# Patient Record
Sex: MALE | NOT HISPANIC OR LATINO | URBAN - METROPOLITAN AREA
[De-identification: names, ages, dates, MRNs, and addresses within clinical notes are randomized per-mention and may not be internally consistent; named-entity substitution may affect disease eponyms.]

---

## 2017-01-31 ENCOUNTER — EMERGENCY (EMERGENCY)
Facility: HOSPITAL | Age: 67
LOS: 1 days | Discharge: HOME | End: 2017-01-31
Attending: EMERGENCY MEDICINE | Admitting: EMERGENCY MEDICINE
Payer: MEDICARE

## 2017-01-31 VITALS
OXYGEN SATURATION: 95 % | SYSTOLIC BLOOD PRESSURE: 132 MMHG | HEART RATE: 99 BPM | DIASTOLIC BLOOD PRESSURE: 82 MMHG | RESPIRATION RATE: 18 BRPM | TEMPERATURE: 98 F

## 2017-01-31 DIAGNOSIS — R35.0 FREQUENCY OF MICTURITION: ICD-10-CM

## 2017-01-31 DIAGNOSIS — R05 COUGH: ICD-10-CM

## 2017-01-31 DIAGNOSIS — R50.9 FEVER, UNSPECIFIED: ICD-10-CM

## 2017-01-31 DIAGNOSIS — R06.2 WHEEZING: ICD-10-CM

## 2017-01-31 PROCEDURE — 99284 EMERGENCY DEPT VISIT MOD MDM: CPT

## 2017-01-31 RX ORDER — SODIUM CHLORIDE 9 MG/ML
1000 INJECTION INTRAMUSCULAR; INTRAVENOUS; SUBCUTANEOUS ONCE
Qty: 0 | Refills: 0 | Status: COMPLETED | OUTPATIENT
Start: 2017-01-31 | End: 2017-01-31

## 2017-01-31 RX ORDER — ALBUTEROL 90 UG/1
2.5 AEROSOL, METERED ORAL
Qty: 0 | Refills: 0 | Status: COMPLETED | OUTPATIENT
Start: 2017-01-31 | End: 2017-01-31

## 2017-01-31 NOTE — ED PROVIDER NOTE - ATTENDING CONTRIBUTION TO CARE
Private Physician Marley Mishra,(PCP), [facility]  66y pmh Prostate ca mets to bone, on alternate chemo,  pw complains of fever tmax 101.5 . pt has complains of fever off/on for two months. Had ct done yesterday neg for PE/PNA , Postivie for sml loculated left pleural effusion, sml rt effusion, enlarged hilar LN .Now comes to ed referred by pmd for complains of fever. Pt completed abx 4d ago w continued fever but with improving cough, no shortness of breath no diarreha + urinary frequency without change from baseline. PE WDWN male mild cough. NCAT chest mild vanessa wheeze no use accessory muscles, No cvat, abd soft neruo no focal defects  Marlon Knapp MD, Facep

## 2017-01-31 NOTE — ED PROVIDER NOTE - PROGRESS NOTE DETAILS
patient refuses cxr. Had outpatient CTA yesterday which was negative for PNA and PE.  Kvng PGY-3 Endorsed to Dr Geetha Knapp MD, Facep Discussed with PMD Marley Mishra. No source of fever identified. Not neutropenic. H/H at baseline as per Tad. Will plan for d/c with close PMD follow-up. PMD will follow with blood and urine cultures.   Kvng PGY-3 aGREE WITH ABOVE RES NOTE, GIVEN STRICT RETURN INSTRUCTIONS- CKT

## 2017-01-31 NOTE — ED PROVIDER NOTE - MEDICAL DECISION MAKING DETAILS
66yM h/o prostate CA with bony mets presents with intermittent fever x 2 months, no resolution with recent outpatient abx x 2. Associated cough. Negative CTA chest yesterday. Well appearing. Wheezing on lung exam. Will obtain bloodwork, rule out neutropenia, urinalysis, cxr, blood and urine cultures and reevaluate. Kvng PGY-3

## 2017-01-31 NOTE — ED PROVIDER NOTE - OBJECTIVE STATEMENT
66yM with prostate ca mets to bone, on experimental PO chemo, presents with fever, tmax 101.5. Fever intermittently x 2 months, recently completed 2nd course of antibiotics 4 days ago without resolution of fever. Also with cough. Had outpatient CTA chest yesterday, negative for PE and PNA. Denies recent neutropenia. Denies abdominal pain, SOB, diarrhea. + urinary frequency without change from baseline.   Marley Mishra,(PCP), [facility]

## 2017-02-01 VITALS
OXYGEN SATURATION: 95 % | DIASTOLIC BLOOD PRESSURE: 77 MMHG | SYSTOLIC BLOOD PRESSURE: 117 MMHG | RESPIRATION RATE: 15 BRPM | HEART RATE: 88 BPM | TEMPERATURE: 98 F

## 2017-02-01 LAB
ALBUMIN SERPL ELPH-MCNC: 4.2 G/DL — SIGNIFICANT CHANGE UP (ref 3.3–5)
ALP SERPL-CCNC: 191 U/L — HIGH (ref 40–120)
ALT FLD-CCNC: 13 U/L RC — SIGNIFICANT CHANGE UP (ref 10–45)
ANION GAP SERPL CALC-SCNC: 14 MMOL/L — SIGNIFICANT CHANGE UP (ref 5–17)
APPEARANCE UR: CLEAR — SIGNIFICANT CHANGE UP
AST SERPL-CCNC: 52 U/L — HIGH (ref 10–40)
BASOPHILS # BLD AUTO: 0 K/UL — SIGNIFICANT CHANGE UP (ref 0–0.2)
BASOPHILS NFR BLD AUTO: 0.1 % — SIGNIFICANT CHANGE UP (ref 0–2)
BILIRUB SERPL-MCNC: 0.4 MG/DL — SIGNIFICANT CHANGE UP (ref 0.2–1.2)
BILIRUB UR-MCNC: NEGATIVE — SIGNIFICANT CHANGE UP
BUN SERPL-MCNC: 10 MG/DL — SIGNIFICANT CHANGE UP (ref 7–23)
CALCIUM SERPL-MCNC: 8.8 MG/DL — SIGNIFICANT CHANGE UP (ref 8.4–10.5)
CHLORIDE SERPL-SCNC: 96 MMOL/L — SIGNIFICANT CHANGE UP (ref 96–108)
CO2 SERPL-SCNC: 24 MMOL/L — SIGNIFICANT CHANGE UP (ref 22–31)
COLOR SPEC: SIGNIFICANT CHANGE UP
CREAT SERPL-MCNC: 0.52 MG/DL — SIGNIFICANT CHANGE UP (ref 0.5–1.3)
DIFF PNL FLD: NEGATIVE — SIGNIFICANT CHANGE UP
EOSINOPHIL # BLD AUTO: 0 K/UL — SIGNIFICANT CHANGE UP (ref 0–0.5)
EOSINOPHIL NFR BLD AUTO: 0.3 % — SIGNIFICANT CHANGE UP (ref 0–6)
GAS PNL BLDV: SIGNIFICANT CHANGE UP
GLUCOSE SERPL-MCNC: 121 MG/DL — HIGH (ref 70–99)
GLUCOSE UR QL: NEGATIVE — SIGNIFICANT CHANGE UP
HCT VFR BLD CALC: 28.8 % — LOW (ref 39–50)
HGB BLD-MCNC: 9.6 G/DL — LOW (ref 13–17)
KETONES UR-MCNC: NEGATIVE — SIGNIFICANT CHANGE UP
LEUKOCYTE ESTERASE UR-ACNC: NEGATIVE — SIGNIFICANT CHANGE UP
LYMPHOCYTES # BLD AUTO: 1.8 K/UL — SIGNIFICANT CHANGE UP (ref 1–3.3)
LYMPHOCYTES # BLD AUTO: 23.7 % — SIGNIFICANT CHANGE UP (ref 13–44)
MCHC RBC-ENTMCNC: 26.9 PG — LOW (ref 27–34)
MCHC RBC-ENTMCNC: 33.2 GM/DL — SIGNIFICANT CHANGE UP (ref 32–36)
MCV RBC AUTO: 81.2 FL — SIGNIFICANT CHANGE UP (ref 80–100)
MONOCYTES # BLD AUTO: 0.9 K/UL — SIGNIFICANT CHANGE UP (ref 0–0.9)
MONOCYTES NFR BLD AUTO: 11.5 % — SIGNIFICANT CHANGE UP (ref 2–14)
NEUTROPHILS # BLD AUTO: 4.9 K/UL — SIGNIFICANT CHANGE UP (ref 1.8–7.4)
NEUTROPHILS NFR BLD AUTO: 64.5 % — SIGNIFICANT CHANGE UP (ref 43–77)
NITRITE UR-MCNC: NEGATIVE — SIGNIFICANT CHANGE UP
PH UR: 6.5 — SIGNIFICANT CHANGE UP (ref 4.8–8)
PLATELET # BLD AUTO: 302 K/UL — SIGNIFICANT CHANGE UP (ref 150–400)
POTASSIUM SERPL-MCNC: 3.8 MMOL/L — SIGNIFICANT CHANGE UP (ref 3.5–5.3)
POTASSIUM SERPL-SCNC: 3.8 MMOL/L — SIGNIFICANT CHANGE UP (ref 3.5–5.3)
PROT SERPL-MCNC: 8.1 G/DL — SIGNIFICANT CHANGE UP (ref 6–8.3)
PROT UR-MCNC: 100 MG/DL
RAPID RVP RESULT: SIGNIFICANT CHANGE UP
RBC # BLD: 3.55 M/UL — LOW (ref 4.2–5.8)
RBC # FLD: 14.9 % — HIGH (ref 10.3–14.5)
RBC CASTS # UR COMP ASSIST: SIGNIFICANT CHANGE UP /HPF (ref 0–2)
SODIUM SERPL-SCNC: 134 MMOL/L — LOW (ref 135–145)
SP GR SPEC: 1.01 — SIGNIFICANT CHANGE UP (ref 1.01–1.02)
UROBILINOGEN FLD QL: NEGATIVE — SIGNIFICANT CHANGE UP
WBC # BLD: 7.6 K/UL — SIGNIFICANT CHANGE UP (ref 3.8–10.5)
WBC # FLD AUTO: 7.6 K/UL — SIGNIFICANT CHANGE UP (ref 3.8–10.5)
WBC UR QL: SIGNIFICANT CHANGE UP /HPF (ref 0–5)

## 2017-02-01 RX ORDER — ACETAMINOPHEN 500 MG
1000 TABLET ORAL ONCE
Qty: 0 | Refills: 0 | Status: COMPLETED | OUTPATIENT
Start: 2017-02-01 | End: 2017-02-01

## 2017-02-01 RX ADMIN — ALBUTEROL 2.5 MILLIGRAM(S): 90 AEROSOL, METERED ORAL at 01:07

## 2017-02-01 RX ADMIN — Medication 400 MILLIGRAM(S): at 02:30

## 2017-02-01 RX ADMIN — ALBUTEROL 2.5 MILLIGRAM(S): 90 AEROSOL, METERED ORAL at 01:08

## 2017-02-01 RX ADMIN — SODIUM CHLORIDE 1000 MILLILITER(S): 9 INJECTION INTRAMUSCULAR; INTRAVENOUS; SUBCUTANEOUS at 00:07

## 2017-02-01 RX ADMIN — ALBUTEROL 2.5 MILLIGRAM(S): 90 AEROSOL, METERED ORAL at 00:19

## 2017-02-02 LAB
CULTURE RESULTS: NO GROWTH — SIGNIFICANT CHANGE UP
SPECIMEN SOURCE: SIGNIFICANT CHANGE UP

## 2017-02-06 LAB
CULTURE RESULTS: SIGNIFICANT CHANGE UP
CULTURE RESULTS: SIGNIFICANT CHANGE UP
SPECIMEN SOURCE: SIGNIFICANT CHANGE UP
SPECIMEN SOURCE: SIGNIFICANT CHANGE UP

## 2017-04-13 PROCEDURE — 87040 BLOOD CULTURE FOR BACTERIA: CPT

## 2017-04-13 PROCEDURE — 87798 DETECT AGENT NOS DNA AMP: CPT

## 2017-04-13 PROCEDURE — 99284 EMERGENCY DEPT VISIT MOD MDM: CPT | Mod: 25

## 2017-04-13 PROCEDURE — 84295 ASSAY OF SERUM SODIUM: CPT

## 2017-04-13 PROCEDURE — 85027 COMPLETE CBC AUTOMATED: CPT

## 2017-04-13 PROCEDURE — 80053 COMPREHEN METABOLIC PANEL: CPT

## 2017-04-13 PROCEDURE — 83605 ASSAY OF LACTIC ACID: CPT

## 2017-04-13 PROCEDURE — 85014 HEMATOCRIT: CPT

## 2017-04-13 PROCEDURE — 82330 ASSAY OF CALCIUM: CPT

## 2017-04-13 PROCEDURE — 87086 URINE CULTURE/COLONY COUNT: CPT

## 2017-04-13 PROCEDURE — 96374 THER/PROPH/DIAG INJ IV PUSH: CPT

## 2017-04-13 PROCEDURE — 82947 ASSAY GLUCOSE BLOOD QUANT: CPT

## 2017-04-13 PROCEDURE — 84132 ASSAY OF SERUM POTASSIUM: CPT

## 2017-04-13 PROCEDURE — 87633 RESP VIRUS 12-25 TARGETS: CPT

## 2017-04-13 PROCEDURE — 94640 AIRWAY INHALATION TREATMENT: CPT

## 2017-04-13 PROCEDURE — 82435 ASSAY OF BLOOD CHLORIDE: CPT

## 2017-04-13 PROCEDURE — 87581 M.PNEUMON DNA AMP PROBE: CPT

## 2017-04-13 PROCEDURE — 87486 CHLMYD PNEUM DNA AMP PROBE: CPT

## 2017-04-13 PROCEDURE — 81001 URINALYSIS AUTO W/SCOPE: CPT

## 2017-04-13 PROCEDURE — 82803 BLOOD GASES ANY COMBINATION: CPT

## 2017-05-06 ENCOUNTER — EMERGENCY (EMERGENCY)
Facility: HOSPITAL | Age: 67
LOS: 1 days | Discharge: HOME | End: 2017-05-06
Attending: EMERGENCY MEDICINE | Admitting: EMERGENCY MEDICINE
Payer: MEDICARE

## 2017-05-06 VITALS
SYSTOLIC BLOOD PRESSURE: 135 MMHG | TEMPERATURE: 98 F | RESPIRATION RATE: 16 BRPM | HEART RATE: 96 BPM | DIASTOLIC BLOOD PRESSURE: 90 MMHG | OXYGEN SATURATION: 96 %

## 2017-05-06 VITALS
RESPIRATION RATE: 18 BRPM | OXYGEN SATURATION: 96 % | TEMPERATURE: 99 F | HEART RATE: 89 BPM | SYSTOLIC BLOOD PRESSURE: 133 MMHG | DIASTOLIC BLOOD PRESSURE: 92 MMHG

## 2017-05-06 DIAGNOSIS — K92.1 MELENA: ICD-10-CM

## 2017-05-06 DIAGNOSIS — J45.909 UNSPECIFIED ASTHMA, UNCOMPLICATED: ICD-10-CM

## 2017-05-06 DIAGNOSIS — K62.5 HEMORRHAGE OF ANUS AND RECTUM: ICD-10-CM

## 2017-05-06 DIAGNOSIS — Z79.899 OTHER LONG TERM (CURRENT) DRUG THERAPY: ICD-10-CM

## 2017-05-06 DIAGNOSIS — K92.2 GASTROINTESTINAL HEMORRHAGE, UNSPECIFIED: ICD-10-CM

## 2017-05-06 LAB
ALBUMIN SERPL ELPH-MCNC: 4.4 G/DL — SIGNIFICANT CHANGE UP (ref 3.3–5)
ALP SERPL-CCNC: 76 U/L — SIGNIFICANT CHANGE UP (ref 40–120)
ALT FLD-CCNC: 29 U/L RC — SIGNIFICANT CHANGE UP (ref 10–45)
ANION GAP SERPL CALC-SCNC: 14 MMOL/L — SIGNIFICANT CHANGE UP (ref 5–17)
APTT BLD: 28.2 SEC — SIGNIFICANT CHANGE UP (ref 27.5–37.4)
AST SERPL-CCNC: 34 U/L — SIGNIFICANT CHANGE UP (ref 10–40)
BASE EXCESS BLDV CALC-SCNC: 1 MMOL/L — SIGNIFICANT CHANGE UP (ref -2–2)
BASOPHILS # BLD AUTO: 0 K/UL — SIGNIFICANT CHANGE UP (ref 0–0.2)
BASOPHILS NFR BLD AUTO: 0.1 % — SIGNIFICANT CHANGE UP (ref 0–2)
BILIRUB SERPL-MCNC: 0.3 MG/DL — SIGNIFICANT CHANGE UP (ref 0.2–1.2)
BLD GP AB SCN SERPL QL: NEGATIVE — SIGNIFICANT CHANGE UP
BUN SERPL-MCNC: 21 MG/DL — SIGNIFICANT CHANGE UP (ref 7–23)
CA-I SERPL-SCNC: 1.21 MMOL/L — SIGNIFICANT CHANGE UP (ref 1.12–1.3)
CALCIUM SERPL-MCNC: 8.9 MG/DL — SIGNIFICANT CHANGE UP (ref 8.4–10.5)
CHLORIDE BLDV-SCNC: 106 MMOL/L — SIGNIFICANT CHANGE UP (ref 96–108)
CHLORIDE SERPL-SCNC: 102 MMOL/L — SIGNIFICANT CHANGE UP (ref 96–108)
CO2 BLDV-SCNC: 28 MMOL/L — SIGNIFICANT CHANGE UP (ref 22–30)
CO2 SERPL-SCNC: 24 MMOL/L — SIGNIFICANT CHANGE UP (ref 22–31)
CREAT SERPL-MCNC: 0.61 MG/DL — SIGNIFICANT CHANGE UP (ref 0.5–1.3)
EOSINOPHIL # BLD AUTO: 0 K/UL — SIGNIFICANT CHANGE UP (ref 0–0.5)
EOSINOPHIL NFR BLD AUTO: 0.3 % — SIGNIFICANT CHANGE UP (ref 0–6)
GAS PNL BLDV: 138 MMOL/L — SIGNIFICANT CHANGE UP (ref 136–145)
GAS PNL BLDV: SIGNIFICANT CHANGE UP
GAS PNL BLDV: SIGNIFICANT CHANGE UP
GLUCOSE BLDV-MCNC: 92 MG/DL — SIGNIFICANT CHANGE UP (ref 70–99)
GLUCOSE SERPL-MCNC: 94 MG/DL — SIGNIFICANT CHANGE UP (ref 70–99)
HCO3 BLDV-SCNC: 26 MMOL/L — SIGNIFICANT CHANGE UP (ref 21–29)
HCT VFR BLD CALC: 28 % — LOW (ref 39–50)
HCT VFR BLDA CALC: 30 % — LOW (ref 39–50)
HGB BLD CALC-MCNC: 9.6 G/DL — LOW (ref 13–17)
HGB BLD-MCNC: 9.8 G/DL — LOW (ref 13–17)
INR BLD: 0.99 RATIO — SIGNIFICANT CHANGE UP (ref 0.88–1.16)
LACTATE BLDV-MCNC: 1.7 MMOL/L — SIGNIFICANT CHANGE UP (ref 0.7–2)
LYMPHOCYTES # BLD AUTO: 1.5 K/UL — SIGNIFICANT CHANGE UP (ref 1–3.3)
LYMPHOCYTES # BLD AUTO: 16.5 % — SIGNIFICANT CHANGE UP (ref 13–44)
MCHC RBC-ENTMCNC: 31.7 PG — SIGNIFICANT CHANGE UP (ref 27–34)
MCHC RBC-ENTMCNC: 34.9 GM/DL — SIGNIFICANT CHANGE UP (ref 32–36)
MCV RBC AUTO: 90.8 FL — SIGNIFICANT CHANGE UP (ref 80–100)
MONOCYTES # BLD AUTO: 0.4 K/UL — SIGNIFICANT CHANGE UP (ref 0–0.9)
MONOCYTES NFR BLD AUTO: 4.3 % — SIGNIFICANT CHANGE UP (ref 2–14)
NEUTROPHILS # BLD AUTO: 7 K/UL — SIGNIFICANT CHANGE UP (ref 1.8–7.4)
NEUTROPHILS NFR BLD AUTO: 78.8 % — HIGH (ref 43–77)
OTHER CELLS CSF MANUAL: 5 ML/DL — LOW (ref 18–22)
PCO2 BLDV: 48 MMHG — SIGNIFICANT CHANGE UP (ref 35–50)
PH BLDV: 7.36 — SIGNIFICANT CHANGE UP (ref 7.35–7.45)
PLATELET # BLD AUTO: 77 K/UL — LOW (ref 150–400)
PO2 BLDV: 27 MMHG — SIGNIFICANT CHANGE UP (ref 25–45)
POTASSIUM BLDV-SCNC: 4 MMOL/L — SIGNIFICANT CHANGE UP (ref 3.5–5)
POTASSIUM SERPL-MCNC: 4.3 MMOL/L — SIGNIFICANT CHANGE UP (ref 3.5–5.3)
POTASSIUM SERPL-SCNC: 4.3 MMOL/L — SIGNIFICANT CHANGE UP (ref 3.5–5.3)
PROT SERPL-MCNC: 7.6 G/DL — SIGNIFICANT CHANGE UP (ref 6–8.3)
PROTHROM AB SERPL-ACNC: 10.8 SEC — SIGNIFICANT CHANGE UP (ref 9.8–12.7)
RBC # BLD: 3.09 M/UL — LOW (ref 4.2–5.8)
RBC # FLD: 19.5 % — HIGH (ref 10.3–14.5)
RH IG SCN BLD-IMP: POSITIVE — SIGNIFICANT CHANGE UP
SAO2 % BLDV: 39 % — LOW (ref 67–88)
SODIUM SERPL-SCNC: 140 MMOL/L — SIGNIFICANT CHANGE UP (ref 135–145)
WBC # BLD: 8.9 K/UL — SIGNIFICANT CHANGE UP (ref 3.8–10.5)
WBC # FLD AUTO: 8.9 K/UL — SIGNIFICANT CHANGE UP (ref 3.8–10.5)

## 2017-05-06 PROCEDURE — 84295 ASSAY OF SERUM SODIUM: CPT

## 2017-05-06 PROCEDURE — 84132 ASSAY OF SERUM POTASSIUM: CPT

## 2017-05-06 PROCEDURE — 82947 ASSAY GLUCOSE BLOOD QUANT: CPT

## 2017-05-06 PROCEDURE — 99284 EMERGENCY DEPT VISIT MOD MDM: CPT | Mod: GC

## 2017-05-06 PROCEDURE — 85730 THROMBOPLASTIN TIME PARTIAL: CPT

## 2017-05-06 PROCEDURE — 85014 HEMATOCRIT: CPT

## 2017-05-06 PROCEDURE — 86850 RBC ANTIBODY SCREEN: CPT

## 2017-05-06 PROCEDURE — 99283 EMERGENCY DEPT VISIT LOW MDM: CPT | Mod: 25

## 2017-05-06 PROCEDURE — 83605 ASSAY OF LACTIC ACID: CPT

## 2017-05-06 PROCEDURE — 86901 BLOOD TYPING SEROLOGIC RH(D): CPT

## 2017-05-06 PROCEDURE — 85027 COMPLETE CBC AUTOMATED: CPT

## 2017-05-06 PROCEDURE — 86900 BLOOD TYPING SEROLOGIC ABO: CPT

## 2017-05-06 PROCEDURE — 82330 ASSAY OF CALCIUM: CPT

## 2017-05-06 PROCEDURE — 85610 PROTHROMBIN TIME: CPT

## 2017-05-06 PROCEDURE — 82435 ASSAY OF BLOOD CHLORIDE: CPT

## 2017-05-06 PROCEDURE — 82272 OCCULT BLD FECES 1-3 TESTS: CPT

## 2017-05-06 PROCEDURE — 82803 BLOOD GASES ANY COMBINATION: CPT

## 2017-05-06 PROCEDURE — 80053 COMPREHEN METABOLIC PANEL: CPT

## 2017-05-06 NOTE — ED ADULT NURSE NOTE - OBJECTIVE STATEMENT
67 y/o M presents to the ED c/o rectal bleed.  Patient has a hx of prostate CA that is stage 4 with metastasis to bone, anemia.  Patient states he was diagnosed with prostate CA in 2012.  Patient states he was treated with radiation in 2013.  The patient is currently on chemo and states he started that in February, 2017.  Patient last dose of chemo was April 19th.  Patient comes to the ED stating he has been having rectal bleeding x1 month.  Patient states the blood comes in clots and then becomes liquid.  Patient it happens almost every time he goes to the bathroom.  Patient denies any dizziness or lightheadedness.  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 Face is symmetrical. PERRL 3mmB. Speech is clear.  No chest pain, shortness of breath, diaphoresis, palpitations, left arm pain, excessive fatigue.  Wife at bedside.  Patient safety and comfort measures provided.

## 2017-05-06 NOTE — ED PROVIDER NOTE - PHYSICAL EXAMINATION
Gen: NAD  Eyes:  sclerae white, no icterus  ENT: Moist mucous membranes. No exudates  Neck: supple, no LAD, mass or goiter, trachea midline  CV: RRR. Audible S1 and S2. No murmurs, rubs, gallops, S3, nor S4  Pulm: Clear to auscultation bilaterally. No wheezes, rales, or rhonchi  Abd: BS+, nondistended, No tenderness to palpation  Rectal: No rectal lesions or fissures, small amount of gross red blood on ARNIE  Musculoskeletal:  No edema  Skin: no lesions or scars noted  Psych: mood good, affect full range and congruent with mood.  Neurologic: AAOx3

## 2017-05-06 NOTE — ED PROVIDER NOTE - OBJECTIVE STATEMENT
68 y/o M w/ Hx of prostate CA (stage IV w/ mets to bones) diagnosed in 2012 s/p radiation in 2013, now on chemo since Feb 22, 2016, last dose on April 19, presents w/ rectal bleeding. Has had rectal bleeding for past month, saw GI on 5/1, diagnosed w/ radiation proctitis, anoscopy showed AVMs, no hemorrhoids, given mesalamine suppository and fleet enema. Last night had blood with clots and again this morning x 2. Also had some blood with stool. No dizziness, light-headedness, fever, or chills.  No CP or SOB.   primary care physician: Marley Mishra  GI: Conrad Carter  Onc: Outside doctor, in [place]

## 2017-05-06 NOTE — ED PROVIDER NOTE - PROGRESS NOTE DETAILS
pt is due to go on airplane to [place] tomorrow and wanted to get checked before he left; no cp/sob/lightheadedness/abdominal pain; offered admission to patient to stay for monitoring even if hgb is at baseline; pt does not want to stay; explained risks of leaving including massive gi bleed on airplane, pt states he understands risks and wants to leave; Patient wants to leave AMA; explained risks of leaving including death, life-threatening bleed on airplane; pt states he understands risks and wants to leave; well-appearing at the moment

## 2017-05-06 NOTE — ED ADULT NURSE NOTE - ED STAT RN HANDOFF DETAILS
Pt and family state they understand the risk of leaving and dangers of travel with GI bleed and state they still must leave.  Pt given lab results and d/c info.  AMA in pt chart.

## 2017-05-06 NOTE — ED PROVIDER NOTE - ATTENDING CONTRIBUTION TO CARE
Att yo male PMH prostate ca last chemo  receives chemo in [place]; presents with brbpr x 2 episodes of 1 tablespoon each at 9 pm yesterday and 9 am yesterday; pt has had rectal bleeding x 1 month and was diagnosed with radiation proctitis; avm on anoscopy; pt is due to go on airplane to [place] tomorrow and wanted to get checked before he left; no cp/sob/lightheadedness/abdominal pain; on exam nontender abdomen; + guiac + blood; Plan: labs, cbc, type, reassess

## 2017-07-31 ENCOUNTER — INPATIENT (INPATIENT)
Facility: HOSPITAL | Age: 67
LOS: 3 days | Discharge: HOME | DRG: 722 | End: 2017-08-04
Attending: INTERNAL MEDICINE | Admitting: HOSPITALIST
Payer: MEDICARE

## 2017-07-31 VITALS
OXYGEN SATURATION: 92 % | SYSTOLIC BLOOD PRESSURE: 135 MMHG | HEART RATE: 111 BPM | TEMPERATURE: 98 F | DIASTOLIC BLOOD PRESSURE: 86 MMHG | RESPIRATION RATE: 20 BRPM

## 2017-07-31 DIAGNOSIS — R06.02 SHORTNESS OF BREATH: ICD-10-CM

## 2017-07-31 DIAGNOSIS — J90 PLEURAL EFFUSION, NOT ELSEWHERE CLASSIFIED: ICD-10-CM

## 2017-07-31 DIAGNOSIS — C61 MALIGNANT NEOPLASM OF PROSTATE: ICD-10-CM

## 2017-07-31 DIAGNOSIS — Z29.9 ENCOUNTER FOR PROPHYLACTIC MEASURES, UNSPECIFIED: ICD-10-CM

## 2017-07-31 DIAGNOSIS — D64.9 ANEMIA, UNSPECIFIED: ICD-10-CM

## 2017-07-31 DIAGNOSIS — R53.1 WEAKNESS: ICD-10-CM

## 2017-07-31 LAB
ALBUMIN SERPL ELPH-MCNC: 4.1 G/DL — SIGNIFICANT CHANGE UP (ref 3.3–5)
ALP SERPL-CCNC: 64 U/L — SIGNIFICANT CHANGE UP (ref 40–120)
ALT FLD-CCNC: 17 U/L RC — SIGNIFICANT CHANGE UP (ref 10–45)
ANION GAP SERPL CALC-SCNC: 14 MMOL/L — SIGNIFICANT CHANGE UP (ref 5–17)
APPEARANCE UR: CLEAR — SIGNIFICANT CHANGE UP
APTT BLD: 28.2 SEC — SIGNIFICANT CHANGE UP (ref 27.5–37.4)
AST SERPL-CCNC: 29 U/L — SIGNIFICANT CHANGE UP (ref 10–40)
BASOPHILS # BLD AUTO: 0 K/UL — SIGNIFICANT CHANGE UP (ref 0–0.2)
BASOPHILS NFR BLD AUTO: 0.3 % — SIGNIFICANT CHANGE UP (ref 0–2)
BILIRUB SERPL-MCNC: 0.5 MG/DL — SIGNIFICANT CHANGE UP (ref 0.2–1.2)
BILIRUB UR-MCNC: NEGATIVE — SIGNIFICANT CHANGE UP
BUN SERPL-MCNC: 15 MG/DL — SIGNIFICANT CHANGE UP (ref 7–23)
CALCIUM SERPL-MCNC: 9.4 MG/DL — SIGNIFICANT CHANGE UP (ref 8.4–10.5)
CHLORIDE SERPL-SCNC: 98 MMOL/L — SIGNIFICANT CHANGE UP (ref 96–108)
CK SERPL-CCNC: 92 U/L — SIGNIFICANT CHANGE UP (ref 30–200)
CO2 SERPL-SCNC: 24 MMOL/L — SIGNIFICANT CHANGE UP (ref 22–31)
COLOR SPEC: SIGNIFICANT CHANGE UP
CREAT SERPL-MCNC: 0.65 MG/DL — SIGNIFICANT CHANGE UP (ref 0.5–1.3)
DIFF PNL FLD: NEGATIVE — SIGNIFICANT CHANGE UP
EOSINOPHIL # BLD AUTO: 0 K/UL — SIGNIFICANT CHANGE UP (ref 0–0.5)
EOSINOPHIL NFR BLD AUTO: 0 % — SIGNIFICANT CHANGE UP (ref 0–6)
GLUCOSE SERPL-MCNC: 129 MG/DL — HIGH (ref 70–99)
GLUCOSE UR QL: NEGATIVE — SIGNIFICANT CHANGE UP
HCT VFR BLD CALC: 31.9 % — LOW (ref 39–50)
HGB BLD-MCNC: 10.8 G/DL — LOW (ref 13–17)
INR BLD: 1.06 RATIO — SIGNIFICANT CHANGE UP (ref 0.88–1.16)
KETONES UR-MCNC: NEGATIVE — SIGNIFICANT CHANGE UP
LEUKOCYTE ESTERASE UR-ACNC: NEGATIVE — SIGNIFICANT CHANGE UP
LYMPHOCYTES # BLD AUTO: 1.6 K/UL — SIGNIFICANT CHANGE UP (ref 1–3.3)
LYMPHOCYTES # BLD AUTO: 26.8 % — SIGNIFICANT CHANGE UP (ref 13–44)
MCHC RBC-ENTMCNC: 32.3 PG — SIGNIFICANT CHANGE UP (ref 27–34)
MCHC RBC-ENTMCNC: 33.9 GM/DL — SIGNIFICANT CHANGE UP (ref 32–36)
MCV RBC AUTO: 95.2 FL — SIGNIFICANT CHANGE UP (ref 80–100)
MONOCYTES # BLD AUTO: 0.4 K/UL — SIGNIFICANT CHANGE UP (ref 0–0.9)
MONOCYTES NFR BLD AUTO: 6.5 % — SIGNIFICANT CHANGE UP (ref 2–14)
NEUTROPHILS # BLD AUTO: 3.9 K/UL — SIGNIFICANT CHANGE UP (ref 1.8–7.4)
NEUTROPHILS NFR BLD AUTO: 66.5 % — SIGNIFICANT CHANGE UP (ref 43–77)
NITRITE UR-MCNC: NEGATIVE — SIGNIFICANT CHANGE UP
PH UR: 6.5 — SIGNIFICANT CHANGE UP (ref 5–8)
PLATELET # BLD AUTO: 105 K/UL — LOW (ref 150–400)
POTASSIUM SERPL-MCNC: 4 MMOL/L — SIGNIFICANT CHANGE UP (ref 3.5–5.3)
POTASSIUM SERPL-SCNC: 4 MMOL/L — SIGNIFICANT CHANGE UP (ref 3.5–5.3)
PROT SERPL-MCNC: 7.6 G/DL — SIGNIFICANT CHANGE UP (ref 6–8.3)
PROT UR-MCNC: 30 MG/DL
PROTHROM AB SERPL-ACNC: 11.5 SEC — SIGNIFICANT CHANGE UP (ref 9.8–12.7)
RBC # BLD: 3.35 M/UL — LOW (ref 4.2–5.8)
RBC # FLD: 16.1 % — HIGH (ref 10.3–14.5)
SODIUM SERPL-SCNC: 136 MMOL/L — SIGNIFICANT CHANGE UP (ref 135–145)
SP GR SPEC: >1.03 — HIGH (ref 1.01–1.02)
TROPONIN T SERPL-MCNC: <0.01 NG/ML — SIGNIFICANT CHANGE UP (ref 0–0.06)
UROBILINOGEN FLD QL: NEGATIVE — SIGNIFICANT CHANGE UP
WBC # BLD: 5.8 K/UL — SIGNIFICANT CHANGE UP (ref 3.8–10.5)
WBC # FLD AUTO: 5.8 K/UL — SIGNIFICANT CHANGE UP (ref 3.8–10.5)

## 2017-07-31 PROCEDURE — 99497 ADVNCD CARE PLAN 30 MIN: CPT | Mod: 25

## 2017-07-31 PROCEDURE — 93010 ELECTROCARDIOGRAM REPORT: CPT

## 2017-07-31 PROCEDURE — 71275 CT ANGIOGRAPHY CHEST: CPT | Mod: 26

## 2017-07-31 PROCEDURE — 71020: CPT | Mod: 26

## 2017-07-31 PROCEDURE — 99223 1ST HOSP IP/OBS HIGH 75: CPT | Mod: AI,GC

## 2017-07-31 PROCEDURE — 99285 EMERGENCY DEPT VISIT HI MDM: CPT | Mod: 25,GC

## 2017-07-31 RX ORDER — SODIUM CHLORIDE 9 MG/ML
500 INJECTION INTRAMUSCULAR; INTRAVENOUS; SUBCUTANEOUS ONCE
Qty: 0 | Refills: 0 | Status: COMPLETED | OUTPATIENT
Start: 2017-07-31 | End: 2017-07-31

## 2017-07-31 RX ORDER — ENOXAPARIN SODIUM 100 MG/ML
40 INJECTION SUBCUTANEOUS EVERY 24 HOURS
Qty: 0 | Refills: 0 | Status: DISCONTINUED | OUTPATIENT
Start: 2017-07-31 | End: 2017-08-04

## 2017-07-31 RX ORDER — BICALUTAMIDE 50 MG/1
150 TABLET, FILM COATED ORAL DAILY
Qty: 0 | Refills: 0 | Status: DISCONTINUED | OUTPATIENT
Start: 2017-07-31 | End: 2017-08-04

## 2017-07-31 RX ORDER — FERROUS SULFATE 325(65) MG
325 TABLET ORAL DAILY
Qty: 0 | Refills: 0 | Status: DISCONTINUED | OUTPATIENT
Start: 2017-07-31 | End: 2017-08-04

## 2017-07-31 RX ORDER — CHOLECALCIFEROL (VITAMIN D3) 125 MCG
2000 CAPSULE ORAL DAILY
Qty: 0 | Refills: 0 | Status: DISCONTINUED | OUTPATIENT
Start: 2017-07-31 | End: 2017-08-04

## 2017-07-31 RX ORDER — MULTIVIT-MIN/FERROUS GLUCONATE 9 MG/15 ML
1 LIQUID (ML) ORAL DAILY
Qty: 0 | Refills: 0 | Status: DISCONTINUED | OUTPATIENT
Start: 2017-07-31 | End: 2017-08-04

## 2017-07-31 RX ORDER — SODIUM CHLORIDE 9 MG/ML
1000 INJECTION INTRAMUSCULAR; INTRAVENOUS; SUBCUTANEOUS
Qty: 0 | Refills: 0 | Status: COMPLETED | OUTPATIENT
Start: 2017-07-31 | End: 2017-08-01

## 2017-07-31 RX ORDER — DRONABINOL 2.5 MG
2.5 CAPSULE ORAL
Qty: 0 | Refills: 0 | Status: DISCONTINUED | OUTPATIENT
Start: 2017-07-31 | End: 2017-08-04

## 2017-07-31 RX ORDER — DASATINIB 80 MG/1
50 TABLET ORAL DAILY
Qty: 0 | Refills: 0 | Status: DISCONTINUED | OUTPATIENT
Start: 2017-07-31 | End: 2017-08-01

## 2017-07-31 RX ORDER — VITAMIN E 100 UNIT
200 CAPSULE ORAL DAILY
Qty: 0 | Refills: 0 | Status: DISCONTINUED | OUTPATIENT
Start: 2017-07-31 | End: 2017-08-04

## 2017-07-31 RX ORDER — ALBUMIN HUMAN 25 %
250 VIAL (ML) INTRAVENOUS ONCE
Qty: 0 | Refills: 0 | Status: DISCONTINUED | OUTPATIENT
Start: 2017-07-31 | End: 2017-07-31

## 2017-07-31 RX ADMIN — SODIUM CHLORIDE 500 MILLILITER(S): 9 INJECTION INTRAMUSCULAR; INTRAVENOUS; SUBCUTANEOUS at 22:28

## 2017-07-31 NOTE — H&P ADULT - ASSESSMENT
66 yo M with a PMH stage IV prostate cancer with metastasis to the bones, presenting with SOB, found to have bilateral pleural effusions.

## 2017-07-31 NOTE — H&P ADULT - NSHPLABSRESULTS_GEN_ALL_CORE
Labs personally reviewed and interpreted. Notable for Hb 10.8, stable. Plts 105, stable. BUN/creatinine 15/0.65. Cardiac enzymes negative x1 and . UA SG > 1.030    CXR personally reviewed and interpreted. Notable for bilateral pleural effusions, R > L  CT personally reviewed and interpreted. Notable for large bilateral pleural effusions, R > L. 2 cm splenic hypodensity, and a large 8.3 cm L renal complex cyst. Diffuse osseous sclerosis consistent with metastasis.    EKG personally reviewed. Sinus tachycardia, normal axis. Rate 117, , QTc 440. Q waves in leads I, aVL, and V4-6 (lateral leads).

## 2017-07-31 NOTE — ED ADULT NURSE NOTE - CHIEF COMPLAINT QUOTE
SOB, worsening with exertion. pt is currently on chemo, last treatment on wednesday. pt has prostate ca. pt states +coughing, worse at night

## 2017-07-31 NOTE — H&P ADULT - PROBLEM SELECTOR PLAN 4
- In setting of malignancy and chemotherapy, 2/2 anemia of chronic disease  - Will c/w iron tablets and obtain iron studies

## 2017-07-31 NOTE — ED ADULT TRIAGE NOTE - CHIEF COMPLAINT QUOTE
SOB, worsening with exertion. pt is currently on chemo, last treatment on wednesday. pt has prostate ca

## 2017-07-31 NOTE — H&P ADULT - ATTENDING COMMENTS
Attending addendum:  Patient seen and examined, I agree with the above assessment and plan with any changes indicated below.    68 yo M with a PMH stage IV prostate cancer 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 Has a history of prostate cancer, currently on Attending addendum:  Patient seen and examined, I agree with the above assessment and plan with any changes indicated below.    66 yo M with a PMH stage IV prostate cancer 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 Has a history of prostate cancer, currently on Docetaxel, and Carboplatin with last treatment last week. Patient is here with wife and he defers questioning to her. Reports that he has had intermittent issues with shortness of breath and had more severe symptoms last Jan. During that month, had a normal CT scan.    Since then, persistent issues with breathing worse over the past week. Also with non-productive cough that keeps him up at night. Contacted his Oncologist who recommend that he come to the ED.    In the ED, T 97.9, HR 11, /56, O2 92% on RA -> 94% on 2L.     I personally interpreted this patient's labs. They were notable for no leukocytosis, mild anemia, thrombocytopenia. BMP unremarkable with Cr 0.65. Troponin, BMP, and LFTs negative.   I personally interpreted this patient's imaging. CTPA performed and did not show PE, but did show moderate, bilateral effusions. 4cm thoracic AAA, renal cyst.  I personally interpreted this patient's ECG. It showed no evidence of ischemia.     Long discussion with patient and family present. Unclear overall goals of care. Would like to be full code, but do not know if they would want a thoracentesis. However, in discussion with wife, they would still like to pursue aggressive therapy for prostate cancer despite rising PSA on current treatment including traveling to other states for second opinions. Wife states that they would do whatever necessary to help "keep him alive."     #Shortness of breath/pleural effusions:  - Would recommend thoracentesis for diagnosis and symptomatic relief, but wife may want to wait before any procedures  - Recommend continued goals of care discussion  - Consider pulm/ir consult for thoracentesis  - NC as needed for oxygenation  - LDH, total protein, coags  - No evidence of infection, no indication for antibiotics at this time     #AAA  - 4cm thoracic AAA on CT scan, needs follow up as per patient's goals    #Anemia:  - iron studies, no need for transfusion    Remainder of plan as per resident admission note. Attending addendum:  Patient seen and examined, I agree with the above assessment and plan with any changes indicated below.    66 yo M with a PMH stage IV prostate cancer 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 Has a history of prostate cancer, currently on Docetaxel, and Carboplatin with last treatment last week. Patient is here with wife and he defers questioning to her. Reports that he has had intermittent issues with shortness of breath and had more severe symptoms last Jan. During that month, had a normal CT scan.    Since then, persistent issues with breathing worse over the past week. Also with non-productive cough that keeps him up at night. Contacted his Oncologist who recommend that he come to the ED.    In the ED, T 97.9, HR 11, /56, O2 92% on RA -> 94% on 2L.     I personally interpreted this patient's labs. They were notable for no leukocytosis, mild anemia, thrombocytopenia. BMP unremarkable with Cr 0.65. Troponin, BMP, and LFTs negative.   I personally interpreted this patient's imaging. CTPA performed and did not show PE, but did show moderate, bilateral effusions. 4cm thoracic AAA, renal cyst.  I personally interpreted this patient's ECG. It showed no evidence of ischemia.     Long discussion with patient and family present. Unclear overall goals of care. Would like to be full code, but do not know if they would want a thoracentesis. However, in discussion with wife, they would still like to pursue aggressive therapy for prostate cancer despite rising PSA on current treatment including traveling to other states for second opinions. Wife states that they would do whatever necessary to help "keep him alive."     #Shortness of breath/pleural effusions:  - Would recommend thoracentesis for diagnosis and symptomatic relief, but wife may want to wait before any procedures  - Recommend continued goals of care discussion  - Consider pulm/ir consult for thoracentesis  - NC as needed for oxygenation  - LDH, total protein, coags  - No evidence of infection, no indication for antibiotics at this time     #AAA  - 4cm thoracic AAA on CT scan, needs follow up as per patient's goals    #Anemia:  - iron studies, no need for transfusion    Remainder of plan as per resident admission note. I spent > 16 minutes in advanced care planning discussion with patient and wife as detailed above.

## 2017-07-31 NOTE — H&P ADULT - FAMILY HISTORY
Mother  Still living? Unknown  Family history of liver cancer, Age at diagnosis: Age Unknown     Sibling  Still living? Unknown  Family history of breast cancer, Age at diagnosis: Age Unknown

## 2017-07-31 NOTE — H&P ADULT - NSHPREVIEWOFSYSTEMS_GEN_ALL_CORE
Gen: + Weight loss, fatigue. Negative for fevers, chills, anorexia, weight gain.  Eyes: no blurred vision or lacrimation  ENT: no tinnitus, vertigo, or difficulty hearing  Resp: + dyspnea, cough. No wheezing, pleuritic chest pain, hemoptysis, or orthopnea  CV: + Dyspnea on exertion. No chest pain or palpitations  GI: no nausea, vomiting, abdominal pain, diarrhea, constipation, melena, or hematochezia  : no dysuria, hematuria, discharge, or incontinence  MSK: no arthralgias, joint stiffness, joint swelling, or myalgias  Neuro: no focal deficits, confusion, weakness, dizziness, tremors, or seizures  Skin: no rash, lesions, or edema

## 2017-07-31 NOTE — ED ADULT NURSE NOTE - OBJECTIVE STATEMENT
67 y.o male pmh anemia and prostate cancer currently on chemo with last chemo 5 days ago c/o worsening sob and cough that began a few weeks ago. pt states that since starting chemo back in january 2017 he has always had a chronic dry cough with mild sob upon exertion. pt states that his cough has become worse resulting in him to become more sob than norm. pt states any exertion he begins coughing and then becomes sob, has been having a difficult time sleeping, states cough and sob is worse at night. denies any sputum or mucus associated with cough. denies any recent fever, chills, weakness, body aches, sick contacts, or cp. pulse ox upon assessment 94 %, denies feeling sob, no respiratory distress present or increased work of breathing noted. RR of 18. denies the need for oxygen via nasal cannula when offered. verbalizes that his baseline pulse ox is usually around 96%. pt HR tachy to low 100s, afebrile. bp stable. wife remains at bedside. labs obtained. safety and fall precautions maintained.

## 2017-07-31 NOTE — H&P ADULT - PROBLEM SELECTOR PLAN 2
- Likely in setting of malignancy and chemotherapy with SOB  - Despite , would obtain TTE in setting of chemotherapy, as patient may not have heart failure but may have cardiomyopathy 2/2 chemo (Docetaxel) - Likely in setting of malignancy and chemotherapy with SOB  - Despite , would obtain TTE in setting of chemotherapy, as patient may not have heart failure but may have cardiomyopathy 2/2 chemo (Docetaxel)  - Will give IVF in setting of BUN/creatinine ~22  - PT

## 2017-07-31 NOTE — ED PROVIDER NOTE - INTERPRETATION
sinus tachycardia, normal axis, normal intervals, no obvious right heart strain, no obvious ischemia with diffuse ns TWF and no priro EKGs

## 2017-07-31 NOTE — ED ADULT NURSE REASSESSMENT NOTE - NS ED NURSE REASSESS COMMENT FT1
pt resting comfortably. pending CTA. Vs stable. wife remains at bedside. safety and fall precautions maintained. call bell within reach.

## 2017-07-31 NOTE — H&P ADULT - NSHPSOCIALHISTORY_GEN_ALL_CORE
No smoking history. 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 Prescribed marijuana occasionally by oncologist for treatment but gave him headaches. Lives at home with wife. Able to walk without walker/cane.

## 2017-07-31 NOTE — H&P ADULT - PROBLEM SELECTOR PLAN 1
- Cause of dyspnea on exertion.  - Unlikely 2/2 heart failure, as pt has a low BNP. PNA unlikely as symptoms have been chronic without consolidative findings and pt has no leukocytosis and is afebrile.  PE is ruled out. Malignancy is highest on ddx. Would recommend thoracentesis to better evaluate.

## 2017-07-31 NOTE — ED PROVIDER NOTE - CARDIAC, MLM
Normal rate, regular rhythm.  Heart sounds S1, S2.  No murmurs, rubs or gallops. tachycardic, regular rhythm.  Heart sounds S1, S2.  No murmurs, rubs or gallops.

## 2017-07-31 NOTE — H&P ADULT - HISTORY OF PRESENT ILLNESS
66 yo M with a [facility] stage IV prostate cancer 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 He states the SOB has been gradually becoming worse over the last month and more acutely worsened over the past 2 days. The SOB occurs when he is walking up 1-2 flights of stairs. If he walks slowly on flat ground, he does not have SOB. Also endorses cough, which occurs at rest as well and is more pronounced when he lays down on either side. Denies symptoms lying down flat, and alternated between lying flat and using one pillow. Denies chest pain or palpitations. Traveled to [place] 1 month ago. No sick contacts. No known history of heart failure. Denies fevers, chills, N/V, diarrhea, abdominal pain, or blurry vision. Did have some headaches after using marijuana for treatment    Of note, his prostate cancer was diagnosed in , completed RT in , and started chemo 2017. His cycles last 4 weeks (3 weeks of chemo and 1 week of rest - 1st week of chemo is Avastin, Docetaxel, and Carboplatin and the 2nd/3rd weeks are Docetaxel/Carboplatin only). He is currently on his 8th cycle of chemo; last dose  (did not receive Carboplatin then due to SOB). Of note, his wife will usually give him Neupogen 5 mg after chemo as per oncologist (in [place] recommendations She also gave him a dose last night due to weakness. He endorses 18 lb weight loss over the past 7 months. Of note, he is BRAF positive, and has a mother who  at a young age of liver cancer and a sister who has breast cancer improving with treatment.

## 2017-07-31 NOTE — ED PROVIDER NOTE - ATTENDING CONTRIBUTION TO CARE
ATTENDING MD: I, Hugo Vera, have personally seen and examined this patient.  I have discussed all aspects of care with the fellow. Fellow note reviewed and agree on plan of care and except where noted.  See HPI, PE, and MDM for details.     Exam: Cachectic and frail appearing male who appears older than stated age. Nontoxic, alert and oriented. Eyes clear. mucus membranes are dry, neck supple, oropharynx is within normal limits, no thrush. Decreased lungs sounds at bases bilaterally. tachycardic, regulr, 2+ distal pulses, no significant extremity edema, abdomen soft and nontender. no rash or petechaie. normal mentation    MDM: SOB, moderate sized pleural effusions of unclear etiolgoy. given cancer hx and tachycardia was moderate to high risk for PE and CTA obtained which was negative except for effusion. will admit for further workup and evaluation of pleural effusions causing oxygen requirement and tachycardia.

## 2017-07-31 NOTE — ED PROVIDER NOTE - OBJECTIVE STATEMENT
Pt with active prostate cancer on chemo c/o of SOB and cough since Jan 2017 but significantly worsened over the past 3-4 days. Denies chest pain, pleuritic pain, fevers or chills. He called his oncologist today to make an appointment but was sent to the ED for further evaluation. Otherwise  no recent travel, leg swelling, hx of DVT/PE. Patient does note that the cough is worse at night lying down. He has no history of CHF.

## 2017-07-31 NOTE — H&P ADULT - NSHPPHYSICALEXAM_GEN_ALL_CORE
GENERAL: No acute distress.  HEENT: PERRL, EOMI, MM dry. Conjunctival pallor.  NECK: supple, no stiffness, no JVD, no thyromegaly  PULM: respirations non-labored. Decreased breath sounds lower lobes bilaterally  CV: regular rate and rhythm, no murmurs, gallops, or rubs  GI: abdomen soft, nontender, nondistended, no masses felt, normal bowel sounds  MSK: strength 5/5 bilateral upper/lower extremities. No joint swelling, erythema, or warmth.  LYMPH: no anterior cervical, posterior cervical, supraclavicular, or inguinal lymphadenopathy  NEURO: A&Ox3, no tremors, sensation intact  SKIN: no rashes, lesions, or edema. Pale skin.

## 2017-07-31 NOTE — ED ADULT NURSE NOTE - NS ED NURSE REPORT GIVEN TO FT
Alisia Soliman RN Alisia Soliman RN, Jody DOCKERY states patient is not neutrapenic and can be moved to [place]

## 2017-07-31 NOTE — H&P ADULT - PROBLEM SELECTOR PLAN 3
- With metastasis to bone. Lesion in kidney may be related to prostate metastasis as well  - Per wife, last PSA in May was 50 - With metastasis to bone. Lesion in kidney may be related to prostate metastasis as well  - Per wife, last PSA in May was 50  - Pt's last chemo was on 7/26, which was the 3rd week in the 4th week cycle and therefore should not need chemo this week if he is still inpatient.  - Patient need oncology consult for Spry[facility].

## 2017-08-01 DIAGNOSIS — J96.92 RESPIRATORY FAILURE, UNSPECIFIED WITH HYPERCAPNIA: ICD-10-CM

## 2017-08-01 DIAGNOSIS — R62.7 ADULT FAILURE TO THRIVE: ICD-10-CM

## 2017-08-01 LAB
ANION GAP SERPL CALC-SCNC: 15 MMOL/L — SIGNIFICANT CHANGE UP (ref 5–17)
BUN SERPL-MCNC: 11 MG/DL — SIGNIFICANT CHANGE UP (ref 7–23)
CALCIUM SERPL-MCNC: 8.1 MG/DL — LOW (ref 8.4–10.5)
CHLORIDE SERPL-SCNC: 98 MMOL/L — SIGNIFICANT CHANGE UP (ref 96–108)
CO2 SERPL-SCNC: 23 MMOL/L — SIGNIFICANT CHANGE UP (ref 22–31)
CREAT SERPL-MCNC: 0.57 MG/DL — SIGNIFICANT CHANGE UP (ref 0.5–1.3)
FERRITIN SERPL-MCNC: 668 NG/ML — HIGH (ref 30–400)
GLUCOSE SERPL-MCNC: 94 MG/DL — SIGNIFICANT CHANGE UP (ref 70–99)
HCT VFR BLD CALC: 27.2 % — LOW (ref 39–50)
HGB BLD-MCNC: 8.7 G/DL — LOW (ref 13–17)
IRON SATN MFR SERPL: 19 % — SIGNIFICANT CHANGE UP (ref 16–55)
IRON SATN MFR SERPL: 43 UG/DL — LOW (ref 45–165)
LDH SERPL L TO P-CCNC: 266 U/L — HIGH (ref 50–242)
MAGNESIUM SERPL-MCNC: 2 MG/DL — SIGNIFICANT CHANGE UP (ref 1.6–2.6)
MCHC RBC-ENTMCNC: 30.3 PG — SIGNIFICANT CHANGE UP (ref 27–34)
MCHC RBC-ENTMCNC: 32 GM/DL — SIGNIFICANT CHANGE UP (ref 32–36)
MCV RBC AUTO: 94.8 FL — SIGNIFICANT CHANGE UP (ref 80–100)
PHOSPHATE SERPL-MCNC: 2.6 MG/DL — SIGNIFICANT CHANGE UP (ref 2.5–4.5)
PLATELET # BLD AUTO: 102 K/UL — LOW (ref 150–400)
POTASSIUM SERPL-MCNC: 4 MMOL/L — SIGNIFICANT CHANGE UP (ref 3.5–5.3)
POTASSIUM SERPL-SCNC: 4 MMOL/L — SIGNIFICANT CHANGE UP (ref 3.5–5.3)
PROT SERPL-MCNC: 6.8 G/DL — SIGNIFICANT CHANGE UP (ref 6–8.3)
RBC # BLD: 2.87 M/UL — LOW (ref 4.2–5.8)
RBC # FLD: 17.3 % — HIGH (ref 10.3–14.5)
SODIUM SERPL-SCNC: 136 MMOL/L — SIGNIFICANT CHANGE UP (ref 135–145)
TIBC SERPL-MCNC: 230 UG/DL — SIGNIFICANT CHANGE UP (ref 220–430)
UIBC SERPL-MCNC: 187 UG/DL — SIGNIFICANT CHANGE UP (ref 110–370)
WBC # BLD: 4.91 K/UL — SIGNIFICANT CHANGE UP (ref 3.8–10.5)
WBC # FLD AUTO: 4.91 K/UL — SIGNIFICANT CHANGE UP (ref 3.8–10.5)

## 2017-08-01 PROCEDURE — 93306 TTE W/DOPPLER COMPLETE: CPT | Mod: 26

## 2017-08-01 PROCEDURE — 99233 SBSQ HOSP IP/OBS HIGH 50: CPT | Mod: GC

## 2017-08-01 PROCEDURE — 0399T: CPT

## 2017-08-01 PROCEDURE — 99223 1ST HOSP IP/OBS HIGH 75: CPT | Mod: GC

## 2017-08-01 RX ORDER — FILGRASTIM 480MCG/1.6
300 VIAL (ML) INJECTION DAILY
Qty: 0 | Refills: 0 | Status: COMPLETED | OUTPATIENT
Start: 2017-08-01 | End: 2017-08-03

## 2017-08-01 RX ADMIN — Medication 200 INTERNATIONAL UNIT(S): at 14:16

## 2017-08-01 RX ADMIN — SODIUM CHLORIDE 50 MILLILITER(S): 9 INJECTION INTRAMUSCULAR; INTRAVENOUS; SUBCUTANEOUS at 12:08

## 2017-08-01 RX ADMIN — Medication 1 TABLET(S): at 14:16

## 2017-08-01 RX ADMIN — BICALUTAMIDE 150 MILLIGRAM(S): 50 TABLET, FILM COATED ORAL at 14:16

## 2017-08-01 RX ADMIN — Medication 2000 UNIT(S): at 14:16

## 2017-08-01 RX ADMIN — Medication 325 MILLIGRAM(S): at 14:16

## 2017-08-01 RX ADMIN — SODIUM CHLORIDE 50 MILLILITER(S): 9 INJECTION INTRAMUSCULAR; INTRAVENOUS; SUBCUTANEOUS at 06:43

## 2017-08-01 RX ADMIN — Medication 2.5 MILLIGRAM(S): at 18:23

## 2017-08-01 RX ADMIN — ENOXAPARIN SODIUM 40 MILLIGRAM(S): 100 INJECTION SUBCUTANEOUS at 06:33

## 2017-08-01 RX ADMIN — Medication 2.5 MILLIGRAM(S): at 06:34

## 2017-08-01 NOTE — PROGRESS NOTE ADULT - PROBLEM SELECTOR PLAN 1
- Cause of dyspnea on exertion.  - Unlikely 2/2 heart failure, as pt has a low BNP. PNA unlikely as symptoms have been chronic without consolidative findings and pt has no leukocytosis and is afebrile.  PE is ruled out. Malignancy is highest on ddx. Would recommend thoracentesis to better evaluate.  -f/u Pulmonary recs - Cause of dyspnea on exertion.  - Unlikely 2/2 heart failure, as pt has a low BNP. PNA unlikely as symptoms have been chronic without consolidative findings and pt has no leukocytosis and is afebrile.  PE is ruled out. Malignancy is highest on ddx. Bedside echo to evaluate pleural effusions w/ possible diagnostic/therapeutic thoracentesis to better evaluate.  -f/u Pulmonary recs In setting of b/l loculated pleural effusion  - Unlikely 2/2 heart failure, as pt has a low BNP. PNA unlikely as symptoms have been chronic without consolidative findings and pt has no leukocytosis and is afebrile.  PE is ruled out. Malignancy is highest on ddx. Bedside echo to evaluate pleural effusions w/ possible diagnostic/therapeutic thoracentesis to better evaluate.  -f/u Pulmonary recs

## 2017-08-01 NOTE — CONSULT NOTE ADULT - ATTENDING COMMENTS
pt seen and examined     plan for thoracentesis  f/u echo
Lengthy discussion about current problem with patient and his wife.  She is very concerned and supportive but wants to take charge of many aspects of his care, including which drugs to use when.  He is very concerned about any risk associated with procedures and is seeking a guarantee that nothing will go wrong.  Discussed the issues including holding dasatinib as it may be cause of pleural effusions. Recommend giving days 2 and 3 of neupogen as per wife he had neutropenia with previous cycle and would not want to risk infection with thoracentesis. Reassured the patient that he will receive optimal care but that we can never guarantee that all will go well.  Advised to proceed one issue at a time to stabilize.

## 2017-08-01 NOTE — CONSULT NOTE ADULT - PROBLEM SELECTOR RECOMMENDATION 9
Currently would hold dasatinib in the acute setting given the fact that it can cause fluid retention and pleural effusions, this may represent a treatment side effect versus worsening disease in lung.   Please check PSA level while inpatient.   Would recommend pulmonary consult, patient may require therapeutic + diagnostic thoracentesis with cytology, although may be technically difficult with loculations. Currently would hold dasatinib in the acute setting given the fact that it can cause fluid retention and pleural effusions, this may represent a treatment side effect versus worsening disease in lung.   Also, very unclear benefit for dastinib in this clinical scenario. Asked family to bring in further records of genome sequencing for possible explanation. There was major study which examined the usefulness of dasatinib in combination with docetaxel in this setting (the READY trial) which showed no benefit. (Kemal Lopez, et. al. Docetaxel and dasatinib or placebo in men with metastatic castration-resistant prostate cancer (READY): a randomised, double-blind phase 3 trial. Lancet Oncol 2013; 14: 1307–16.)  Please check PSA level while inpatient.   Would recommend pulmonary consult, patient may require therapeutic + diagnostic thoracentesis with cytology, although may be technically difficult with loculations. Currently would hold dasatinib in the acute setting given the fact that it can cause fluid retention and pleural effusions, this may represent a treatment side effect versus worsening disease in lung.   Also, very unclear benefit for dasatinib in this clinical scenario. Asked family to bring in further records of genome sequencing for possible explanation. There was a major study which examined the usefulness of dasatinib in combination with docetaxel in this setting (the READY trial) which showed no benefit. (Kemal Lopez et. al. Docetaxel and dasatinib or placebo in men with metastatic castration-resistant prostate cancer (READY): a randomised, double-blind phase 3 trial. Lancet Oncol 2013; 14: 1307–16.)  Please check PSA level while inpatient.   The patient reports that his current regimen is to get three days of neupogen after day 15 treatment to prevent neutropenia.  He took one dose two days ago so would give 2 more doses of 300 to avoid neutropenia.  Would recommend pulmonary consult, patient may require therapeutic + diagnostic thoracentesis with cytology, although may be technically difficult with loculations.

## 2017-08-01 NOTE — PROGRESS NOTE ADULT - SUBJECTIVE AND OBJECTIVE BOX
Internal Medicine Accept Note:      Patient is a 67y old  Male who presents with a chief complaint of SOB (31 Jul 2017 22:32)        SUBJECTIVE / OVERNIGHT EVENTS:      MEDICATIONS  (STANDING):  enoxaparin Injectable 40 milliGRAM(s) SubCutaneous every 24 hours  sodium chloride 0.9%. 1000 milliLiter(s) (50 mL/Hr) IV Continuous <Continuous>  dronabinol 2.5 milliGRAM(s) Oral two times a day  bicalutamide 150 milliGRAM(s) Oral daily  dasatinib 50 milliGRAM(s) Oral daily  calcium carbonate 1250 mG + Vitamin D (OsCal 500 + D) 1 Tablet(s) Oral daily  vitamin E 200 International Unit(s) Oral daily  cholecalciferol 2000 Unit(s) Oral daily  multivitamin/minerals 1 Tablet(s) Oral daily  ferrous    sulfate 325 milliGRAM(s) Oral daily    MEDICATIONS  (PRN):        CAPILLARY BLOOD GLUCOSE        I&O's Summary      PHYSICAL EXAM  GENERAL: NAD, well-developed  HEAD:  Atraumatic, Normocephalic  EYES: EOMI, PERRLA, conjunctiva and sclera clear  NECK: Supple, No JVD  NEURO: AAOx3, Strength 5/5 x 4, CN II-XII intact, No focal deficits  CHEST/LUNG: Clear to auscultation bilaterally; No wheeze  HEART: Regular rate and rhythm; No murmurs, rubs, or gallops  ABDOMEN: Soft, Nontender, Nondistended; Bowel sounds present  EXTREMITIES:  2+ Peripheral Pulses, No clubbing, cyanosis, or edema  PSYCH: Affect appropriate  SKIN: No rashes or lesions    LABS:                        8.7    4.91  )-----------( 102      ( 01 Aug 2017 07:23 )             27.2     07-31    136  |  98  |  15  ----------------------------<  129<H>  4.0   |  24  |  0.65    Ca    9.4      31 Jul 2017 16:14    TPro  7.6  /  Alb  4.1  /  TBili  0.5  /  DBili  x   /  AST  29  /  ALT  17  /  AlkPhos  64  07-31    PT/INR - ( 31 Jul 2017 16:14 )   PT: 11.5 sec;   INR: 1.06 ratio         PTT - ( 31 Jul 2017 16:14 )  PTT:28.2 sec  CARDIAC MARKERS ( 31 Jul 2017 16:14 )  x     / <0.01 ng/mL / 92 U/L / x     / x          Urinalysis Basic - ( 31 Jul 2017 21:19 )    Color: x / Appearance: Clear / SG: >1.030 / pH: x  Gluc: x / Ketone: Negative  / Bili: Negative / Urobili: Negative   Blood: x / Protein: 30 mg/dL / Nitrite: Negative   Leuk Esterase: Negative / RBC: x / WBC x   Sq Epi: x / Non Sq Epi: x / Bacteria: x        RADIOLOGY & ADDITIONAL TESTS:    Imaging Personally Reviewed:  Consultant(s) Notes Reviewed:    Care Discussed with Consultants/Other Providers: Internal Medicine Accept Note:    CC: Patient is a 67y old  Male who presents with a chief complaint of SOB (2017 22:32)    HPI: 66 yo M with a PMH stage IV prostate cancer 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 He states the SOB has been gradually becoming worse over the last month and more acutely worsened over the past 2 days. The SOB occurs when he is walking up 1-2 flights of stairs. If he walks slowly on flat ground, he does not have SOB. Also endorses cough, which occurs at rest as well and is more pronounced when he lays down on either side. Denies symptoms lying down flat, and alternated between lying flat and using one pillow. Denies chest pain or palpitations. Traveled to [place] 1 month ago. No sick contacts. No known history of heart failure. Denies fevers, chills, N/V, diarrhea, abdominal pain, or blurry vision. Did have some headaches after using marijuana for treatment    Of note, his prostate cancer was diagnosed in , completed RT in , and started chemo 2017. His cycles last 4 weeks (3 weeks of chemo and 1 week of rest - 1st week of chemo is Avastin, Docetaxel, and Carboplatin and the 2nd/3rd weeks are Docetaxel/Carboplatin only). He is currently on his 8th cycle of chemo; last dose  (did not receive Carboplatin then due to SOB). Of note, his wife will usually give him Neupogen 5 mg after chemo as per oncologist (in [place] recommendations She also gave him a dose last night due to weakness. He endorses 18 lb weight loss over the past 7 months. Of note, he is BRAF positive, and has a mother who  at a young age of liver cancer and a sister who has breast cancer improving with treatment.            SUBJECTIVE / OVERNIGHT EVENTS:      MEDICATIONS  (STANDING):  enoxaparin Injectable 40 milliGRAM(s) SubCutaneous every 24 hours  sodium chloride 0.9%. 1000 milliLiter(s) (50 mL/Hr) IV Continuous <Continuous>  dronabinol 2.5 milliGRAM(s) Oral two times a day  bicalutamide 150 milliGRAM(s) Oral daily  dasatinib 50 milliGRAM(s) Oral daily  calcium carbonate 1250 mG + Vitamin D (OsCal 500 + D) 1 Tablet(s) Oral daily  vitamin E 200 International Unit(s) Oral daily  cholecalciferol 2000 Unit(s) Oral daily  multivitamin/minerals 1 Tablet(s) Oral daily  ferrous    sulfate 325 milliGRAM(s) Oral daily    MEDICATIONS  (PRN):        CAPILLARY BLOOD GLUCOSE        I&O's Summary      PHYSICAL EXAM  GENERAL: NAD, well-developed  HEAD:  Atraumatic, Normocephalic  EYES: EOMI, conjunctiva and sclera clear  NECK: Supple, No JVD  NEURO: AAOx3, No focal deficits  CHEST/LUNG: Clear to auscultation bilaterally; No wheeze  HEART: Regular rate and rhythm; No murmurs, rubs, or gallops  ABDOMEN: Soft, Nontender, distended; normoactive BS  EXTREMITIES:  2+ Peripheral Pulses, No clubbing, cyanosis, or edema  PSYCH: Affect appropriate  SKIN: 2x3cm ecchymotic lesion on RLQ, no rashes or other lesions    LABS:                        8.7    4.91  )-----------( 102      ( 01 Aug 2017 07:23 )             27.2         136  |  98  |  15  ----------------------------<  129<H>  4.0   |  24  |  0.65    Ca    9.4      2017 16:14    TPro  7.6  /  Alb  4.1  /  TBili  0.5  /  DBili  x   /  AST  29  /  ALT  17  /  AlkPhos  64      PT/INR - ( 2017 16:14 )   PT: 11.5 sec;   INR: 1.06 ratio         PTT - ( 2017 16:14 )  PTT:28.2 sec  CARDIAC MARKERS ( 2017 16:14 )  x     / <0.01 ng/mL / 92 U/L / x     / x          Urinalysis Basic - ( 2017 21:19 )    Color: x / Appearance: Clear / SG: >1.030 / pH: x  Gluc: x / Ketone: Negative  / Bili: Negative / Urobili: Negative   Blood: x / Protein: 30 mg/dL / Nitrite: Negative   Leuk Esterase: Negative / RBC: x / WBC x   Sq Epi: x / Non Sq Epi: x / Bacteria: x        RADIOLOGY & ADDITIONAL TESTS:    EXAM: CHEST PA \T\ LAT      PROCEDURE DATE: 2017          INTERPRETATION: CLINICAL INDICATION: Chest pain and shortness of breath for  2 months.    EXAM: Frontal and lateral views of the chest with no prior radiographs.    IMPRESSION:  The heart size is normal.  Small bilateral pleural effusions, right greater than left. Loss of volume  right lung.      EXAM: CT ANGIO CHEST (W)AW IC      PROCEDURE DATE: 2017          INTERPRETATION: CLINICAL INFORMATION: Shortness of breath, tachycardia, and  hypoxia.    PROCEDURE:  CT Pulmonary Angiogram was performed with intravenous contrast.    Intravenous contrast: 90 ml Omnipaque 350. 10 ml discarded.    Reconstructions were performed in axial thin, axial maximum intensity  projection, sagittal and coronal planes.    COMPARISON: No priors    FINDINGS:    LUNGS AND LARGE AIRWAYS: Patent central airways. No pulmonary nodules.  Bibasilar passive atelectasis.  PLEURA: Partially loculated moderate-sized bilateral pleural effusions,  right greater than left.  VESSELS: Adequate opacification of the pulmonary arterial tree. No pulmonary  artery embolism. Coronary artery calcifications. A large 4.0 cm ascending  thoracic aorta level of the right main pulmonary artery.  HEART: Heart size is normal. Trace pericardial effusion.  MEDIASTINUM AND DAVIE: No lymphadenopathy. Calcified paratracheal and  subcarinal lymph nodes.  CHEST WALL AND LOWER NECK: Within normal limits.  UPPER ABDOMEN: 2 cm peripheral splenic hypodensity, likely representing a  simple cyst. Large partially visualized 8.3 cm left renal complex cyst  calcified peripheral mural nodule.  BONES: Diffuse osseous sclerosis consistent with metastases. Degenerative  changes of the spine.    IMPRESSION:    No pulmonary artery embolism.    Partially loculated moderate-sized bilateral pleural effusions, right  greater than left.    4.0 cm ascending thoracic aorta.    Diffuse osseous metastases. Internal Medicine Accept Note:    CC: Patient is a 67y old  Male who presents with a chief complaint of SOB (2017 22:32)    HPI: 66 yo M with a PMH stage IV prostate cancer 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 He states the SOB has been gradually becoming worse over the last month and more acutely worsened over the past 2 days. The SOB occurs when he is walking up 1-2 flights of stairs. If he walks slowly on flat ground, he does not have SOB. Also endorses cough, which occurs at rest as well and is more pronounced when he lays down on either side. Denies symptoms lying down flat, and alternated between lying flat and using one pillow. Denies chest pain or palpitations. Traveled to [place] 1 month ago. No sick contacts. No known history of heart failure. Denies fevers, chills, N/V, diarrhea, abdominal pain, or blurry vision. Did have some headaches after using marijuana for treatment    Of note, his prostate cancer was diagnosed in , completed RT in , and started chemo 2017. His cycles last 4 weeks (3 weeks of chemo and 1 week of rest - 1st week of chemo is Avastin, Docetaxel, and Carboplatin and the 2nd/3rd weeks are Docetaxel/Carboplatin only). He is currently on his 8th cycle of chemo; last dose  (did not receive Carboplatin then due to SOB). Of note, his wife will usually give him Neupogen 5 mg after chemo as per oncologist (in [place] recommendations She also gave him a dose last night due to weakness. He endorses 18 lb weight loss over the past 7 months. Of note, he is BRAF positive, and has a mother who  at a young age of liver cancer and a sister who has breast cancer improving with treatment.            SUBJECTIVE / OVERNIGHT EVENTS: [facility]  States SOB is stable from admission .      MEDICATIONS  (STANDING):  enoxaparin Injectable 40 milliGRAM(s) SubCutaneous every 24 hours  sodium chloride 0.9%. 1000 milliLiter(s) (50 mL/Hr) IV Continuous <Continuous>  dronabinol 2.5 milliGRAM(s) Oral two times a day  bicalutamide 150 milliGRAM(s) Oral daily  dasatinib 50 milliGRAM(s) Oral daily  calcium carbonate 1250 mG + Vitamin D (OsCal 500 + D) 1 Tablet(s) Oral daily  vitamin E 200 International Unit(s) Oral daily  cholecalciferol 2000 Unit(s) Oral daily  multivitamin/minerals 1 Tablet(s) Oral daily  ferrous    sulfate 325 milliGRAM(s) Oral daily    MEDICATIONS  (PRN):        CAPILLARY BLOOD GLUCOSE        I&O's Summary      PHYSICAL EXAM  VS: T98.1  HI=556   DH=825/80   R=17   PO2=95%2L  GENERAL: NAD, well-developed  HEAD:  Atraumatic, Normocephalic  EYES: EOMI, conjunctiva and sclera clear  NECK: Supple, No JVD  NEURO: AAOx3, No focal deficits  CHEST/LUNG: decrased BS b/l bases.   HEART: Regular rate and rhythm; No murmurs, rubs, or gallops  ABDOMEN: Soft, Nontender, distended; normoactive BS  EXTREMITIES:  2+ Peripheral Pulses, No clubbing, cyanosis, or edema  PSYCH: Affect appropriate  SKIN: 2x3cm ecchymotic lesion on RLQ, no rashes or other lesions    LABS:                        8.7    4.91  )-----------( 102      ( 01 Aug 2017 07:23 )             27.2         136  |  98  |  15  ----------------------------<  129<H>  4.0   |  24  |  0.65    Ca    9.4      2017 16:14    TPro  7.6  /  Alb  4.1  /  TBili  0.5  /  DBili  x   /  AST  29  /  ALT  17  /  AlkPhos  64      PT/INR - ( 2017 16:14 )   PT: 11.5 sec;   INR: 1.06 ratio         PTT - ( 2017 16:14 )  PTT:28.2 sec  CARDIAC MARKERS ( 2017 16:14 )  x     / <0.01 ng/mL / 92 U/L / x     / x          Urinalysis Basic - ( 2017 21:19 )    Color: x / Appearance: Clear / SG: >1.030 / pH: x  Gluc: x / Ketone: Negative  / Bili: Negative / Urobili: Negative   Blood: x / Protein: 30 mg/dL / Nitrite: Negative   Leuk Esterase: Negative / RBC: x / WBC x   Sq Epi: x / Non Sq Epi: x / Bacteria: x        RADIOLOGY & ADDITIONAL TESTS:    EXAM: CHEST PA \T\ LAT      PROCEDURE DATE: 2017          INTERPRETATION: CLINICAL INDICATION: Chest pain and shortness of breath for  2 months.    EXAM: Frontal and lateral views of the chest with no prior radiographs.    IMPRESSION:  The heart size is normal.  Small bilateral pleural effusions, right greater than left. Loss of volume  right lung.      EXAM: CT ANGIO CHEST (W)AW IC      PROCEDURE DATE: 2017          INTERPRETATION: CLINICAL INFORMATION: Shortness of breath, tachycardia, and  hypoxia.    PROCEDURE:  CT Pulmonary Angiogram was performed with intravenous contrast.    Intravenous contrast: 90 ml Omnipaque 350. 10 ml discarded.    Reconstructions were performed in axial thin, axial maximum intensity  projection, sagittal and coronal planes.    COMPARISON: No priors    FINDINGS:    LUNGS AND LARGE AIRWAYS: Patent central airways. No pulmonary nodules.  Bibasilar passive atelectasis.  PLEURA: Partially loculated moderate-sized bilateral pleural effusions,  right greater than left.  VESSELS: Adequate opacification of the pulmonary arterial tree. No pulmonary  artery embolism. Coronary artery calcifications. A large 4.0 cm ascending  thoracic aorta level of the right main pulmonary artery.  HEART: Heart size is normal. Trace pericardial effusion.  MEDIASTINUM AND DAVIE: No lymphadenopathy. Calcified paratracheal and  subcarinal lymph nodes.  CHEST WALL AND LOWER NECK: Within normal limits.  UPPER ABDOMEN: 2 cm peripheral splenic hypodensity, likely representing a  simple cyst. Large partially visualized 8.3 cm left renal complex cyst  calcified peripheral mural nodule.  BONES: Diffuse osseous sclerosis consistent with metastases. Degenerative  changes of the spine.    IMPRESSION:    No pulmonary artery embolism.    Partially loculated moderate-sized bilateral pleural effusions, right  greater than left.    4.0 cm ascending thoracic aorta.    Diffuse osseous metastases.

## 2017-08-01 NOTE — CONSULT NOTE ADULT - SUBJECTIVE AND OBJECTIVE BOX
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 Has an associated nonproductive cough. No chest pain, fevers, hemoptysis. He was diagnosed with 2012 and underwent XRT in 2013. He is now on chemotherapy (last dose 7/26/17); currently on avastin, carboplatin/taxotere with dasatinib. Patient denies...      PAST MEDICAL & SURGICAL HISTORY:  Anemia  Prostate CA: stage IV with metastasis to bone  No significant past surgical history      FAMILY HISTORY:  Family history of breast cancer (Sibling)  Family history of liver cancer (Mother): age 40s      SOCIAL HISTORY:  Smoking: [X] Never Smoked  Substance Use: [X] Never Used [ ] Used ____  EtOH Use: Former use, occasional  Marital Status: [ ] Single [x]  [ ]  [ ]       Allergies    No Known Allergies    Intolerances      HOME MEDICATIONS:    REVIEW OF SYSTEMS:  Constitutional: [ ] negative [ ] fevers [ ] chills [ ] weight loss [ ] weight gain  HEENT: [ ] negative [ ] dry eyes [ ] eye irritation [ ] postnasal drip [ ] nasal congestion  CV: [ ] negative  [ ] chest pain [ ] orthopnea [ ] palpitations [ ] murmur  Resp: [ ] negative [ ] cough [ ] shortness of breath [ ] dyspnea [ ] wheezing [ ] sputum [ ] hemoptysis  GI: [ ] negative [ ] nausea [ ] vomiting [ ] diarrhea [ ] constipation [ ] abd pain [ ] dysphagia   : [ ] negative [ ] dysuria [ ] nocturia [ ] hematuria [ ] increased urinary frequency  Musculoskeletal: [ ] negative [ ] back pain [ ] myalgias [ ] arthralgias [ ] fracture  Skin: [ ] negative [ ] rash [ ] itch  Neurological: [ ] negative [ ] headache [ ] dizziness [ ] syncope [ ] weakness [ ] numbness  Psychiatric: [ ] negative [ ] anxiety [ ] depression  Endocrine: [ ] negative [ ] diabetes [ ] thyroid problem  Hematologic/Lymphatic: [ ] negative [ ] anemia [ ] bleeding problem  Allergic/Immunologic: [ ] negative [ ] itchy eyes [ ] nasal discharge [ ] hives [ ] angioedema  [ ] All other systems negative  [ ] Unable to assess ROS because ________    OBJECTIVE:  ICU Vital Signs Last 24 Hrs  T(C): 36.8 (01 Aug 2017 11:46), Max: 37.1 (01 Aug 2017 00:29)  T(F): 98.3 (01 Aug 2017 11:46), Max: 98.7 (01 Aug 2017 00:29)  HR: 99 (01 Aug 2017 11:46) (99 - 114)  BP: 113/79 (01 Aug 2017 11:46) (112/95 - 135/86)  BP(mean): --  ABP: --  ABP(mean): --  RR: 18 (01 Aug 2017 11:46) (17 - 20)  SpO2: 93% (01 Aug 2017 11:46) (92% - 97%)      PHYSICAL EXAM:  General:   HEENT:   Lymph Nodes:  Neck:   Respiratory:   Cardiovascular:   Abdomen:   Extremities:   Skin:   Neurological:  Psychiatry:    HOSPITAL MEDICATIONS:  enoxaparin Injectable 40 milliGRAM(s) SubCutaneous every 24 hours    dronabinol 2.5 milliGRAM(s) Oral two times a day    bicalutamide 150 milliGRAM(s) Oral daily  dasatinib 50 milliGRAM(s) Oral daily    calcium carbonate 1250 mG + Vitamin D (OsCal 500 + D) 1 Tablet(s) Oral daily  vitamin E 200 International Unit(s) Oral daily  cholecalciferol 2000 Unit(s) Oral daily  multivitamin/minerals 1 Tablet(s) Oral daily  ferrous    sulfate 325 milliGRAM(s) Oral daily      LABS:                        8.7    4.91  )-----------( 102      ( 01 Aug 2017 07:23 )             27.2     Hgb Trend: 8.7<--, 10.8<--  08-01    136  |  98  |  11  ----------------------------<  94  4.0   |  23  |  0.57    Ca    8.1<L>      01 Aug 2017 07:38  Phos  2.6     08-01  Mg     2.0     08-01    TPro  6.8  /  Alb  x   /  TBili  x   /  DBili  x   /  AST  x   /  ALT  x   /  AlkPhos  x   08-01    Creatinine Trend: 0.57<--, 0.65<--  PT/INR - ( 31 Jul 2017 16:14 )   PT: 11.5 sec;   INR: 1.06 ratio         PTT - ( 31 Jul 2017 16:14 )  PTT:28.2 sec  Urinalysis Basic - ( 31 Jul 2017 21:19 )    Color: x / Appearance: Clear / SG: >1.030 / pH: x  Gluc: x / Ketone: Negative  / Bili: Negative / Urobili: Negative   Blood: x / Protein: 30 mg/dL / Nitrite: Negative   Leuk Esterase: Negative / RBC: x / WBC x   Sq Epi: x / Non Sq Epi: x / Bacteria: x      RADIOLOGY:  [x] Reviewed and interpreted by me    CXR PA/LAT  Bilateral pleural effusions  No obvious infiltrates or PTX    CT chest  Bilateral large pleural effusions with compressive atelectasis R>L  No infiltrates  No PE CHIEF COMPLAINT:  Dyspnea x 2 months    HPI:  67 M with history of metastatic prostate cancer to bone presents with worsening dyspnea over past month and worsening over past couple of days. Dyspnea worse with exertion when walking up flight of stairs. Walking slowly of flat road does not cause any symptoms. Has an associated non productive cough. No chest pain, chills, fevers, hemoptysis, or syncope. He was diagnosed with 2012 and underwent XRT in 2013. He is now on chemotherapy (last dose 7/26/17); currently on avastin, carboplatin/taxotere with dasatinib. Imaging shows bilateral pleural effusions. Pulmonary consulted to evaluate for thoracentesis.    PAST MEDICAL & SURGICAL HISTORY:  Anemia  Prostate CA: stage IV with metastasis to bone  No significant past surgical history      FAMILY HISTORY:  Family history of breast cancer (Sibling)  Family history of liver cancer (Mother): age 40s      SOCIAL HISTORY:  Smoking: [X] Never Smoked  Substance Use: [X] Never Used [ ] Used ____  EtOH Use: Former use, occasional  Marital Status: [ ] Single [x]  [ ]  [ ]     Allergies    No Known Allergies    Intolerances      REVIEW OF SYSTEMS:  Constitutional: [ ] negative [-] fevers [-] chills [ ] weight loss [ ] weight gain  HEENT: [ ] negative [ ] dry eyes [ ] eye irritation [ ] postnasal drip [ ] nasal congestion  CV: [ ] negative  [-] chest pain [-] orthopnea [-] palpitations [ ] murmur  Resp: [ ] negative [+] cough [+] shortness of breath [ ] dyspnea [-] wheezing [-] sputum [ ] hemoptysis  GI: [ ] negative [-] nausea [-] vomiting [-] diarrhea [-] constipation [-] abd pain [ ] dysphagia   : [ ] negative [-] dysuria [ ] nocturia [ ] hematuria [ ] increased urinary frequency  Musculoskeletal: [ ] negative [ ] back pain [ ] myalgias [ ] arthralgias [ ] fracture  Skin: [-] negative [ ] rash [ ] itch  Neurological: [-] negative [ ] headache [ ] dizziness [ ] syncope [ ] weakness [ ] numbness  [-] All other systems negative  [ ] Unable to assess ROS because ________    OBJECTIVE:  ICU Vital Signs Last 24 Hrs  T(C): 36.8 (01 Aug 2017 11:46), Max: 37.1 (01 Aug 2017 00:29)  T(F): 98.3 (01 Aug 2017 11:46), Max: 98.7 (01 Aug 2017 00:29)  HR: 99 (01 Aug 2017 11:46) (99 - 114)  BP: 113/79 (01 Aug 2017 11:46) (112/95 - 135/86)  BP(mean): --  ABP: --  ABP(mean): --  RR: 18 (01 Aug 2017 11:46) (17 - 20)  SpO2: 93% (01 Aug 2017 11:46) (92% - 97%)      PHYSICAL EXAM:  General: No distress, comfortable  HEENT:   Lymph Nodes:  Neck: No JVD  Respiratory:   Cardiovascular:   Abdomen:   Extremities:   Skin:   Neurological:  Psychiatry:    HOSPITAL MEDICATIONS:  enoxaparin Injectable 40 milliGRAM(s) SubCutaneous every 24 hours    dronabinol 2.5 milliGRAM(s) Oral two times a day    bicalutamide 150 milliGRAM(s) Oral daily  dasatinib 50 milliGRAM(s) Oral daily    calcium carbonate 1250 mG + Vitamin D (OsCal 500 + D) 1 Tablet(s) Oral daily  vitamin E 200 International Unit(s) Oral daily  cholecalciferol 2000 Unit(s) Oral daily  multivitamin/minerals 1 Tablet(s) Oral daily  ferrous    sulfate 325 milliGRAM(s) Oral daily      LABS:                        8.7    4.91  )-----------( 102      ( 01 Aug 2017 07:23 )             27.2     Hgb Trend: 8.7<--, 10.8<--  08-01    136  |  98  |  11  ----------------------------<  94  4.0   |  23  |  0.57    Ca    8.1<L>      01 Aug 2017 07:38  Phos  2.6     08-01  Mg     2.0     08-01    TPro  6.8  /  Alb  x   /  TBili  x   /  DBili  x   /  AST  x   /  ALT  x   /  AlkPhos  x   08-01    Creatinine Trend: 0.57<--, 0.65<--  PT/INR - ( 31 Jul 2017 16:14 )   PT: 11.5 sec;   INR: 1.06 ratio         PTT - ( 31 Jul 2017 16:14 )  PTT:28.2 sec  Urinalysis Basic - ( 31 Jul 2017 21:19 )    Color: x / Appearance: Clear / SG: >1.030 / pH: x  Gluc: x / Ketone: Negative  / Bili: Negative / Urobili: Negative   Blood: x / Protein: 30 mg/dL / Nitrite: Negative   Leuk Esterase: Negative / RBC: x / WBC x   Sq Epi: x / Non Sq Epi: x / Bacteria: x      RADIOLOGY:  [x] Reviewed and interpreted by me    CXR PA/LAT  Bilateral pleural effusions  No obvious infiltrates or PTX    CT chest  Bilateral large pleural effusions with compressive atelectasis R>L  No infiltrates  No PE CHIEF COMPLAINT:  Dyspnea x 2 months    HPI:  67 M with history of metastatic prostate cancer to bone presents with worsening dyspnea over past month and worsening over past couple of days. Dyspnea worse with exertion when walking up flight of stairs. Walking slowly of flat road does not cause any symptoms. Has an associated non productive cough. No chest pain, chills, fevers, hemoptysis, or syncope. He was diagnosed with 2012 and underwent XRT in 2013. He is now on chemotherapy (last dose 7/26/17); currently on avastin, carboplatin/taxotere with dasatinib. Imaging shows bilateral pleural effusions. Pulmonary consulted to evaluate for thoracentesis.    PAST MEDICAL & SURGICAL HISTORY:  Anemia  Prostate CA: stage IV with metastasis to bone  No significant past surgical history      FAMILY HISTORY:  Family history of breast cancer (Sibling)  Family history of liver cancer (Mother): age 40s      SOCIAL HISTORY:  Smoking: [X] Never Smoked  Substance Use: [X] Never Used [ ] Used ____  EtOH Use: Former use, occasional  Marital Status: [ ] Single [x]  [ ]  [ ]     Allergies    No Known Allergies    Intolerances      REVIEW OF SYSTEMS:  Constitutional: [ ] negative [-] fevers [-] chills [ ] weight loss [ ] weight gain  HEENT: [ ] negative [ ] dry eyes [ ] eye irritation [ ] postnasal drip [ ] nasal congestion  CV: [ ] negative  [-] chest pain [-] orthopnea [-] palpitations [ ] murmur  Resp: [ ] negative [+] cough [+] shortness of breath [ ] dyspnea [-] wheezing [-] sputum [ ] hemoptysis  GI: [ ] negative [-] nausea [-] vomiting [-] diarrhea [-] constipation [-] abd pain [ ] dysphagia   : [ ] negative [-] dysuria [ ] nocturia [ ] hematuria [ ] increased urinary frequency  Musculoskeletal: [ ] negative [ ] back pain [ ] myalgias [ ] arthralgias [ ] fracture  Skin: [-] negative [ ] rash [ ] itch  Neurological: [-] negative [ ] headache [ ] dizziness [ ] syncope [ ] weakness [ ] numbness  [-] All other systems negative  [ ] Unable to assess ROS because ________    OBJECTIVE:  ICU Vital Signs Last 24 Hrs  T(C): 36.8 (01 Aug 2017 11:46), Max: 37.1 (01 Aug 2017 00:29)  T(F): 98.3 (01 Aug 2017 11:46), Max: 98.7 (01 Aug 2017 00:29)  HR: 99 (01 Aug 2017 11:46) (99 - 114)  BP: 113/79 (01 Aug 2017 11:46) (112/95 - 135/86)  BP(mean): --  ABP: --  ABP(mean): --  RR: 18 (01 Aug 2017 11:46) (17 - 20)  SpO2: 93% (01 Aug 2017 11:46) (92% - 97%)      PHYSICAL EXAM:  General: No distress, comfortable  Lymph Nodes: No cervical LAD  Neck: No JVD  Respiratory: Decreased breath sounds mid to lower posterior lung fields. Clear anterior sounds.  Cardiovascular: RRR, no murmur, no edema  Abdomen: Soft,   Extremities: No digital clubbing or cyanosis  Skin: Intact  Neurological: A&Ox3      [facility] MEDICATIONS:  enoxaparin Injectable 40 milliGRAM(s) SubCutaneous every 24 hours    dronabinol 2.5 milliGRAM(s) Oral two times a day    bicalutamide 150 milliGRAM(s) Oral daily  dasatinib 50 milliGRAM(s) Oral daily    calcium carbonate 1250 mG + Vitamin D (OsCal 500 + D) 1 Tablet(s) Oral daily  vitamin E 200 International Unit(s) Oral daily  cholecalciferol 2000 Unit(s) Oral daily  multivitamin/minerals 1 Tablet(s) Oral daily  ferrous    sulfate 325 milliGRAM(s) Oral daily      LABS:                        8.7    4.91  )-----------( 102      ( 01 Aug 2017 07:23 )             27.2     Hgb Trend: 8.7<--, 10.8<--  08-01    136  |  98  |  11  ----------------------------<  94  4.0   |  23  |  0.57    Ca    8.1<L>      01 Aug 2017 07:38  Phos  2.6     08-01  Mg     2.0     08-01    TPro  6.8  /  Alb  x   /  TBili  x   /  DBili  x   /  AST  x   /  ALT  x   /  AlkPhos  x   08-01    Creatinine Trend: 0.57<--, 0.65<--  PT/INR - ( 31 Jul 2017 16:14 )   PT: 11.5 sec;   INR: 1.06 ratio         PTT - ( 31 Jul 2017 16:14 )  PTT:28.2 sec  Urinalysis Basic - ( 31 Jul 2017 21:19 )    Color: x / Appearance: Clear / SG: >1.030 / pH: x  Gluc: x / Ketone: Negative  / Bili: Negative / Urobili: Negative   Blood: x / Protein: 30 mg/dL / Nitrite: Negative   Leuk Esterase: Negative / RBC: x / WBC x   Sq Epi: x / Non Sq Epi: x / Bacteria: x      RADIOLOGY:  [x] Reviewed and interpreted by me    CXR PA/LAT  Bilateral pleural effusions  No obvious infiltrates or PTX    CT chest  Bilateral large pleural effusions with compressive atelectasis R>L  No infiltrates  No     ASSESSMENT AND RECOMMENDATION    67 year old male with metastatic prostate cancer on active chemo presents with worsening dyspnea in setting of bilateral pleural effusions. Differential includes iatrogenic (specifically, destinib, which is known to cause pleural effusions), malignancy-related, and cardiac.    -Follow up echo to check for cardiomyopathy or valvular diease  -Bedside echo to evaluate pleural effusions and possible diagnostic / therapeutic thoracentesis CHIEF COMPLAINT:  Dyspnea x 2 months    HPI:  67 M with history of metastatic prostate cancer to bone presents with worsening dyspnea over past month and worsening over past couple of days. Dyspnea worse with exertion when walking up flight of stairs. Walking slowly of flat road does not cause any symptoms. Has an associated non productive cough. No chest pain, chills, fevers, hemoptysis, or syncope. He was diagnosed with 2012 and underwent XRT in 2013. He is now on chemotherapy (last dose 7/26/17); currently on avastin, carboplatin/taxotere with dasatinib. Imaging shows bilateral pleural effusions. Pulmonary consulted to evaluate for thoracentesis.    PAST MEDICAL & SURGICAL HISTORY:  Anemia  Prostate CA: stage IV with metastasis to bone  No significant past surgical history      FAMILY HISTORY:  Family history of breast cancer (Sibling)  Family history of liver cancer (Mother): age 40s      SOCIAL HISTORY:  Smoking: [X] Never Smoked  Substance Use: [X] Never Used [ ] Used ____  EtOH Use: Former use, occasional  Marital Status: [ ] Single [x]  [ ]  [ ]     Allergies    No Known Allergies    Intolerances      REVIEW OF SYSTEMS:  Constitutional: [ ] negative [-] fevers [-] chills [ ] weight loss [ ] weight gain  HEENT: [ ] negative [ ] dry eyes [ ] eye irritation [ ] postnasal drip [ ] nasal congestion  CV: [ ] negative  [-] chest pain [-] orthopnea [-] palpitations [ ] murmur  Resp: [ ] negative [+] cough [+] shortness of breath [ ] dyspnea [-] wheezing [-] sputum [ ] hemoptysis  GI: [ ] negative [-] nausea [-] vomiting [-] diarrhea [-] constipation [-] abd pain [ ] dysphagia   : [ ] negative [-] dysuria [ ] nocturia [ ] hematuria [ ] increased urinary frequency  Musculoskeletal: [ ] negative [ ] back pain [ ] myalgias [ ] arthralgias [ ] fracture  Skin: [-] negative [ ] rash [ ] itch  Neurological: [-] negative [ ] headache [ ] dizziness [ ] syncope [ ] weakness [ ] numbness  [-] All other systems negative  [ ] Unable to assess ROS because ________    OBJECTIVE:  ICU Vital Signs Last 24 Hrs  T(C): 36.8 (01 Aug 2017 11:46), Max: 37.1 (01 Aug 2017 00:29)  T(F): 98.3 (01 Aug 2017 11:46), Max: 98.7 (01 Aug 2017 00:29)  HR: 99 (01 Aug 2017 11:46) (99 - 114)  BP: 113/79 (01 Aug 2017 11:46) (112/95 - 135/86)  BP(mean): --  ABP: --  ABP(mean): --  RR: 18 (01 Aug 2017 11:46) (17 - 20)  SpO2: 93% (01 Aug 2017 11:46) (92% - 97%)      PHYSICAL EXAM:  General: No distress, comfortable  Lymph Nodes: No cervical LAD  Neck: No JVD  Respiratory: Decreased breath sounds mid to lower posterior lung fields. Clear anterior sounds.  Cardiovascular: RRR, no murmur, no edema  Abdomen: Soft,   Extremities: No digital clubbing or cyanosis  Skin: Intact  Neurological: A&Ox3      [facility] MEDICATIONS:  enoxaparin Injectable 40 milliGRAM(s) SubCutaneous every 24 hours    dronabinol 2.5 milliGRAM(s) Oral two times a day    bicalutamide 150 milliGRAM(s) Oral daily  dasatinib 50 milliGRAM(s) Oral daily    calcium carbonate 1250 mG + Vitamin D (OsCal 500 + D) 1 Tablet(s) Oral daily  vitamin E 200 International Unit(s) Oral daily  cholecalciferol 2000 Unit(s) Oral daily  multivitamin/minerals 1 Tablet(s) Oral daily  ferrous    sulfate 325 milliGRAM(s) Oral daily      LABS:                        8.7    4.91  )-----------( 102      ( 01 Aug 2017 07:23 )             27.2     Hgb Trend: 8.7<--, 10.8<--  08-01    136  |  98  |  11  ----------------------------<  94  4.0   |  23  |  0.57    Ca    8.1<L>      01 Aug 2017 07:38  Phos  2.6     08-01  Mg     2.0     08-01    TPro  6.8  /  Alb  x   /  TBili  x   /  DBili  x   /  AST  x   /  ALT  x   /  AlkPhos  x   08-01    Creatinine Trend: 0.57<--, 0.65<--  PT/INR - ( 31 Jul 2017 16:14 )   PT: 11.5 sec;   INR: 1.06 ratio         PTT - ( 31 Jul 2017 16:14 )  PTT:28.2 sec  Urinalysis Basic - ( 31 Jul 2017 21:19 )    Color: x / Appearance: Clear / SG: >1.030 / pH: x  Gluc: x / Ketone: Negative  / Bili: Negative / Urobili: Negative   Blood: x / Protein: 30 mg/dL / Nitrite: Negative   Leuk Esterase: Negative / RBC: x / WBC x   Sq Epi: x / Non Sq Epi: x / Bacteria: x      RADIOLOGY:  [x] Reviewed and interpreted by me    CXR PA/LAT  Bilateral pleural effusions  No obvious infiltrates or PTX    CT chest  Bilateral large pleural effusions with compressive atelectasis R>L  No infiltrates  No     ASSESSMENT AND RECOMMENDATION    67 year old male with metastatic prostate cancer on active chemo presents with worsening dyspnea in setting of bilateral pleural effusions. Differential includes iatrogenic (specifically, destinib, which is known to cause pleural effusions), malignancy-related, and cardiac.    -Follow up echo to check for cardiomyopathy or valvular diease  -Plan for therapeutic / diagnostic thoracentesis tomorrow.    Dylan Whitaker MD PGY5  Pulmonary and Critical Care Fellow  #[phone]

## 2017-08-01 NOTE — CONSULT NOTE ADULT - ASSESSMENT
68 y/o M with metastatic adenocarcinoma of prostate diagnosed in 2012 s/p ADT and XRT, s/p sipleucel T, Xtandi and Zytiga/prednisone with POD, s/p single agent docetaxel with no activity and now on Avastin/carboplatin/taxotere with dasatinib with excellent response, s/p 8 cycles, now admitted for SOB found with b/l moderate pleural effusions R>L.

## 2017-08-01 NOTE — PROGRESS NOTE ADULT - PROBLEM SELECTOR PLAN 2
- Likely in setting of malignancy and chemotherapy with SOB  - Despite , would obtain TTE in setting of chemotherapy, as patient may not have heart failure but may have cardiomyopathy 2/2 chemo (Docetaxel)  - Will give IVF in setting of BUN/creatinine ~22  - PT in setting of metastatic disease +/- increasing pleural effusions.

## 2017-08-01 NOTE — CONSULT NOTE ADULT - SUBJECTIVE AND OBJECTIVE BOX
HPI:  Onc hx:  66 y/o M with hx of stage IV adenocarcinoma of the prostate ([place] 8 in 3/6 cores on diagnosis) diagnosed in November 2012, managed in [place], initially locally advanced with enlarged L inguinal lymph node biopsy proven to be adenocarcinoma on presentation, initial bone scan with non-specific findings, s/p ADT (goserelin/bicalutamide) with XRT (completed June 2013), with PSA brennon reaching 0.2 in March 2014. PSA santiago to 4.3 in Sept 2014 and bone scan showed new uptake in R scapula (was having pain there). Scans at that time were also showing new lung nodules and mediastinal adenopathy. From 11/2014-12/2014 received Provenge (sipuleucel-T) and then was started on Xtandi in 12/2014. Patient had stable disease until 10/2016 when had progression of disease in his bones along with a rising PSA. In 11/2016 his PSA was 49.06, santiago to 156.4 in December 2016. At that point he started single agent docetaxel with continued rise of PSA and worsening bone pain. PSA santiago to 346 by 12/27/16 and at that time restarted casodex and was started on dasatinib. His PSA dropped to 212.9 by 1/17/17, and pain gradually improved. Chest imaging at that time showed interval decrease in size of mediastinal nodes and lung nodules, however R axillary nodes and paratracheal nodes were increasing in size and T-spine lesions were increased in intensity. On 2/2 his PSA santiago to 375. He had tumor sequencing which showed BRAF+ and L702H (family told me that they told him he can't have steroids because of that). On 2/15/17 he was started on regimen of carbo/docetaxel 3 weeks on, 1 week off (28 day cycle) with Avastin on day 1. His PSA has dropped to 58.4 by 6/6/2017. He was recently neutropenic and had to have a cycle delayed with neupogen given, and he had an apparently had carbo reaction in his last session and his having that held now.     On this admission, patient is presenting with SOB. He states the SOB has been gradually becoming worse over the last month and more acutely worsened over the past 2 days. The SOB occurs when he is walking up 1-2 flights of stairs. If he walks slowly on flat ground, he does not have SOB. Also endorses cough, which occurs at rest as well and is more pronounced when he lays down on either side. Denies symptoms lying down flat, and alternated between lying flat and using one pillow. Denies chest pain or palpitations. Traveled to [place] 1 month ago. No sick contacts. No known history of heart failure. Denies fevers, chills, N/V, diarrhea, abdominal pain, or blurry vision. Did have some headaches after using marijuana for treatment      PAST MEDICAL & SURGICAL HISTORY:  Anemia  Prostate CA: stage IV with metastasis to bone  No significant past surgical history      Allergies    No Known Allergies    Intolerances        MEDICATIONS  (STANDING):  enoxaparin Injectable 40 milliGRAM(s) SubCutaneous every 24 hours  dronabinol 2.5 milliGRAM(s) Oral two times a day  bicalutamide 150 milliGRAM(s) Oral daily  dasatinib 50 milliGRAM(s) Oral daily  calcium carbonate 1250 mG + Vitamin D (OsCal 500 + D) 1 Tablet(s) Oral daily  vitamin E 200 International Unit(s) Oral daily  cholecalciferol 2000 Unit(s) Oral daily  multivitamin/minerals 1 Tablet(s) Oral daily  ferrous    sulfate 325 milliGRAM(s) Oral daily    MEDICATIONS  (PRN):      FAMILY HISTORY:  Family history of breast cancer (Sibling)  Family history of liver cancer (Mother): age 40s      SOCIAL HISTORY: No EtOH, no tobacco    REVIEW OF SYSTEMS:    CONSTITUTIONAL: No weakness, fevers or chills, decreased appetite.  EYES/ENT: No visual changes;  No vertigo or throat pain   NECK: No pain or stiffness  RESPIRATORY: +dry cough, +dyspnea, worse on exertion  CARDIOVASCULAR: No chest pain or palpitations  GASTROINTESTINAL: No abdominal or epigastric pain. No nausea, vomiting, or hematemesis; No diarrhea or constipation. No melena or hematochezia.  GENITOURINARY: No dysuria, frequency or hematuria  NEUROLOGICAL: No numbness or weakness  SKIN: No itching, burning, rashes, or lesions   All other review of systems is negative unless indicated above.        T(F): 98.3 (08-01-17 @ 11:46), Max: 98.7 (08-01-17 @ 00:29)  HR: 99 (08-01-17 @ 11:46)  BP: 113/79 (08-01-17 @ 11:46)  RR: 18 (08-01-17 @ 11:46)  SpO2: 93% (08-01-17 @ 11:46)  Wt(kg): --    GENERAL: NAD, well-developed  HEAD:  Atraumatic, Normocephalic  EYES: EOMI, PERRLA, conjunctiva and sclera clear  NECK: Supple, No JVD  CHEST/LUNG: Decreased breath sounds on R>L  HEART: Regular rate and rhythm; No murmurs, rubs, or gallops  ABDOMEN: Soft, Nontender, Nondistended; Bowel sounds present  EXTREMITIES:  2+ Peripheral Pulses, No clubbing, cyanosis, or edema  NEUROLOGY: non-focal  SKIN: No rashes or lesions                          8.7    4.91  )-----------( 102      ( 01 Aug 2017 07:23 )             27.2       08-01    136  |  98  |  11  ----------------------------<  94  4.0   |  23  |  0.57    Ca    8.1<L>      01 Aug 2017 07:38  Phos  2.6     08-01  Mg     2.0     08-01    TPro  6.8  /  Alb  x   /  TBili  x   /  DBili  x   /  AST  x   /  ALT  x   /  AlkPhos  x   08-01      Lactate Dehydrogenase, Serum: 266 U/L (08-01 @ 07:38)  Magnesium, Serum: 2.0 mg/dL (08-01 @ 07:38)  Phosphorus Level, Serum: 2.6 mg/dL (08-01 @ 07:38)      PT/INR - ( 31 Jul 2017 16:14 )   PT: 11.5 sec;   INR: 1.06 ratio         PTT - ( 31 Jul 2017 16:14 )  PTT:28.2 sec      EXAM:  CT ANGIO CHEST (W)AW IC                        PROCEDURE DATE:  07/31/2017    IMPRESSION:  No pulmonary artery embolism.  Partially loculated moderate-sized bilateral pleural effusions, right   greater than left.  4.0 cm ascending thoracic aorta.  Diffuse osseous metastases. HPI:  Onc hx:  66 y/o M with hx of stage IV adenocarcinoma of the prostate ([place] 8 in 3/6 cores on diagnosis) diagnosed in November 2012, managed in [place], initially locally advanced with enlarged L inguinal lymph node biopsy proven to be adenocarcinoma on presentation, initial bone scan with non-specific findings, s/p ADT (goserelin/bicalutamide) with XRT (completed June 2013), with PSA brennon reaching 0.2 in March 2014. PSA santiago to 4.3 in Sept 2014 and bone scan showed new uptake in R scapula (was having pain there). Scans at that time were also showing new lung nodules and mediastinal adenopathy. From 11/2014-12/2014 received Provenge (sipuleucel-T) and then was started on Xtandi in 12/2014. Patient had stable disease until 10/2016 when had progression of disease in his bones along with a rising PSA. In 11/2016 his PSA was 49.06, santiago to 156.4 in December 2016. At that point he started single agent docetaxel with continued rise of PSA and worsening bone pain. PSA santiago to 346 by 12/27/16 and at that time restarted casodex and was started on dasatinib. His PSA dropped to 212.9 by 1/17/17, and pain gradually improved. Chest imaging at that time showed interval decrease in size of mediastinal nodes and lung nodules, however R axillary nodes and paratracheal nodes were increasing in size and T-spine lesions were increased in intensity. On 2/2 his PSA santiago to 375. He had tumor sequencing which showed BRAF+ and L702H (family told me that they told him he can't have steroids because of that). On 2/15/17 he was started on regimen of carbo/docetaxel 3 weeks on, 1 week off (28 day cycle) with Avastin on day 1. His PSA has dropped to 58.4 by 6/6/2017. He was recently neutropenic and had to have a cycle delayed with neupogen given, and he had an apparently had carbo reaction in his last session and the carbo is to be held with the next cycle.    On this admission, patient is presenting with SOB. He states the SOB has been gradually becoming worse over the last month and more acutely worsened over the past 2 days. The SOB occurs when he is walking up 1-2 flights of stairs. If he walks slowly on flat ground, he does not have SOB. Also endorses cough, which occurs at rest as well and is more pronounced when he lays down on either side. Denies symptoms lying down flat, and alternated between lying flat and using one pillow. Denies chest pain or palpitations. Traveled to [place] 1 month ago. No sick contacts. No known history of heart failure. Denies fevers, chills, N/V, diarrhea, abdominal pain, or blurry vision. Did have some headaches after using marijuana for treatment      PAST MEDICAL & SURGICAL HISTORY:  Anemia  Prostate CA: stage IV with metastasis to bone  No significant past surgical history      Allergies    No Known Allergies    Intolerances        MEDICATIONS  (STANDING):  enoxaparin Injectable 40 milliGRAM(s) SubCutaneous every 24 hours  dronabinol 2.5 milliGRAM(s) Oral two times a day  bicalutamide 150 milliGRAM(s) Oral daily  dasatinib 50 milliGRAM(s) Oral daily  calcium carbonate 1250 mG + Vitamin D (OsCal 500 + D) 1 Tablet(s) Oral daily  vitamin E 200 International Unit(s) Oral daily  cholecalciferol 2000 Unit(s) Oral daily  multivitamin/minerals 1 Tablet(s) Oral daily  ferrous    sulfate 325 milliGRAM(s) Oral daily    MEDICATIONS  (PRN):      FAMILY HISTORY:  Family history of breast cancer (Sibling)  Family history of liver cancer (Mother): age 40s      SOCIAL HISTORY: No EtOH, no tobacco    REVIEW OF SYSTEMS:    CONSTITUTIONAL: No weakness, fevers or chills, decreased appetite.  EYES/ENT: No visual changes;  No vertigo or throat pain   NECK: No pain or stiffness  RESPIRATORY: +dry cough, +dyspnea, worse on exertion  CARDIOVASCULAR: No chest pain or palpitations  GASTROINTESTINAL: No abdominal or epigastric pain. No nausea, vomiting, or hematemesis; No diarrhea or constipation. No melena or hematochezia.  GENITOURINARY: No dysuria, frequency or hematuria  NEUROLOGICAL: No numbness or weakness  SKIN: No itching, burning, rashes, or lesions   All other review of systems is negative unless indicated above.        T(F): 98.3 (08-01-17 @ 11:46), Max: 98.7 (08-01-17 @ 00:29)  HR: 99 (08-01-17 @ 11:46)  BP: 113/79 (08-01-17 @ 11:46)  RR: 18 (08-01-17 @ 11:46)  SpO2: 93% (08-01-17 @ 11:46)  Wt(kg): --    GENERAL: NAD, well-developed  HEAD:  Atraumatic, Normocephalic  EYES: EOMI, PERRLA, conjunctiva and sclera clear  NECK: Supple, No JVD  CHEST/LUNG: Decreased breath sounds on R>L  HEART: Regular rate and rhythm; No murmurs, rubs, or gallops  ABDOMEN: Soft, Nontender, Nondistended; Bowel sounds present  EXTREMITIES:  2+ Peripheral Pulses, No clubbing, cyanosis, or edema  NEUROLOGY: non-focal  SKIN: No rashes or lesions                          8.7    4.91  )-----------( 102      ( 01 Aug 2017 07:23 )             27.2       08-01    136  |  98  |  11  ----------------------------<  94  4.0   |  23  |  0.57    Ca    8.1<L>      01 Aug 2017 07:38  Phos  2.6     08-01  Mg     2.0     08-01    TPro  6.8  /  Alb  x   /  TBili  x   /  DBili  x   /  AST  x   /  ALT  x   /  AlkPhos  x   08-01      Lactate Dehydrogenase, Serum: 266 U/L (08-01 @ 07:38)  Magnesium, Serum: 2.0 mg/dL (08-01 @ 07:38)  Phosphorus Level, Serum: 2.6 mg/dL (08-01 @ 07:38)      PT/INR - ( 31 Jul 2017 16:14 )   PT: 11.5 sec;   INR: 1.06 ratio         PTT - ( 31 Jul 2017 16:14 )  PTT:28.2 sec      EXAM:  CT ANGIO CHEST (W)AW IC                        PROCEDURE DATE:  07/31/2017    IMPRESSION:  No pulmonary artery embolism.  Partially loculated moderate-sized bilateral pleural effusions, right   greater than left.  4.0 cm ascending thoracic aorta.  Diffuse osseous metastases.

## 2017-08-01 NOTE — PROGRESS NOTE ADULT - PROBLEM SELECTOR PLAN 3
- With metastases to bone. Lesion in kidney may be related to prostate metastasis as well  - Per wife, last PSA in May was 50  - Pt's last chemo was on 7/26, which was the 3rd week in the 4th week cycle and therefore should not need chemo this week if he is still inpatient.  -f/u with Hematology recs - With metastases to bone. Lesion in kidney may be related to prostate metastasis as well  - Per wife, last PSA in May was 50  - Pt's last chemo was on 7/26, which was the 3rd week in the 4th week cycle and therefore should not need chemo this week if he is still inpatient.  -hold dasatinib in acute setting as it can cause fluid retention and pleural effusions  -f/u PSA  -f/u with Hematology recs

## 2017-08-02 DIAGNOSIS — J96.21 ACUTE AND CHRONIC RESPIRATORY FAILURE WITH HYPOXIA: ICD-10-CM

## 2017-08-02 LAB
HCT VFR BLD CALC: 28.1 % — LOW (ref 39–50)
HGB BLD-MCNC: 9.1 G/DL — LOW (ref 13–17)
MCHC RBC-ENTMCNC: 30.4 PG — SIGNIFICANT CHANGE UP (ref 27–34)
MCHC RBC-ENTMCNC: 32.4 GM/DL — SIGNIFICANT CHANGE UP (ref 32–36)
MCV RBC AUTO: 94 FL — SIGNIFICANT CHANGE UP (ref 80–100)
PLATELET # BLD AUTO: 117 K/UL — LOW (ref 150–400)
PSA FLD-MCNC: 57.74 NG/ML — HIGH (ref 0–4)
RBC # BLD: 2.99 M/UL — LOW (ref 4.2–5.8)
RBC # FLD: 17.3 % — HIGH (ref 10.3–14.5)
WBC # BLD: 4.56 K/UL — SIGNIFICANT CHANGE UP (ref 3.8–10.5)
WBC # FLD AUTO: 4.56 K/UL — SIGNIFICANT CHANGE UP (ref 3.8–10.5)

## 2017-08-02 PROCEDURE — 99233 SBSQ HOSP IP/OBS HIGH 50: CPT | Mod: GC

## 2017-08-02 RX ADMIN — Medication 2.5 MILLIGRAM(S): at 05:36

## 2017-08-02 RX ADMIN — BICALUTAMIDE 150 MILLIGRAM(S): 50 TABLET, FILM COATED ORAL at 11:51

## 2017-08-02 RX ADMIN — ENOXAPARIN SODIUM 40 MILLIGRAM(S): 100 INJECTION SUBCUTANEOUS at 05:35

## 2017-08-02 RX ADMIN — Medication 325 MILLIGRAM(S): at 11:51

## 2017-08-02 RX ADMIN — Medication 300 MICROGRAM(S): at 16:38

## 2017-08-02 RX ADMIN — Medication 1 TABLET(S): at 11:51

## 2017-08-02 RX ADMIN — Medication 2000 UNIT(S): at 11:51

## 2017-08-02 RX ADMIN — Medication 2.5 MILLIGRAM(S): at 18:15

## 2017-08-02 RX ADMIN — Medication 200 INTERNATIONAL UNIT(S): at 11:51

## 2017-08-02 NOTE — PROGRESS NOTE ADULT - PROBLEM SELECTOR PLAN 3
- With metastases to bone. Lesion in kidney may be related to prostate metastasis as well  - Per wife, last PSA in May was 50  - Pt's last chemo was on 7/26, which was the 3rd week in the 4th week cycle and therefore should not need chemo this week if he is still inpatient.  -continue to hold dasatinib in acute setting as it can cause fluid retention and pleural effusions  -f/u PSA  -f/u with Hematology recs

## 2017-08-02 NOTE — PROGRESS NOTE ADULT - SUBJECTIVE AND OBJECTIVE BOX
Interval Events:  No events overnight  Minimal SOB at rest, worse with exertion    REVIEW OF SYSTEMS:  [x] All other systems negative  [ ] Unable to assess ROS because ________    OBJECTIVE:  ICU Vital Signs Last 24 Hrs  T(C): 36.8 (02 Aug 2017 03:49), Max: 36.8 (01 Aug 2017 11:46)  T(F): 98.3 (02 Aug 2017 03:49), Max: 98.3 (01 Aug 2017 11:46)  HR: 99 (02 Aug 2017 03:49) (98 - 99)  BP: 118/88 (02 Aug 2017 03:49) (112/80 - 118/88)  BP(mean): --  ABP: --  ABP(mean): --  RR: 18 (02 Aug 2017 03:49) (18 - 98)  SpO2: 95% (02 Aug 2017 03:49) (93% - 95%)        08-01 @ 07:01  -  08-02 @ 07:00  --------------------------------------------------------  IN: 600 mL / OUT: 0 mL / NET: 600 mL      PHYSICAL EXAM:  General: No distress, comfortable  Lymph Nodes: No cervical LAD  Neck: No JVD  Respiratory: Decreased breath sounds mid to lower posterior lung fields. Clear anterior sounds.  Cardiovascular: RRR, no murmur, no edema  Abdomen: Soft,   Extremities: No digital clubbing or cyanosis  Skin: Intact  Neurological: A&Ox3    [facility] MEDICATIONS:  enoxaparin Injectable 40 milliGRAM(s) SubCutaneous every 24 hours    dronabinol 2.5 milliGRAM(s) Oral two times a day    bicalutamide 150 milliGRAM(s) Oral daily    calcium carbonate 1250 mG + Vitamin D (OsCal 500 + D) 1 Tablet(s) Oral daily  vitamin E 200 International Unit(s) Oral daily  cholecalciferol 2000 Unit(s) Oral daily  multivitamin/minerals 1 Tablet(s) Oral daily  ferrous    sulfate 325 milliGRAM(s) Oral daily    [id number] Injectable 300 MICROGram(s) SubCutaneous daily      LABS:                        8.7    4.91  )-----------( 102      ( 01 Aug 2017 07:23 )             27.2     Hgb Trend: 8.7<--, 10.8<--  08-01    136  |  98  |  11  ----------------------------<  94  4.0   |  23  |  0.57    Ca    8.1<L>      01 Aug 2017 07:38  Phos  2.6     08-01  Mg     2.0     08-01    TPro  6.8  /  Alb  x   /  TBili  x   /  DBili  x   /  AST  x   /  ALT  x   /  AlkPhos  x   08-01    Creatinine Trend: 0.57<--, 0.65<--  PT/INR - ( 31 Jul 2017 16:14 )   PT: 11.5 sec;   INR: 1.06 ratio         PTT - ( 31 Jul 2017 16:14 )  PTT:28.2 sec  Urinalysis Basic - ( 31 Jul 2017 21:19 )    Color: x / Appearance: Clear / SG: >1.030 / pH: x  Gluc: x / Ketone: Negative  / Bili: Negative / Urobili: Negative   Blood: x / Protein: 30 mg/dL / Nitrite: Negative   Leuk Esterase: Negative / RBC: x / WBC x   Sq Epi: x / Non Sq Epi: x / Bacteria: x    ASSESSMENT AND RECOMMENDATION    67 year old male with metastatic prostate cancer on active chemo presents with worsening dyspnea in setting of bilateral pleural effusions. Differential includes iatrogenic (specifically, destinib, which is known to cause pleural effusions), malignancy-related, and cardiac.    -Plan for therapeutic / diagnostic today    Dylan Whitaker MD PGY5  Pulmonary and Critical Care Fellow  #[phone]

## 2017-08-02 NOTE — PROGRESS NOTE ADULT - PROBLEM SELECTOR PLAN 1
In setting of b/l loculated pleural effusion  - Unlikely 2/2 heart failure, as pt has a low BNP. PNA unlikely as symptoms have been chronic without consolidative findings and pt has no leukocytosis and is afebrile.  PE is ruled out. Malignancy is highest on ddx.   -Bedside echo to evaluate pleural effusions w/ diagnostic/therapeutic thoracentesis to better evaluate planned for today  -f/u Pulmonary recs

## 2017-08-02 NOTE — PHYSICAL THERAPY INITIAL EVALUATION ADULT - PERTINENT HX OF CURRENT PROBLEM, REHAB EVAL
66 y/o with metastatic prostate cancer on active chemo presents with worsening dyspnea in setting of bilateral pleural effusions. Differential includes iatrogenic (specifically, destinib, which is known to cause pleural effusions), malignancy-related, and cardiac. 66 y/o admitted to [facility] on 7/31/ 17  with metastatic prostate cancer on active chemo presents with worsening dyspnea in setting of bilateral pleural effusions. Differential includes iatrogenic (specifically, destinib, which is known to cause pleural effusions), malignancy-related, and cardiac. small B/L pleural effusions R greater than L

## 2017-08-02 NOTE — PHYSICAL THERAPY INITIAL EVALUATION ADULT - ADDITIONAL COMMENTS
lives w/ lives w/ wife in private home 3 + 3 steps to enter / no device/ reading glasses, hearing decreased L ear, R handed

## 2017-08-02 NOTE — PROGRESS NOTE ADULT - PROBLEM SELECTOR PROBLEM 1
Respiratory failure with hypercapnia, unspecified chronicity Acute on chronic respiratory failure with hypoxia

## 2017-08-02 NOTE — PROGRESS NOTE ADULT - SUBJECTIVE AND OBJECTIVE BOX
Patient is a 67y old  Male who presents with a chief complaint of SOB (31 Jul 2017 22:32)        SUBJECTIVE / OVERNIGHT EVENTS:       MEDICATIONS  (STANDING):  enoxaparin Injectable 40 milliGRAM(s) SubCutaneous every 24 hours  dronabinol 2.5 milliGRAM(s) Oral two times a day  bicalutamide 150 milliGRAM(s) Oral daily  calcium carbonate 1250 mG + Vitamin D (OsCal 500 + D) 1 Tablet(s) Oral daily  vitamin E 200 International Unit(s) Oral daily  cholecalciferol 2000 Unit(s) Oral daily  multivitamin/minerals 1 Tablet(s) Oral daily  ferrous    sulfate 325 milliGRAM(s) Oral daily  filgrastim-sndz Injectable 300 MICROGram(s) SubCutaneous daily    MEDICATIONS  (PRN):        CAPILLARY BLOOD GLUCOSE        I&O's Summary    01 Aug 2017 07:01  -  02 Aug 2017 06:50  --------------------------------------------------------  IN: 600 mL / OUT: 0 mL / NET: 600 mL        PHYSICAL EXAM  VS: T 36.8, HR 99, /88, RR 18, SpO2 95% on 1L O2 via NC  GENERAL: NAD, well-developed  HEAD:  Atraumatic, Normocephalic  EYES: EOMI, conjunctiva and sclera clear  NECK: Supple, No JVD  NEURO: AAOx3, No focal deficits  CHEST/LUNG: clear to auscultation b/l  HEART: tachy rate with normal rhythm; No murmurs, rubs, or gallops  ABDOMEN: Soft, Nontender, distended; normoactive BS  EXTREMITIES:  2+ Peripheral Pulses, No clubbing, cyanosis, or edema  PSYCH: Affect appropriate  SKIN: 2x3cm ecchymotic lesion on RLQ, no rashes or other lesions    LABS:                        8.7    4.91  )-----------( 102      ( 01 Aug 2017 07:23 )             27.2     08-01    136  |  98  |  11  ----------------------------<  94  4.0   |  23  |  0.57    Ca    8.1<L>      01 Aug 2017 07:38  Phos  2.6     08-01  Mg     2.0     08-01    TPro  6.8  /  Alb  x   /  TBili  x   /  DBili  x   /  AST  x   /  ALT  x   /  AlkPhos  x   08-01    PT/INR - ( 31 Jul 2017 16:14 )   PT: 11.5 sec;   INR: 1.06 ratio         PTT - ( 31 Jul 2017 16:14 )  PTT:28.2 sec  CARDIAC MARKERS ( 31 Jul 2017 16:14 )  x     / <0.01 ng/mL / 92 U/L / x     / x          Urinalysis Basic - ( 31 Jul 2017 21:19 )    Color: x / Appearance: Clear / SG: >1.030 / pH: x  Gluc: x / Ketone: Negative  / Bili: Negative / Urobili: Negative   Blood: x / Protein: 30 mg/dL / Nitrite: Negative   Leuk Esterase: Negative / RBC: x / WBC x   Sq Epi: x / Non Sq Epi: x / Bacteria: x        RADIOLOGY & ADDITIONAL TESTS:    Imaging Personally Reviewed:  Consultant(s) Notes Reviewed:    Care Discussed with Consultants/Other Providers: Patient is a 67y old  Male who presents with a chief complaint of SOB (31 Jul 2017 22:32)        SUBJECTIVE / OVERNIGHT EVENTS:     -diagnostic/therapeutic thoracentesis planned for today    Patient feels well overall this morning with less SOB, cough and weakness. Wife reports noticing a huge improvement in his respiratory rate. She stated that before admission, when the patient sleeps she would time his breaths with hers and notice he would breath twice for every single breath she took. Now she states that they breath almost at the same rate. He has also been ambulating with less SOB overnight. Patient denies fevers, chills, diaphoresis, CP, n/v/d/c.      MEDICATIONS  (STANDING):  enoxaparin Injectable 40 milliGRAM(s) SubCutaneous every 24 hours  dronabinol 2.5 milliGRAM(s) Oral two times a day  bicalutamide 150 milliGRAM(s) Oral daily  calcium carbonate 1250 mG + Vitamin D (OsCal 500 + D) 1 Tablet(s) Oral daily  vitamin E 200 International Unit(s) Oral daily  cholecalciferol 2000 Unit(s) Oral daily  multivitamin/minerals 1 Tablet(s) Oral daily  ferrous    sulfate 325 milliGRAM(s) Oral daily  filgrastim-sndz Injectable 300 MICROGram(s) SubCutaneous daily    MEDICATIONS  (PRN):        CAPILLARY BLOOD GLUCOSE        I&O's Summary    01 Aug 2017 07:01  -  02 Aug 2017 06:50  --------------------------------------------------------  IN: 600 mL / OUT: 0 mL / NET: 600 mL        PHYSICAL EXAM  VS: T 36.8, HR 99, /88, RR 18, SpO2 95% on 1L O2 via NC  GENERAL: NAD, well-developed  HEAD:  Atraumatic, Normocephalic  EYES: EOMI, conjunctiva and sclera clear  NECK: Supple, No JVD  NEURO: AAOx3, No focal deficits  CHEST/LUNG: clear to auscultation b/l  HEART: tachy rate with normal rhythm; No murmurs, rubs, or gallops  ABDOMEN: Soft, Nontender, distended; normoactive BS  EXTREMITIES:  2+ Peripheral Pulses, No clubbing, cyanosis, or edema  PSYCH: Affect appropriate  SKIN: 2x3cm ecchymotic lesion on RLQ, no rashes or other lesions    LABS:                        8.7    4.91  )-----------( 102      ( 01 Aug 2017 07:23 )             27.2     08-01    136  |  98  |  11  ----------------------------<  94  4.0   |  23  |  0.57    Ca    8.1<L>      01 Aug 2017 07:38  Phos  2.6     08-01  Mg     2.0     08-01    TPro  6.8  /  Alb  x   /  TBili  x   /  DBili  x   /  AST  x   /  ALT  x   /  AlkPhos  x   08-01    PT/INR - ( 31 Jul 2017 16:14 )   PT: 11.5 sec;   INR: 1.06 ratio         PTT - ( 31 Jul 2017 16:14 )  PTT:28.2 sec  CARDIAC MARKERS ( 31 Jul 2017 16:14 )  x     / <0.01 ng/mL / 92 U/L / x     / x          Urinalysis Basic - ( 31 Jul 2017 21:19 )    Color: x / Appearance: Clear / SG: >1.030 / pH: x  Gluc: x / Ketone: Negative  / Bili: Negative / Urobili: Negative   Blood: x / Protein: 30 mg/dL / Nitrite: Negative   Leuk Esterase: Negative / RBC: x / WBC x   Sq Epi: x / Non Sq Epi: x / Bacteria: x        RADIOLOGY & ADDITIONAL TESTS:    Imaging Personally Reviewed:  Consultant(s) Notes Reviewed:    Care Discussed with Consultants/Other Providers: Patient is a 67y old  Male who presents with a chief complaint of SOB (31 Jul 2017 22:32)        SUBJECTIVE / OVERNIGHT EVENTS:     -diagnostic/therapeutic thoracentesis planned for today    Patient feels well overall this morning with less SOB, cough and weakness. Wife reports noticing a huge improvement in his respiratory rate. She stated that before admission, when the patient sleeps she would time his breaths with hers and notice he would breath twice for every single breath she took. Now she states that they breath almost at the same rate. He has also been ambulating with less SOB overnight. Patient denies fevers, chills, diaphoresis, CP, n/v/d/c.      MEDICATIONS  (STANDING):  enoxaparin Injectable 40 milliGRAM(s) SubCutaneous every 24 hours  dronabinol 2.5 milliGRAM(s) Oral two times a day  bicalutamide 150 milliGRAM(s) Oral daily  calcium carbonate 1250 mG + Vitamin D (OsCal 500 + D) 1 Tablet(s) Oral daily  vitamin E 200 International Unit(s) Oral daily  cholecalciferol 2000 Unit(s) Oral daily  multivitamin/minerals 1 Tablet(s) Oral daily  ferrous    sulfate 325 milliGRAM(s) Oral daily  filgrastim-sndz Injectable 300 MICROGram(s) SubCutaneous daily    MEDICATIONS  (PRN):        CAPILLARY BLOOD GLUCOSE        I&O's Summary    01 Aug 2017 07:01  -  02 Aug 2017 06:50  --------------------------------------------------------  IN: 600 mL / OUT: 0 mL / NET: 600 mL        PHYSICAL EXAM  VS: T 36.8, HR 99, /88, RR 18, SpO2 95% on 1L O2 via NC  GENERAL: NAD, well-developed  HEAD:  Atraumatic, Normocephalic  EYES: EOMI, conjunctiva and sclera clear  NECK: Supple, No JVD  NEURO: AAOx3, No focal deficits  CHEST/LUNG: clear to auscultation b/l  HEART: tachy rate with normal rhythm; No murmurs, rubs, or gallops  ABDOMEN: Soft, Nontender, distended; normoactive BS  EXTREMITIES:  2+ Peripheral Pulses, No clubbing, cyanosis, or edema  PSYCH: Affect appropriate  SKIN: 2x3cm ecchymotic lesion on RLQ, no rashes or other lesions    LABS:                        8.7    4.91  )-----------( 102      ( 01 Aug 2017 07:23 )             27.2     08-01    136  |  98  |  11  ----------------------------<  94  4.0   |  23  |  0.57    Ca    8.1<L>      01 Aug 2017 07:38  Phos  2.6     08-01  Mg     2.0     08-01    TPro  6.8  /  Alb  x   /  TBili  x   /  DBili  x   /  AST  x   /  ALT  x   /  AlkPhos  x   08-01    PT/INR - ( 31 Jul 2017 16:14 )   PT: 11.5 sec;   INR: 1.06 ratio         PTT - ( 31 Jul 2017 16:14 )  PTT:28.2 sec  CARDIAC MARKERS ( 31 Jul 2017 16:14 )  x     / <0.01 ng/mL / 92 U/L / x     / x          Urinalysis Basic - ( 31 Jul 2017 21:19 )    Color: x / Appearance: Clear / SG: >1.030 / pH: x  Gluc: x / Ketone: Negative  / Bili: Negative / Urobili: Negative   Blood: x / Protein: 30 mg/dL / Nitrite: Negative   Leuk Esterase: Negative / RBC: x / WBC x   Sq Epi: x / Non Sq Epi: x / Bacteria: x        RADIOLOGY & ADDITIONAL TESTS:    Imaging Personally Reviewed:  Consultant(s) Notes Reviewed:    Care Discussed with Consultants/Other Providers: Pulmonolgy Fellow

## 2017-08-03 ENCOUNTER — RESULT REVIEW (OUTPATIENT)
Age: 67
End: 2017-08-03

## 2017-08-03 LAB
ALBUMIN FLD-MCNC: 3.5 G/DL — SIGNIFICANT CHANGE UP
AMYLASE FLD-CCNC: 43 U/L — SIGNIFICANT CHANGE UP
ANION GAP SERPL CALC-SCNC: 14 MMOL/L — SIGNIFICANT CHANGE UP (ref 5–17)
APTT BLD: 29.3 SEC — SIGNIFICANT CHANGE UP (ref 27.5–37.4)
B PERT IGG+IGM PNL SER: CLEAR — SIGNIFICANT CHANGE UP
BUN SERPL-MCNC: 10 MG/DL — SIGNIFICANT CHANGE UP (ref 7–23)
CALCIUM SERPL-MCNC: 8.3 MG/DL — LOW (ref 8.4–10.5)
CHLORIDE SERPL-SCNC: 99 MMOL/L — SIGNIFICANT CHANGE UP (ref 96–108)
CO2 SERPL-SCNC: 23 MMOL/L — SIGNIFICANT CHANGE UP (ref 22–31)
COLOR FLD: YELLOW — SIGNIFICANT CHANGE UP
COMMENT - FLUIDS: SIGNIFICANT CHANGE UP
CREAT SERPL-MCNC: 0.63 MG/DL — SIGNIFICANT CHANGE UP (ref 0.5–1.3)
FLUID INTAKE SUBSTANCE CLASS: SIGNIFICANT CHANGE UP
FLUID SEGMENTED GRANULOCYTES: 29 % — SIGNIFICANT CHANGE UP
GLUCOSE FLD-MCNC: 107 MG/DL — SIGNIFICANT CHANGE UP
GLUCOSE SERPL-MCNC: 91 MG/DL — SIGNIFICANT CHANGE UP (ref 70–99)
GRAM STN FLD: SIGNIFICANT CHANGE UP
HCT VFR BLD CALC: 29.2 % — LOW (ref 39–50)
HGB BLD-MCNC: 9.2 G/DL — LOW (ref 13–17)
INR BLD: 1.03 RATIO — SIGNIFICANT CHANGE UP (ref 0.88–1.16)
LDH SERPL L TO P-CCNC: 111 U/L — SIGNIFICANT CHANGE UP
LYMPHOCYTES # FLD: 42 % — SIGNIFICANT CHANGE UP
MCHC RBC-ENTMCNC: 29.7 PG — SIGNIFICANT CHANGE UP (ref 27–34)
MCHC RBC-ENTMCNC: 31.5 GM/DL — LOW (ref 32–36)
MCV RBC AUTO: 94.2 FL — SIGNIFICANT CHANGE UP (ref 80–100)
MESOTHL CELL # FLD: 4 % — SIGNIFICANT CHANGE UP
MONOS+MACROS # FLD: 7 % — SIGNIFICANT CHANGE UP
NIGHT BLUE STAIN TISS: SIGNIFICANT CHANGE UP
OTHER CELLS FLD MANUAL: 18 % — SIGNIFICANT CHANGE UP
PH FLD: 7.55 — SIGNIFICANT CHANGE UP
PLATELET # BLD AUTO: 112 K/UL — LOW (ref 150–400)
POTASSIUM SERPL-MCNC: 4.1 MMOL/L — SIGNIFICANT CHANGE UP (ref 3.5–5.3)
POTASSIUM SERPL-SCNC: 4.1 MMOL/L — SIGNIFICANT CHANGE UP (ref 3.5–5.3)
PROT FLD-MCNC: 5.2 G/DL — SIGNIFICANT CHANGE UP
PROTHROM AB SERPL-ACNC: 11.2 SEC — SIGNIFICANT CHANGE UP (ref 9.8–12.7)
RBC # BLD: 3.1 M/UL — LOW (ref 4.2–5.8)
RBC # FLD: 17.2 % — HIGH (ref 10.3–14.5)
RCV VOL RI: 58 /UL — HIGH (ref 0–5)
SODIUM SERPL-SCNC: 136 MMOL/L — SIGNIFICANT CHANGE UP (ref 135–145)
SPECIMEN SOURCE: SIGNIFICANT CHANGE UP
SPECIMEN SOURCE: SIGNIFICANT CHANGE UP
TOTAL NUCLEATED CELL COUNT, BODY FLUID: 540 /UL — HIGH (ref 0–5)
TRIGL FLD-MCNC: 15 MG/DL — SIGNIFICANT CHANGE UP
TUBE TYPE: SIGNIFICANT CHANGE UP
WBC # BLD: 8.68 K/UL — SIGNIFICANT CHANGE UP (ref 3.8–10.5)
WBC # FLD AUTO: 8.68 K/UL — SIGNIFICANT CHANGE UP (ref 3.8–10.5)

## 2017-08-03 PROCEDURE — 99233 SBSQ HOSP IP/OBS HIGH 50: CPT | Mod: GC

## 2017-08-03 PROCEDURE — 88112 CYTOPATH CELL ENHANCE TECH: CPT | Mod: 26

## 2017-08-03 PROCEDURE — 88108 CYTOPATH CONCENTRATE TECH: CPT | Mod: 26

## 2017-08-03 PROCEDURE — 88341 IMHCHEM/IMCYTCHM EA ADD ANTB: CPT | Mod: 26

## 2017-08-03 PROCEDURE — 32550 INSERT PLEURAL CATH: CPT | Mod: GC

## 2017-08-03 PROCEDURE — 88342 IMHCHEM/IMCYTCHM 1ST ANTB: CPT | Mod: 26

## 2017-08-03 PROCEDURE — 88305 TISSUE EXAM BY PATHOLOGIST: CPT | Mod: 26

## 2017-08-03 PROCEDURE — 99233 SBSQ HOSP IP/OBS HIGH 50: CPT | Mod: 25,GC

## 2017-08-03 RX ADMIN — BICALUTAMIDE 150 MILLIGRAM(S): 50 TABLET, FILM COATED ORAL at 13:20

## 2017-08-03 RX ADMIN — Medication 2.5 MILLIGRAM(S): at 18:17

## 2017-08-03 RX ADMIN — Medication 2000 UNIT(S): at 13:20

## 2017-08-03 RX ADMIN — Medication 1 TABLET(S): at 13:20

## 2017-08-03 RX ADMIN — Medication 2.5 MILLIGRAM(S): at 06:08

## 2017-08-03 RX ADMIN — ENOXAPARIN SODIUM 40 MILLIGRAM(S): 100 INJECTION SUBCUTANEOUS at 06:08

## 2017-08-03 RX ADMIN — Medication 325 MILLIGRAM(S): at 13:20

## 2017-08-03 RX ADMIN — Medication 200 INTERNATIONAL UNIT(S): at 13:20

## 2017-08-03 NOTE — PROGRESS NOTE ADULT - SUBJECTIVE AND OBJECTIVE BOX
CC: Patient is a 67y old  Male who presents with a chief complaint of SOB (31 Jul 2017 22:32)        SUBJECTIVE / OVERNIGHT EVENTS:    -Pending diagnostic/therapeutic thoracentesis - scheduled for today  -PSA: 57.74    Patient appears well this morning and states that his symptoms of SOB and cough have been decreasing each day since admission. His wife states that she notices a pattern of the patient coughing when he sits up or eats food. He denies CP, diaphoresis, fevers, chills, n/v/d/c.      MEDICATIONS  (STANDING):  enoxaparin Injectable 40 milliGRAM(s) SubCutaneous every 24 hours  dronabinol 2.5 milliGRAM(s) Oral two times a day  bicalutamide 150 milliGRAM(s) Oral daily  calcium carbonate 1250 mG + Vitamin D (OsCal 500 + D) 1 Tablet(s) Oral daily  vitamin E 200 International Unit(s) Oral daily  cholecalciferol 2000 Unit(s) Oral daily  multivitamin/minerals 1 Tablet(s) Oral daily  ferrous    sulfate 325 milliGRAM(s) Oral daily  filgrastim-sndz Injectable 300 MICROGram(s) SubCutaneous daily    MEDICATIONS  (PRN):        CAPILLARY BLOOD GLUCOSE        I&O's Summary    02 Aug 2017 07:01  -  03 Aug 2017 07:00  --------------------------------------------------------  IN: 940 mL / OUT: 0 mL / NET: 940 mL        PHYSICAL EXAM  VS: T 36.9, , /84, RR 18, SpO2 92% on 1L NC of O2  GENERAL: NAD, well-developed  HEAD:  Atraumatic, Normocephalic  EYES: EOMI, conjunctiva and sclera clear  NECK: Supple, No JVD  NEURO: AAOx3, No focal deficits  CHEST/LUNG: clear to auscultation b/l  HEART: tachy rate with normal rhythm; No murmurs, rubs, or gallops  ABDOMEN: Soft, Nontender, distended; normoactive BS  EXTREMITIES:  2+ Peripheral Pulses, No clubbing, cyanosis, or edema  PSYCH: Affect appropriate  SKIN: 2x3cm ecchymotic lesion on RLQ, no rashes or other lesions    LABS:                        9.1    4.56  )-----------( 117      ( 02 Aug 2017 07:34 )             28.1           PT/INR - ( 03 Aug 2017 07:05 )   PT: 11.2 sec;   INR: 1.03 ratio         PTT - ( 03 Aug 2017 07:05 )  PTT:29.3 sec          RADIOLOGY & ADDITIONAL TESTS:    No new tests. CC: Patient is a 67y old  Male who presents with a chief complaint of SOB (31 Jul 2017 22:32)        SUBJECTIVE / OVERNIGHT EVENTS:    -Pending diagnostic/therapeutic thoracentesis - scheduled for today  -PSA: 57.74    Patient appears well this morning and states that his symptoms of SOB and cough have been decreasing each day since admission. His wife states that she notices a pattern of the patient coughing when he sits up or eats food. He denies CP, diaphoresis, fevers, chills, n/v/d/c.      MEDICATIONS  (STANDING):  enoxaparin Injectable 40 milliGRAM(s) SubCutaneous every 24 hours  dronabinol 2.5 milliGRAM(s) Oral two times a day  bicalutamide 150 milliGRAM(s) Oral daily  calcium carbonate 1250 mG + Vitamin D (OsCal 500 + D) 1 Tablet(s) Oral daily  vitamin E 200 International Unit(s) Oral daily  cholecalciferol 2000 Unit(s) Oral daily  multivitamin/minerals 1 Tablet(s) Oral daily  ferrous    sulfate 325 milliGRAM(s) Oral daily  filgrastim-sndz Injectable 300 MICROGram(s) SubCutaneous daily    MEDICATIONS  (PRN):        CAPILLARY BLOOD GLUCOSE        I&O's Summary    02 Aug 2017 07:01  -  03 Aug 2017 07:00  --------------------------------------------------------  IN: 940 mL / OUT: 0 mL / NET: 940 mL        PHYSICAL EXAM  VS: T 36.9, , /84, RR 18, SpO2 92% on 1L NC of O2  GENERAL: NAD, well-developed  HEAD:  Atraumatic, Normocephalic  EYES: EOMI, conjunctiva and sclera clear  NECK: Supple, No JVD  NEURO: AAOx3, No focal deficits  CHEST/LUNG: clear to auscultation b/l  HEART: tachy rate with normal rhythm; No murmurs, rubs, or gallops  ABDOMEN: Soft, Nontender, distended; normoactive BS  EXTREMITIES:  2+ Peripheral Pulses, No clubbing, cyanosis, or edema  PSYCH: Affect appropriate  SKIN: 2x3cm ecchymotic lesion on RLQ, no rashes or other lesions    LABS:                        9.1    4.56  )-----------( 117      ( 02 Aug 2017 07:34 )             28.1           PT/INR - ( 03 Aug 2017 07:05 )   PT: 11.2 sec;   INR: 1.03 ratio         PTT - ( 03 Aug 2017 07:05 )  PTT:29.3 sec          RADIOLOGY & ADDITIONAL TESTS:    No new tests.    [facility] [place]

## 2017-08-03 NOTE — PROGRESS NOTE ADULT - PROBLEM SELECTOR PLAN 1
In setting of b/l loculated pleural effusion  - Unlikely 2/2 heart failure, as pt has a low BNP. PNA unlikely as symptoms have been chronic without consolidative findings and pt has no leukocytosis and is afebrile.  PE is ruled out. Malignancy is highest on ddx.   -Bedside echo to evaluate pleural effusions w/ diagnostic/therapeutic thoracentesis to better evaluate planned for today  -f/u Pulmonary recs In setting of b/l loculated pleural effusion  -s/p thoracenetsis on riht today, no complications per pulmonology.  -patient currently now off of oxygen, symptomatically much improved

## 2017-08-03 NOTE — PROGRESS NOTE ADULT - SUBJECTIVE AND OBJECTIVE BOX
Interval Events:  Clinically unchanged since yesterday  No events overnight  Plan for thora today  Mildly SOB at rest, worse with exertion    REVIEW OF SYSTEMS:  [x] All other systems negative  [ ] Unable to assess ROS because ________    OBJECTIVE:  ICU Vital Signs Last 24 Hrs  T(C): 36.9 (03 Aug 2017 04:42), Max: 36.9 (02 Aug 2017 14:55)  T(F): 98.5 (03 Aug 2017 04:42), Max: 98.5 (03 Aug 2017 04:42)  HR: 101 (03 Aug 2017 04:42) (89 - 122)  BP: 116/84 (03 Aug 2017 04:42) (115/83 - 127/86)  BP(mean): --  ABP: --  ABP(mean): --  RR: 18 (03 Aug 2017 04:42) (18 - 21)  SpO2: 92% (03 Aug 2017 04:42) (85% - 96%)        08-01 @ 07:01 - 08-02 @ 07:00  --------------------------------------------------------  IN: 720 mL / OUT: 0 mL / NET: 720 mL    08-02 @ 07:01  -  08-03 @ 06:44  --------------------------------------------------------  IN: 840 mL / OUT: 0 mL / NET: 840 mL      PHYSICAL EXAM:  General: No distress, comfortable  Lymph Nodes: No cervical LAD  Neck: No JVD  Respiratory: Decreased breath sounds mid to lower posterior lung fields. Clear anterior sounds.  Cardiovascular: RRR, no murmur, no edema  Abdomen: Soft,   Extremities: No digital clubbing or cyanosis  Skin: Intact  Neurological: A&Ox3    [facility] MEDICATIONS:  enoxaparin Injectable 40 milliGRAM(s) SubCutaneous every 24 hours      dronabinol 2.5 milliGRAM(s) Oral two times a day      bicalutamide 150 milliGRAM(s) Oral daily      calcium carbonate 1250 mG + Vitamin D (OsCal 500 + D) 1 Tablet(s) Oral daily  vitamin E 200 International Unit(s) Oral daily  cholecalciferol 2000 Unit(s) Oral daily  multivitamin/minerals 1 Tablet(s) Oral daily  ferrous    sulfate 325 milliGRAM(s) Oral daily    filgrastim-sndz Injectable 300 MICROGram(s) SubCutaneous daily        LABS:                        9.1    4.56  )-----------( 117      ( 02 Aug 2017 07:34 )             28.1     Hgb Trend: 9.1<--, 8.7<--, 10.8<--  08-01    136  |  98  |  11  ----------------------------<  94  4.0   |  23  |  0.57    Ca    8.1<L>      01 Aug 2017 07:38  Phos  2.6     08-01  Mg     2.0     08-01    TPro  6.8  /  Alb  x   /  TBili  x   /  DBili  x   /  AST  x   /  ALT  x   /  AlkPhos  x   08-01    Creatinine Trend: 0.57<--, 0.65<--      ASSESSMENT AND RECOMMENDATION    67 year old male with metastatic prostate cancer on active chemo presents with worsening dyspnea in setting of bilateral pleural effusions. Differential includes iatrogenic (specifically, destinib, which is known to cause pleural effusions), malignancy-related, and cardiac.    -Plan for therapeutic / diagnostic today    Dylan Whitaker MD PGY5  Pulmonary and Critical Care Fellow  #[phone] Interval Events:  Clinically unchanged since yesterday  No events overnight  Mildly SOB at rest, worse with exertion  Underwent right thoracentesis - removed approx 1600 mL serous fluid    REVIEW OF SYSTEMS:  [x] All other systems negative  [ ] Unable to assess ROS because ________    OBJECTIVE:  ICU Vital Signs Last 24 Hrs  T(C): 36.9 (03 Aug 2017 04:42), Max: 36.9 (02 Aug 2017 14:55)  T(F): 98.5 (03 Aug 2017 04:42), Max: 98.5 (03 Aug 2017 04:42)  HR: 101 (03 Aug 2017 04:42) (89 - 122)  BP: 116/84 (03 Aug 2017 04:42) (115/83 - 127/86)  BP(mean): --  ABP: --  ABP(mean): --  RR: 18 (03 Aug 2017 04:42) (18 - 21)  SpO2: 92% (03 Aug 2017 04:42) (85% - 96%)        08-01 @ 07:01  -  08-02 @ 07:00  --------------------------------------------------------  IN: 720 mL / OUT: 0 mL / NET: 720 mL    08-02 @ 07:01  -  08-03 @ 06:44  --------------------------------------------------------  IN: 840 mL / OUT: 0 mL / NET: 840 mL      PHYSICAL EXAM:  General: No distress, comfortable  Lymph Nodes: No cervical LAD  Neck: No JVD  Respiratory: Decreased breath sounds mid to lower posterior lung fields. Clear anterior sounds.  Cardiovascular: RRR, no murmur, no edema  Abdomen: Soft,   Extremities: No digital clubbing or cyanosis  Skin: Intact  Neurological: [address] MEDICATIONS:  enoxaparin Injectable 40 milliGRAM(s) SubCutaneous every 24 hours      dronabinol 2.5 milliGRAM(s) Oral two times a day      bicalutamide 150 milliGRAM(s) Oral daily      calcium carbonate 1250 mG + Vitamin D (OsCal 500 + D) 1 Tablet(s) Oral daily  vitamin E 200 International Unit(s) Oral daily  cholecalciferol 2000 Unit(s) Oral daily  multivitamin/minerals 1 Tablet(s) Oral daily  ferrous    sulfate 325 milliGRAM(s) Oral daily    filgrastim-sndz Injectable 300 MICROGram(s) SubCutaneous daily        LABS:                        9.1    4.56  )-----------( 117      ( 02 Aug 2017 07:34 )             28.1     Hgb Trend: 9.1<--, 8.7<--, 10.8<--  08-01    136  |  98  |  11  ----------------------------<  94  4.0   |  23  |  0.57    Ca    8.1<L>      01 Aug 2017 07:38  Phos  2.6     08-01  Mg     2.0     08-01    TPro  6.8  /  Alb  x   /  TBili  x   /  DBili  x   /  AST  x   /  ALT  x   /  AlkPhos  x   08-01    Creatinine Trend: 0.57<--, 0.65<--      ASSESSMENT AND RECOMMENDATION    67 year old male with metastatic prostate cancer on active chemo presents with worsening dyspnea in setting of bilateral pleural effusions. Differential includes iatrogenic (specifically, destinib, which is known to cause pleural effusions), malignancy-related, and cardiac.    -Had right sided thoracentesis today  -Sent for fluid analysis, cytology  -Awaiting results    Dylan Whitaker MD PGY5  Pulmonary and Critical Care Fellow  #[phone]

## 2017-08-03 NOTE — PROGRESS NOTE ADULT - PROBLEM SELECTOR PLAN 3
- With metastases to bone. Lesion in kidney may be related to prostate metastasis as well  - PSA currently 57.74; per wife, last PSA in May was 50  - Pt's last chemo was on 7/26, which was the 3rd week in the 4th week cycle and therefore should not need chemo this week if he is still inpatient.  -continue to hold dasatinib in acute setting as it can cause fluid retention and pleural effusions  -f/u PSA  -f/u with Hematology recs

## 2017-08-04 ENCOUNTER — TRANSCRIPTION ENCOUNTER (OUTPATIENT)
Age: 67
End: 2017-08-04

## 2017-08-04 VITALS — OXYGEN SATURATION: 97 % | HEART RATE: 120 BPM | RESPIRATION RATE: 18 BRPM

## 2017-08-04 DIAGNOSIS — J96.00 ACUTE RESPIRATORY FAILURE, UNSPECIFIED WHETHER WITH HYPOXIA OR HYPERCAPNIA: ICD-10-CM

## 2017-08-04 PROCEDURE — 88342 IMHCHEM/IMCYTCHM 1ST ANTB: CPT

## 2017-08-04 PROCEDURE — 87116 MYCOBACTERIA CULTURE: CPT

## 2017-08-04 PROCEDURE — 83550 IRON BINDING TEST: CPT

## 2017-08-04 PROCEDURE — 85730 THROMBOPLASTIN TIME PARTIAL: CPT

## 2017-08-04 PROCEDURE — 87075 CULTR BACTERIA EXCEPT BLOOD: CPT

## 2017-08-04 PROCEDURE — 93005 ELECTROCARDIOGRAM TRACING: CPT

## 2017-08-04 PROCEDURE — 82550 ASSAY OF CK (CPK): CPT

## 2017-08-04 PROCEDURE — 84484 ASSAY OF TROPONIN QUANT: CPT

## 2017-08-04 PROCEDURE — 99285 EMERGENCY DEPT VISIT HI MDM: CPT | Mod: 25

## 2017-08-04 PROCEDURE — 88341 IMHCHEM/IMCYTCHM EA ADD ANTB: CPT

## 2017-08-04 PROCEDURE — 93306 TTE W/DOPPLER COMPLETE: CPT

## 2017-08-04 PROCEDURE — 84478 ASSAY OF TRIGLYCERIDES: CPT

## 2017-08-04 PROCEDURE — 82728 ASSAY OF FERRITIN: CPT

## 2017-08-04 PROCEDURE — 93356 MYOCRD STRAIN IMG SPCKL TRCK: CPT

## 2017-08-04 PROCEDURE — 99239 HOSP IP/OBS DSCHRG MGMT >30: CPT

## 2017-08-04 PROCEDURE — 97161 PT EVAL LOW COMPLEX 20 MIN: CPT

## 2017-08-04 PROCEDURE — 83880 ASSAY OF NATRIURETIC PEPTIDE: CPT

## 2017-08-04 PROCEDURE — 83986 ASSAY PH BODY FLUID NOS: CPT

## 2017-08-04 PROCEDURE — 81001 URINALYSIS AUTO W/SCOPE: CPT

## 2017-08-04 PROCEDURE — 83735 ASSAY OF MAGNESIUM: CPT

## 2017-08-04 PROCEDURE — 82042 OTHER SOURCE ALBUMIN QUAN EA: CPT

## 2017-08-04 PROCEDURE — 84100 ASSAY OF PHOSPHORUS: CPT

## 2017-08-04 PROCEDURE — G0103: CPT

## 2017-08-04 PROCEDURE — 71275 CT ANGIOGRAPHY CHEST: CPT

## 2017-08-04 PROCEDURE — 87206 SMEAR FLUORESCENT/ACID STAI: CPT

## 2017-08-04 PROCEDURE — 80053 COMPREHEN METABOLIC PANEL: CPT

## 2017-08-04 PROCEDURE — 82150 ASSAY OF AMYLASE: CPT

## 2017-08-04 PROCEDURE — 84157 ASSAY OF PROTEIN OTHER: CPT

## 2017-08-04 PROCEDURE — 84155 ASSAY OF PROTEIN SERUM: CPT

## 2017-08-04 PROCEDURE — 87015 SPECIMEN INFECT AGNT CONCNTJ: CPT

## 2017-08-04 PROCEDURE — 88112 CYTOPATH CELL ENHANCE TECH: CPT

## 2017-08-04 PROCEDURE — 80048 BASIC METABOLIC PNL TOTAL CA: CPT

## 2017-08-04 PROCEDURE — 71046 X-RAY EXAM CHEST 2 VIEWS: CPT

## 2017-08-04 PROCEDURE — 87102 FUNGUS ISOLATION CULTURE: CPT

## 2017-08-04 PROCEDURE — 87205 SMEAR GRAM STAIN: CPT

## 2017-08-04 PROCEDURE — 83615 LACTATE (LD) (LDH) ENZYME: CPT

## 2017-08-04 PROCEDURE — 82945 GLUCOSE OTHER FLUID: CPT

## 2017-08-04 PROCEDURE — 89051 BODY FLUID CELL COUNT: CPT

## 2017-08-04 PROCEDURE — 85027 COMPLETE CBC AUTOMATED: CPT

## 2017-08-04 PROCEDURE — 87070 CULTURE OTHR SPECIMN AEROBIC: CPT

## 2017-08-04 PROCEDURE — 85610 PROTHROMBIN TIME: CPT

## 2017-08-04 PROCEDURE — 88305 TISSUE EXAM BY PATHOLOGIST: CPT

## 2017-08-04 RX ORDER — DRONABINOL 2.5 MG
1 CAPSULE ORAL
Qty: 0 | Refills: 0 | COMMUNITY
Start: 2017-08-04

## 2017-08-04 RX ORDER — BICALUTAMIDE 50 MG/1
3 TABLET, FILM COATED ORAL
Qty: 0 | Refills: 0 | COMMUNITY
Start: 2017-08-04

## 2017-08-04 RX ADMIN — ENOXAPARIN SODIUM 40 MILLIGRAM(S): 100 INJECTION SUBCUTANEOUS at 05:51

## 2017-08-04 RX ADMIN — Medication 200 INTERNATIONAL UNIT(S): at 11:34

## 2017-08-04 RX ADMIN — Medication 1 TABLET(S): at 11:34

## 2017-08-04 RX ADMIN — Medication 325 MILLIGRAM(S): at 11:34

## 2017-08-04 RX ADMIN — BICALUTAMIDE 150 MILLIGRAM(S): 50 TABLET, FILM COATED ORAL at 11:34

## 2017-08-04 RX ADMIN — Medication 2000 UNIT(S): at 11:34

## 2017-08-04 RX ADMIN — Medication 2.5 MILLIGRAM(S): at 05:51

## 2017-08-04 RX ADMIN — Medication 2.5 MILLIGRAM(S): at 17:45

## 2017-08-04 NOTE — PROGRESS NOTE ADULT - SUBJECTIVE AND OBJECTIVE BOX
Patient is a 67y old  Male who presents with a chief complaint of SOB    SUBJECTIVE / OVERNIGHT EVENTS:   Patient seen and examined at bedside. Feels well this morning, is breathing comfortably on room air and wants to go home. Cough is significantly improved.       MEDICATIONS  (STANDING):  enoxaparin Injectable 40 milliGRAM(s) SubCutaneous every 24 hours  dronabinol 2.5 milliGRAM(s) Oral two times a day  bicalutamide 150 milliGRAM(s) Oral daily  calcium carbonate 1250 mG + Vitamin D (OsCal 500 + D) 1 Tablet(s) Oral daily  vitamin E 200 International Unit(s) Oral daily  cholecalciferol 2000 Unit(s) Oral daily  multivitamin/minerals 1 Tablet(s) Oral daily  ferrous    sulfate 325 milliGRAM(s) Oral daily        I&O's Summary    03 Aug 2017 07:01  -  04 Aug 2017 07:00  --------------------------------------------------------  IN: 900 mL / OUT: 1600 mL / NET: -700 mL        PHYSICAL EXAM:  GENERAL: NAD  HEENT: conjunctiva and sclera clear, neck supple, no JVD, no erythema or exudates in the oropharynx   CHEST/LUNG: Mild bibasilar crackles, R>L   HEART: Regular rate and rhythm, no murmurs, rubs, or gallops  ABDOMEN: Soft, nontender, nondistended, normal bowel sounds   EXTREMITIES:  2+ Peripheral pulses, no clubbing, cyanosis, or edema  PSYCH: AAOx3  NEUROLOGY: No focal deficits     LABS:                        9.2    8.68  )-----------( 112      ( 03 Aug 2017 09:03 )             29.2     08-03    136  |  99  |  10  ----------------------------<  91  4.1   |  23  |  0.63    Ca    8.3<L>      03 Aug 2017 08:47      PT/INR - ( 03 Aug 2017 07:05 )   PT: 11.2 sec;   INR: 1.03 ratio         PTT - ( 03 Aug 2017 07:05 )  PTT:29.3 sec          RADIOLOGY & ADDITIONAL TESTS:    Imaging Personally Reviewed:    Consultant(s) Notes Reviewed:   Pulm     Care Discussed with Consultants/Other Providers:

## 2017-08-04 NOTE — DISCHARGE NOTE ADULT - PROVIDER TOKENS
FREE:[LAST:[Tad],FIRST:[Marley],PHONE:[(   )    -],FAX:[(   )    -],ADDRESS:[[phone]]] FREE:[LAST:[Tad],FIRST:[Marley],PHONE:[(   )    -],FAX:[(   )    -],ADDRESS:[[phone]]],TOKEN:'[id number]'

## 2017-08-04 NOTE — DISCHARGE NOTE ADULT - MEDICATION SUMMARY - MEDICATIONS TO TAKE
I will START or STAY ON the medications listed below when I get home from the hospital:    iron tablet  -- 1 tab(s) by mouth once a day  -- Indication: For Anemia    dronabinol 2.5 mg oral capsule  -- 1 cap(s) by mouth 2 times a day  -- Indication: For Nausea    bicalutamide 50 mg oral tablet  -- 3 tab(s) by mouth once a day  -- Indication: For Prostate cancer    Xgeva 120 mg/1.7 mL subcutaneous solution  -- 1 dose(s) subcutaneous once a month  -- Indication: For Prostate cancer    Probiotic Formula oral capsule  -- 1 cap(s) by mouth once a day  -- Indication: For health maintenance    Centrum Men's  -- 1 tab(s) by mouth once a day  -- Indication: For vitamin supplement    Calcium 600+D oral tablet  -- 2 tab(s) by mouth once a day  -- Indication: For vitamin supplement    Vitamin D3 2000 intl units oral tablet  -- 1 tab(s) by mouth once a day  -- Indication: For vitamin supplement

## 2017-08-04 NOTE — PROGRESS NOTE ADULT - PROBLEM SELECTOR PLAN 1
In setting of b/l loculated pleural effusion  -s/p thoracentesis on right yesterday, no complications, doing much better symptomatically   - Pleural fluid exudative (protein 5.2, serum protein 6.8, ratio 0.7), likely malignant In setting of b/l loculated pleural effusion  -s/p thoracentesis on right yesterday, no complications, doing much better symptomatically   - Pleural fluid exudative (protein 5.2, serum protein 6.8, ratio 0.7), likely malignant  Patient saturating 96% on room air at rest, however, drops to 85% on ambulation. He will require O2 for home. In setting of b/l loculated pleural effusion  -s/p thoracentesis on right yesterday, no complications, doing much better symptomatically   - Pleural fluid exudative (protein 5.2, serum protein 6.8, ratio 0.7), likely malignant  Patient saturating 96% on room air at rest, however, drops to 85% on ambulation. With 2L O2 on ambulation, SpO2 comes up to 97%. He will require O2 for home. In setting of b/l loculated pleural effusion  -s/p thoracentesis on right yesterday, no complications, doing much better symptomatically. Though patient's respiratory status improved with therapeutic thoracentesis, he is still hypoxic on ambulation. As patient has metastatic lung disease from prostate cancer, he will likely require supplement oxygen chronically.   - Pleural fluid exudative (protein 5.2, serum protein 6.8, ratio 0.7), likely malignant  Patient saturating 96% on room air at rest, however, drops to 85% on ambulation. With 2L O2 on ambulation, SpO2 comes up to 97%. He will require O2 for home.

## 2017-08-04 NOTE — DISCHARGE NOTE ADULT - HOSPITAL COURSE
HPI:  68 yo M with pmhx of stage IV prostate cancer with metastasis to the bones presented with shortness of breath gradually worsening over the past month. Shortness of breath was mostly exertion asociated with cough at rest and on exertion. Patient had no sick contacts, history of heart failure, fevers, chills, chest pain or abdominal pain. Patient has a history of prostate cancer diagnosed in , s/p RT on chemo for past seven moths. His cycles last 4 weeks (3 weeks of chemo and 1 week of rest - 1st week of chemo is Avastin, Docetaxel, and Carboplatin and the 2nd/3rd weeks are Docetaxel/Carboplatin only). He is currently on his 8th cycle of chemo; last dose  (did not receive Carboplatin then due to SOB). Of note, his wife usually gave him Neupogen 5 mg after chemo as per oncologist. Patient endorses 18 lb weight loss over the past 7 months. Of note, patient was found to be BRAF positive, and has a mother who  at a young age of liver cancer and a sister who has breast cancer improving with treatment.    In ED, patient was found to be in sinus tachycardia with  and in hypoxic respiratory distress with SpOO2 92% on room air. CTA showed partially loculated moderate sized bilateral pleural effusions, right more than left. Patient was also found to have diffuse osseous metastases.     Patient was admitted to medicine and placed on oxygen by nasal cannula. Chemotherapy was held and neupogen started per oncology recommendation. Patient underwent diagnostic/therapeutic thoracentesis by pulmonology and had significant improvement in his respiratory status. Pleural fluid was found to be exudative, likely metastatic. Patient still required oxygen on ambulation due to respiratory decompensation.     Patient is medically stable for discharge at this time with follow up with his oncologist and pulmonology for further management of metastatic prostate cancer with pulmonary complications. HPI:  66 yo M with pmhx of stage IV prostate cancer with metastasis to the bones presented with shortness of breath gradually worsening over the past month. Shortness of breath was mostly exertion asociated with cough at rest and on exertion. Patient had no sick contacts, history of heart failure, fevers, chills, chest pain or abdominal pain. Patient has a history of prostate cancer diagnosed in , s/p RT on chemo for past seven moths. His cycles last 4 weeks (3 weeks of chemo and 1 week of rest - 1st week of chemo is Avastin, Docetaxel, and Carboplatin and the 2nd/3rd weeks are Docetaxel/Carboplatin only). He is currently on his 8th cycle of chemo; last dose  (did not receive Carboplatin then due to SOB). Of note, his wife usually gave him Neupogen 5 mg after chemo as per oncologist. Patient endorses 18 lb weight loss over the past 7 months. Of note, patient was found to be BRAF positive, and has a mother who  at a young age of liver cancer and a sister who has breast cancer improving with treatment.    In ED, patient was found to be in sinus tachycardia with  and in hypoxic respiratory distress with SpOO2 92% on room air. CTA showed partially loculated moderate sized bilateral pleural effusions, right more than left. Patient was also found to have diffuse osseous metastases.     Patient was admitted to medicine and placed on oxygen by nasal cannula. Chemotherapy was held and neupogen started per oncology recommendation. Patient underwent diagnostic/therapeutic thoracentesis by pulmonology and had significant improvement in his respiratory status. Pleural fluid was found to be exudative, likely metastatic. Patient still required oxygen on ambulation due to respiratory decompensation. Home oxygen was arranged for the patient as he is likely going to require it long term given the metastatic nature of the disease.     Patient is medically stable for discharge at this time with follow up with his oncologist and pulmonology for further management of metastatic prostate cancer with pulmonary complications.

## 2017-08-04 NOTE — DISCHARGE NOTE ADULT - OTHER SIGNIFICANT FINDINGS
< from: CT Angio Chest w/ IV Cont (07.31.17 @ 18:48) >  IMPRESSION:  No pulmonary artery embolism.  Partially loculated moderate-sized bilateral pleural effusions, right   greater than left.  4.0 cm ascending thoracic aorta.  Diffuse osseous metastases.    UPPER ABDOMEN: 2 cm peripheral splenic hypodensity, likely representing a   simple cyst. Large partially visualized 8.3 cm left renal complex cyst   calcified peripheral mural nodule.  VESSELS: Adequate opacification of the pulmonary arterial tree. No   pulmonary artery embolism. Coronary artery calcifications. A large 4.0 cm   ascending thoracic aorta level of the right main pulmonary artery.

## 2017-08-04 NOTE — DISCHARGE NOTE ADULT - ADDITIONAL INSTRUCTIONS
Please follow up with pulmonology on Aug Please follow up with pulmonology on Aug 14 at 9 am with Dr. Chowdhury located at [address] [place] [address].   Please follow up with your oncologist within 1-2 weeks for further management of your prostate cancer.

## 2017-08-04 NOTE — PROGRESS NOTE ADULT - PROBLEM SELECTOR PLAN 2
in setting of metastatic disease +/- increasing pleural effusions. - With metastases to bone.  - PSA currently 57.74; per wife, last PSA in May was 50   - Pt's last chemo was on 7/26, which was the 3rd week in the 4th week cycle  -continue to hold dasatinib in acute setting as it can cause fluid retention and pleural effusions  - pt to follow up outpt with his oncologist

## 2017-08-04 NOTE — PROGRESS NOTE ADULT - PROBLEM SELECTOR PLAN 3
- With metastases to bone. Lesion in kidney may be related to prostate metastasis as well  - PSA currently 57.74; per wife, last PSA in May was 50  - Pt's last chemo was on 7/26, which was the 3rd week in the 4th week cycle and therefore should not need chemo this week if he is still inpatient.  -continue to hold dasatinib in acute setting as it can cause fluid retention and pleural effusions  -f/u PSA  -f/u with Hematology recs - In setting of malignancy and chemotherapy, 2/2 anemia of chronic disease  - Hb stable

## 2017-08-04 NOTE — DISCHARGE NOTE ADULT - CARE PLAN
Principal Discharge DX:	Shortness of breath  Goal:	Symptomatic improvement  Instructions for follow-up, activity and diet:	You were admitted for respiratory distress. You were found to have fluid in your lungs. You underwent a procedure to remove the fluid and had symptomatic improvement. There is still fluid on your left side of the lung that require monitoring and possibly removal if it increases in size. Please follow up with pulmonology in 1-2 weeks for monitoring of your respiratory status.  Secondary Diagnosis:	Prostate cancer  Instructions for follow-up, activity and diet:	Your prostate cancer seems to have spread to your bone and lungs. Your chemotherapy was held as it could contribute to development of fluid in your lungs causing the shortness of breath that brought you to the hospital. Please follow up with your oncologist and discuss with them further plans for treatment.   Additionally, please refrain from taking Vitamin E supplement till you discuss its use with your oncologist.  Secondary Diagnosis:	Anemia  Instructions for follow-up, activity and diet:	You were found to have anemia which is low levels of hemoglobin. This decreases your body's oxygen carrying capacity and possibly can contribute to shortness of breath further. This is likely secondary to prostate cancer. Please continue iron supplements with stool softeners. Principal Discharge DX:	Shortness of breath  Goal:	Symptomatic improvement  Instructions for follow-up, activity and diet:	You were admitted for respiratory distress. You were found to have fluid in your lungs. You underwent a procedure to remove the fluid and had symptomatic improvement. There is still fluid on your left side of the lung that require monitoring and possibly removal if it increases in size. Please follow up with pulmonology in 1-2 weeks for monitoring of your respiratory status.  IF your respiratory status worsens please call your pulmonologist.  Secondary Diagnosis:	Prostate cancer  Instructions for follow-up, activity and diet:	Your prostate cancer seems to have spread to your bone and lungs. Your chemotherapy was held as it could contribute to development of fluid in your lungs causing the shortness of breath that brought you to the hospital. Please follow up with your oncologist and discuss with them further plans for treatment.   Additionally, please refrain from taking Vitamin E supplement till you discuss its use with your oncologist.  Please follow up with your oncologist to discuss whehter to continue current chemotherapy regimen.  Secondary Diagnosis:	Anemia  Instructions for follow-up, activity and diet:	You were found to have anemia which is low levels of hemoglobin. This decreases your body's oxygen carrying capacity and possibly can contribute to shortness of breath further. This is likely secondary to prostate cancer. Please continue iron supplements with stool softeners.

## 2017-08-04 NOTE — DISCHARGE NOTE ADULT - CARE PROVIDER_API CALL
Marley Mishra  [phone]  Phone: (   )    -  Fax: (   )    - Marley Mishra  [phone]  Phone: (   )    -  Fax: (   )    -    Pete Chowdhury), Critical Care Medicine; Pulmonary Disease  [address] [id number]  Phone: [phone]  Fax: [phone]

## 2017-08-04 NOTE — DISCHARGE NOTE ADULT - PLAN OF CARE
Symptomatic improvement You were admitted for respiratory distress. You were found to have fluid in your lungs. You underwent a procedure to remove the fluid and had symptomatic improvement. There is still fluid on your left side of the lung that require monitoring and possibly removal if it increases in size. Please follow up with pulmonology in 1-2 weeks for monitoring of your respiratory status. Your prostate cancer seems to have spread to your bone and lungs. Your chemotherapy was held as it could contribute to development of fluid in your lungs causing the shortness of breath that brought you to the hospital. Please follow up with your oncologist and discuss with them further plans for treatment.   Additionally, please refrain from taking Vitamin E supplement till you discuss its use with your oncologist. You were found to have anemia which is low levels of hemoglobin. This decreases your body's oxygen carrying capacity and possibly can contribute to shortness of breath further. This is likely secondary to prostate cancer. Please continue iron supplements with stool softeners. You were admitted for respiratory distress. You were found to have fluid in your lungs. You underwent a procedure to remove the fluid and had symptomatic improvement. There is still fluid on your left side of the lung that require monitoring and possibly removal if it increases in size. Please follow up with pulmonology in 1-2 weeks for monitoring of your respiratory status.  IF your respiratory status worsens please call your pulmonologist. Your prostate cancer seems to have spread to your bone and lungs. Your chemotherapy was held as it could contribute to development of fluid in your lungs causing the shortness of breath that brought you to the hospital. Please follow up with your oncologist and discuss with them further plans for treatment.   Additionally, please refrain from taking Vitamin E supplement till you discuss its use with your oncologist.  Please follow up with your oncologist to discuss whehter to continue current chemotherapy regimen.

## 2017-08-04 NOTE — PROGRESS NOTE ADULT - PROBLEM SELECTOR PLAN 5
- DVT: Lovenox  - Diet: regular

## 2017-08-04 NOTE — PROGRESS NOTE ADULT - ATTENDING COMMENTS
67 year old male with metastatic prostate cancer on active chemo presents with worsening dyspnea in setting of bilateral pleural effusions. Differential includes iatrogenic (specifically, destinib, which is known to cause pleural effusions), malignancy-related, and cardiac.    -Plan for therapeutic
67 year old male with metastatic prostate cancer on active chemo presents with worsening dyspnea in setting of bilateral pleural effusions. Differential includes iatrogenic (specifically, destinib, which is known to cause pleural effusions), malignancy-related, and cardiac.    -Had right sided thoracentesis today  -Sent for fluid analysis, cytology  -Awaiting results
Agree with R1 note as above, modified as necessary.  Patient s/p therapeutic thoracentesis today on right side, symptomatically improved  -ambulate off of O2 to monitor O2.    -cough now improved s/p thoracentesis, discussed with family, deferring MBS  -if ambulates on RA, D/C planning for tomorrow.
Agree with R1 note as above with following addendum    6: Dysphagia  -patient with cough occasionally after eating.  Discusse diwth patiett and family option of modified barium swallow.  Per patient wants to eat what he likes.  At this time, will perform thoracenteis sfirst and see if cough improves, if not will do MBS if patient prefers.   Would not perform MBS if patient would not adhere to recommendations.
Agree with R1 note above    1. Hypoxic respiratory failure  -in setting of b/l loculated pleural effusion  -reviewed Echocardiogram, trace pericardial effusion, no evidence of tampanade  -pulm to attempt thoracentesis tomorrow.  If too loculated, will call CT surgery for possible VATS    2. Prostate CA  -neupogen for now  -hold chemotherapy agents.  -onc recs appreciated.
Agree with R1 above.  Discussed personally with pulmonology team and fellow, who spoke to Dr. Villarreal.  Patient stable for discharge from their standpoint, knowing desaturation on RA.  To have apopintment set up for patient.  Patient also had discussion with out Oncologist re: Chemo, will follow up with his own oncologist.   D/C planning 33 minutes.

## 2017-08-04 NOTE — PROGRESS NOTE ADULT - PROBLEM SELECTOR PLAN 4
- In setting of malignancy and chemotherapy, 2/2 anemia of chronic disease  -f/u with CBC - DVT: Lovenox  - Diet: regular      Amberly Ramos, R2    [phone] after 7pm and on weekends

## 2017-08-04 NOTE — DISCHARGE NOTE ADULT - PATIENT PORTAL LINK FT
“You can access the [facility] Patient Portal, offered by [facility], by registering with the following website: [email or web address]”

## 2017-08-04 NOTE — PROGRESS NOTE ADULT - ASSESSMENT
66 yo M with a PMH stage IV prostate cancer with metastasis to the bones, presenting with SOB found to have bilateral pleural effusion now s/p thoracentesis found to have exudative fluid.
66 yo M with a PMH stage IV prostate cancer with metastasis to the bones, presenting with SOB, found to have bilateral pleural effusions.
66 yo M with a PMH stage IV prostate cancer with metastasis to the bones, presenting with SOB, found to have bilateral pleural effusions.
68 yo M with a PMH stage IV prostate cancer with metastasis to the bones, presenting with SOB, found to have bilateral pleural effusions.

## 2017-08-08 LAB
CULTURE RESULTS: SIGNIFICANT CHANGE UP
SPECIMEN SOURCE: SIGNIFICANT CHANGE UP

## 2017-08-14 ENCOUNTER — APPOINTMENT (OUTPATIENT)
Dept: PULMONOLOGY | Facility: CLINIC | Age: 67
End: 2017-08-14

## 2017-09-02 LAB
CULTURE RESULTS: SIGNIFICANT CHANGE UP
SPECIMEN SOURCE: SIGNIFICANT CHANGE UP

## 2017-09-06 RX ORDER — BICALUTAMIDE 50 MG/1
1 TABLET, FILM COATED ORAL
Qty: 0 | Refills: 0 | COMMUNITY

## 2017-09-06 RX ORDER — DENOSUMAB 60 MG/ML
1 INJECTION SUBCUTANEOUS
Qty: 0 | Refills: 0 | COMMUNITY

## 2017-09-06 RX ORDER — DASATINIB 80 MG/1
1 TABLET ORAL
Qty: 0 | Refills: 0 | COMMUNITY

## 2017-09-06 RX ORDER — VITAMIN E 100 UNIT
1 CAPSULE ORAL
Qty: 0 | Refills: 0 | COMMUNITY

## 2017-09-06 RX ORDER — DRONABINOL 2.5 MG
1 CAPSULE ORAL
Qty: 0 | Refills: 0 | COMMUNITY

## 2017-09-06 RX ORDER — MESALAMINE 400 MG
1 TABLET, DELAYED RELEASE (ENTERIC COATED) ORAL
Qty: 0 | Refills: 0 | COMMUNITY

## 2017-09-06 RX ORDER — BICALUTAMIDE 50 MG/1
3 TABLET, FILM COATED ORAL
Qty: 0 | Refills: 0 | COMMUNITY

## 2017-09-06 RX ORDER — MULTIVIT-MIN/FERROUS GLUCONATE 9 MG/15 ML
1 LIQUID (ML) ORAL
Qty: 0 | Refills: 0 | COMMUNITY

## 2017-09-06 RX ORDER — GOSERELIN ACETATE 10.8 MG/1
0 IMPLANT SUBCUTANEOUS
Qty: 0 | Refills: 0 | COMMUNITY

## 2017-09-06 RX ORDER — L.ACIDOPH/B.ANIMALIS/B.LONGUM 15B CELL
1 CAPSULE ORAL
Qty: 0 | Refills: 0 | COMMUNITY

## 2017-09-06 RX ORDER — CHOLECALCIFEROL (VITAMIN D3) 125 MCG
1 CAPSULE ORAL
Qty: 0 | Refills: 0 | COMMUNITY

## 2017-09-23 LAB
CULTURE RESULTS: SIGNIFICANT CHANGE UP
SPECIMEN SOURCE: SIGNIFICANT CHANGE UP

## 2017-10-14 ENCOUNTER — RESULT REVIEW (OUTPATIENT)
Age: 67
End: 2017-10-14

## 2017-10-14 ENCOUNTER — INPATIENT (INPATIENT)
Facility: HOSPITAL | Age: 67
LOS: 6 days | Discharge: HOME | DRG: 167 | End: 2017-10-21
Attending: INTERNAL MEDICINE | Admitting: INTERNAL MEDICINE
Payer: MEDICARE

## 2017-10-14 VITALS
OXYGEN SATURATION: 92 % | TEMPERATURE: 98 F | DIASTOLIC BLOOD PRESSURE: 120 MMHG | SYSTOLIC BLOOD PRESSURE: 162 MMHG | RESPIRATION RATE: 22 BRPM | HEART RATE: 110 BPM

## 2017-10-14 DIAGNOSIS — R06.02 SHORTNESS OF BREATH: ICD-10-CM

## 2017-10-14 DIAGNOSIS — C61 MALIGNANT NEOPLASM OF PROSTATE: ICD-10-CM

## 2017-10-14 DIAGNOSIS — J90 PLEURAL EFFUSION, NOT ELSEWHERE CLASSIFIED: ICD-10-CM

## 2017-10-14 DIAGNOSIS — J94.8 OTHER SPECIFIED PLEURAL CONDITIONS: ICD-10-CM

## 2017-10-14 DIAGNOSIS — D64.9 ANEMIA, UNSPECIFIED: ICD-10-CM

## 2017-10-14 LAB
ALBUMIN FLD-MCNC: 2.8 G/DL — SIGNIFICANT CHANGE UP
ALBUMIN SERPL ELPH-MCNC: 3.4 G/DL — SIGNIFICANT CHANGE UP (ref 3.3–5)
ALP SERPL-CCNC: 50 U/L — SIGNIFICANT CHANGE UP (ref 40–120)
ALT FLD-CCNC: 9 U/L RC — LOW (ref 10–45)
ANION GAP SERPL CALC-SCNC: 14 MMOL/L — SIGNIFICANT CHANGE UP (ref 5–17)
ANION GAP SERPL CALC-SCNC: 9 MMOL/L — SIGNIFICANT CHANGE UP (ref 5–17)
APTT BLD: 29.5 SEC — SIGNIFICANT CHANGE UP (ref 27.5–37.4)
AST SERPL-CCNC: 26 U/L — SIGNIFICANT CHANGE UP (ref 10–40)
B PERT IGG+IGM PNL SER: ABNORMAL
BASOPHILS # BLD AUTO: 0.1 K/UL — SIGNIFICANT CHANGE UP (ref 0–0.2)
BASOPHILS NFR BLD AUTO: 1.5 % — SIGNIFICANT CHANGE UP (ref 0–2)
BILIRUB SERPL-MCNC: 0.4 MG/DL — SIGNIFICANT CHANGE UP (ref 0.2–1.2)
BUN SERPL-MCNC: 10 MG/DL — SIGNIFICANT CHANGE UP (ref 7–23)
BUN SERPL-MCNC: 7 MG/DL — SIGNIFICANT CHANGE UP (ref 7–23)
CALCIUM SERPL-MCNC: 8.3 MG/DL — LOW (ref 8.4–10.5)
CALCIUM SERPL-MCNC: 8.7 MG/DL — SIGNIFICANT CHANGE UP (ref 8.4–10.5)
CHLORIDE SERPL-SCNC: 103 MMOL/L — SIGNIFICANT CHANGE UP (ref 96–108)
CHLORIDE SERPL-SCNC: 99 MMOL/L — SIGNIFICANT CHANGE UP (ref 96–108)
CHOLEST FLD-MCNC: 92 MG/DL — SIGNIFICANT CHANGE UP
CK MB BLD-MCNC: 2.4 % — SIGNIFICANT CHANGE UP (ref 0–3.5)
CK MB CFR SERPL CALC: 2 NG/ML — SIGNIFICANT CHANGE UP (ref 0–6.7)
CK MB CFR SERPL CALC: 2.1 NG/ML — SIGNIFICANT CHANGE UP (ref 0–6.7)
CK SERPL-CCNC: 88 U/L — SIGNIFICANT CHANGE UP (ref 30–200)
CK SERPL-CCNC: 93 U/L — SIGNIFICANT CHANGE UP (ref 30–200)
CO2 SERPL-SCNC: 26 MMOL/L — SIGNIFICANT CHANGE UP (ref 22–31)
CO2 SERPL-SCNC: 26 MMOL/L — SIGNIFICANT CHANGE UP (ref 22–31)
COLOR FLD: YELLOW — SIGNIFICANT CHANGE UP
CREAT SERPL-MCNC: 0.54 MG/DL — SIGNIFICANT CHANGE UP (ref 0.5–1.3)
CREAT SERPL-MCNC: 0.63 MG/DL — SIGNIFICANT CHANGE UP (ref 0.5–1.3)
D DIMER BLD IA.RAPID-MCNC: 3395 NG/ML DDU — HIGH
EOSINOPHIL # BLD AUTO: 0 K/UL — SIGNIFICANT CHANGE UP (ref 0–0.5)
EOSINOPHIL # FLD: 10 % — SIGNIFICANT CHANGE UP
EOSINOPHIL NFR BLD AUTO: 0.1 % — SIGNIFICANT CHANGE UP (ref 0–6)
FLUID INTAKE SUBSTANCE CLASS: SIGNIFICANT CHANGE UP
FLUID SEGMENTED GRANULOCYTES: 40 % — SIGNIFICANT CHANGE UP
GAS PNL BLDV: SIGNIFICANT CHANGE UP
GLUCOSE FLD-MCNC: 106 MG/DL — SIGNIFICANT CHANGE UP
GLUCOSE SERPL-MCNC: 136 MG/DL — HIGH (ref 70–99)
GLUCOSE SERPL-MCNC: 88 MG/DL — SIGNIFICANT CHANGE UP (ref 70–99)
GRAM STN FLD: SIGNIFICANT CHANGE UP
HCT VFR BLD CALC: 35.1 % — LOW (ref 39–50)
HCT VFR BLD CALC: 35.6 % — LOW (ref 39–50)
HGB BLD-MCNC: 11.8 G/DL — LOW (ref 13–17)
HGB BLD-MCNC: 12.1 G/DL — LOW (ref 13–17)
INR BLD: 1.15 RATIO — SIGNIFICANT CHANGE UP (ref 0.88–1.16)
LDH SERPL L TO P-CCNC: 235 U/L — SIGNIFICANT CHANGE UP
LYMPHOCYTES # BLD AUTO: 1.3 K/UL — SIGNIFICANT CHANGE UP (ref 1–3.3)
LYMPHOCYTES # BLD AUTO: 31.2 % — SIGNIFICANT CHANGE UP (ref 13–44)
LYMPHOCYTES # FLD: 20 % — SIGNIFICANT CHANGE UP
MCHC RBC-ENTMCNC: 29.9 PG — SIGNIFICANT CHANGE UP (ref 27–34)
MCHC RBC-ENTMCNC: 30 PG — SIGNIFICANT CHANGE UP (ref 27–34)
MCHC RBC-ENTMCNC: 33.6 GM/DL — SIGNIFICANT CHANGE UP (ref 32–36)
MCHC RBC-ENTMCNC: 33.9 GM/DL — SIGNIFICANT CHANGE UP (ref 32–36)
MCV RBC AUTO: 88.5 FL — SIGNIFICANT CHANGE UP (ref 80–100)
MCV RBC AUTO: 89.1 FL — SIGNIFICANT CHANGE UP (ref 80–100)
MESOTHL CELL # FLD: 10 % — SIGNIFICANT CHANGE UP
MONOCYTES # BLD AUTO: 0.5 K/UL — SIGNIFICANT CHANGE UP (ref 0–0.9)
MONOCYTES NFR BLD AUTO: 12.6 % — SIGNIFICANT CHANGE UP (ref 2–14)
MONOS+MACROS # FLD: 20 % — SIGNIFICANT CHANGE UP
NEUTROPHILS # BLD AUTO: 2.3 K/UL — SIGNIFICANT CHANGE UP (ref 1.8–7.4)
NEUTROPHILS NFR BLD AUTO: 54.7 % — SIGNIFICANT CHANGE UP (ref 43–77)
NT-PROBNP SERPL-SCNC: 324 PG/ML — HIGH (ref 0–300)
PH FLD: 7.66 — SIGNIFICANT CHANGE UP
PLATELET # BLD AUTO: 291 K/UL — SIGNIFICANT CHANGE UP (ref 150–400)
PLATELET # BLD AUTO: 330 K/UL — SIGNIFICANT CHANGE UP (ref 150–400)
POTASSIUM SERPL-MCNC: 3.5 MMOL/L — SIGNIFICANT CHANGE UP (ref 3.5–5.3)
POTASSIUM SERPL-MCNC: 4.3 MMOL/L — SIGNIFICANT CHANGE UP (ref 3.5–5.3)
POTASSIUM SERPL-SCNC: 3.5 MMOL/L — SIGNIFICANT CHANGE UP (ref 3.5–5.3)
POTASSIUM SERPL-SCNC: 4.3 MMOL/L — SIGNIFICANT CHANGE UP (ref 3.5–5.3)
PROT FLD-MCNC: 4.4 G/DL — SIGNIFICANT CHANGE UP
PROT SERPL-MCNC: 6.6 G/DL — SIGNIFICANT CHANGE UP (ref 6–8.3)
PROTHROM AB SERPL-ACNC: 12.6 SEC — SIGNIFICANT CHANGE UP (ref 9.8–12.7)
RBC # BLD: 3.94 M/UL — LOW (ref 4.2–5.8)
RBC # BLD: 4.02 M/UL — LOW (ref 4.2–5.8)
RBC # FLD: 16.6 % — HIGH (ref 10.3–14.5)
RBC # FLD: 16.9 % — HIGH (ref 10.3–14.5)
RCV VOL RI: 2080 /UL — HIGH (ref 0–5)
SODIUM SERPL-SCNC: 138 MMOL/L — SIGNIFICANT CHANGE UP (ref 135–145)
SODIUM SERPL-SCNC: 139 MMOL/L — SIGNIFICANT CHANGE UP (ref 135–145)
SPECIMEN SOURCE: SIGNIFICANT CHANGE UP
TOTAL NUCLEATED CELL COUNT, BODY FLUID: 600 /UL — HIGH (ref 0–5)
TRIGL FLD-MCNC: 31 MG/DL — SIGNIFICANT CHANGE UP
TROPONIN T SERPL-MCNC: <0.01 NG/ML — SIGNIFICANT CHANGE UP (ref 0–0.06)
TROPONIN T SERPL-MCNC: <0.01 NG/ML — SIGNIFICANT CHANGE UP (ref 0–0.06)
TUBE TYPE: SIGNIFICANT CHANGE UP
WBC # BLD: 4.2 K/UL — SIGNIFICANT CHANGE UP (ref 3.8–10.5)
WBC # BLD: 5.6 K/UL — SIGNIFICANT CHANGE UP (ref 3.8–10.5)
WBC # FLD AUTO: 4.2 K/UL — SIGNIFICANT CHANGE UP (ref 3.8–10.5)
WBC # FLD AUTO: 5.6 K/UL — SIGNIFICANT CHANGE UP (ref 3.8–10.5)

## 2017-10-14 PROCEDURE — 88341 IMHCHEM/IMCYTCHM EA ADD ANTB: CPT | Mod: 26

## 2017-10-14 PROCEDURE — 71250 CT THORAX DX C-: CPT | Mod: 26

## 2017-10-14 PROCEDURE — 32551 INSERTION OF CHEST TUBE: CPT | Mod: 59,GC

## 2017-10-14 PROCEDURE — 88112 CYTOPATH CELL ENHANCE TECH: CPT | Mod: 26

## 2017-10-14 PROCEDURE — 88342 IMHCHEM/IMCYTCHM 1ST ANTB: CPT | Mod: 26

## 2017-10-14 PROCEDURE — 32555 ASPIRATE PLEURA W/ IMAGING: CPT | Mod: GC

## 2017-10-14 PROCEDURE — 99223 1ST HOSP IP/OBS HIGH 75: CPT | Mod: 25,GC

## 2017-10-14 PROCEDURE — 71010: CPT | Mod: 26

## 2017-10-14 PROCEDURE — 93010 ELECTROCARDIOGRAM REPORT: CPT

## 2017-10-14 PROCEDURE — 88305 TISSUE EXAM BY PATHOLOGIST: CPT | Mod: 26

## 2017-10-14 PROCEDURE — 99285 EMERGENCY DEPT VISIT HI MDM: CPT | Mod: 25,GC

## 2017-10-14 RX ORDER — BICALUTAMIDE 50 MG/1
50 TABLET, FILM COATED ORAL DAILY
Qty: 0 | Refills: 0 | Status: DISCONTINUED | OUTPATIENT
Start: 2017-10-14 | End: 2017-10-14

## 2017-10-14 RX ORDER — BICALUTAMIDE 50 MG/1
150 TABLET, FILM COATED ORAL DAILY
Qty: 0 | Refills: 0 | Status: DISCONTINUED | OUTPATIENT
Start: 2017-10-14 | End: 2017-10-17

## 2017-10-14 RX ORDER — CHOLECALCIFEROL (VITAMIN D3) 125 MCG
1000 CAPSULE ORAL DAILY
Qty: 0 | Refills: 0 | Status: DISCONTINUED | OUTPATIENT
Start: 2017-10-14 | End: 2017-10-17

## 2017-10-14 RX ORDER — INFLUENZA VIRUS VACCINE 15; 15; 15; 15 UG/.5ML; UG/.5ML; UG/.5ML; UG/.5ML
0.5 SUSPENSION INTRAMUSCULAR ONCE
Qty: 0 | Refills: 0 | Status: DISCONTINUED | OUTPATIENT
Start: 2017-10-14 | End: 2017-10-21

## 2017-10-14 RX ORDER — DRONABINOL 2.5 MG
2.5 CAPSULE ORAL
Qty: 0 | Refills: 0 | Status: DISCONTINUED | OUTPATIENT
Start: 2017-10-14 | End: 2017-10-17

## 2017-10-14 RX ORDER — HEPARIN SODIUM 5000 [USP'U]/ML
5000 INJECTION INTRAVENOUS; SUBCUTANEOUS EVERY 8 HOURS
Qty: 0 | Refills: 0 | Status: DISCONTINUED | OUTPATIENT
Start: 2017-10-14 | End: 2017-10-17

## 2017-10-14 RX ADMIN — Medication 2.5 MILLIGRAM(S): at 18:13

## 2017-10-14 RX ADMIN — Medication 1000 UNIT(S): at 18:13

## 2017-10-14 RX ADMIN — BICALUTAMIDE 150 MILLIGRAM(S): 50 TABLET, FILM COATED ORAL at 18:13

## 2017-10-14 RX ADMIN — HEPARIN SODIUM 5000 UNIT(S): 5000 INJECTION INTRAVENOUS; SUBCUTANEOUS at 21:26

## 2017-10-14 NOTE — H&P ADULT - ASSESSMENT
pt with above history p/w shortness of breath found to have left loculated hydropneumo and rt loculated effusion

## 2017-10-14 NOTE — CONSULT NOTE ADULT - ASSESSMENT
66 y/o M with history of metastatic prostate cancer with bilateral pleural effusion, now presents with worsening shortness of breath with left hydropneumothorax.

## 2017-10-14 NOTE — CONSULT NOTE ADULT - SUBJECTIVE AND OBJECTIVE BOX
67 M with history of metastatic prostate cancer to bone presents with worsening dyspnea over the last 2-3 days Dyspnea worse with exertion when walking up flight of stairs. Walking slowly of flat road does not cause any symptoms. Has an associated non productive cough. No chest pain, chills, fevers, hemoptysis, or syncope. The patient was here for similar complains in August and was found to have bilateral pleural effusions, right > left. He underwent a diagnostic and therapeutic thoracentesis of the right side at that time. He was diagnosed with 2012 and underwent XRT in 2013. He is now on chemotherapy previously on avastin, carboplatin/taxotere with dasatinib, now on taxotere and avastin. Chest imaging showed a left hydropneumothorax, which was not present on previous imaging in August. A pulmonary consult was requested for further evaluation and management by the ER.    PAST MEDICAL & SURGICAL HISTORY:  Anemia  Prostate CA: stage IV with metastasis to bone  No significant past surgical history    FAMILY HISTORY:  Family history of breast cancer (Sibling)  Family history of liver cancer (Mother): age 40s    SOCIAL HISTORY:  Smoking: [X] Never Smoked  Substance Use: [X] Never Used [ ] Used ____  EtOH Use: Former use, occasional  Marital Status: [ ] Single [x]  [ ]  [ ]     Allergies    No Known Allergies        REVIEW OF SYSTEMS:  Constitutional: [ ] negative [-] fevers [-] chills [ ] weight loss [ ] weight gain  HEENT: [ ] negative [ ] dry eyes [ ] eye irritation [ ] postnasal drip [ ] nasal congestion  CV: [ ] negative  [-] chest pain [-] orthopnea [-] palpitations [ ] murmur  Resp: [ ] negative [-] cough [+] shortness of breath [+] dyspnea [-] wheezing [-] sputum [ ] hemoptysis  GI: [ ] negative [-] nausea [-] vomiting [-] diarrhea [-] constipation [-] abd pain [ ] dysphagia   : [ ] negative [-] dysuria [ ] nocturia [ ] hematuria [ ] increased urinary frequency  Musculoskeletal: [ ] negative [ ] back pain [ ] myalgias [ ] arthralgias [ ] fracture  Skin: [-] negative [ ] rash [ ] itch  Neurological: [-] negative [ ] headache [ ] dizziness [ ] syncope [ ] weakness [ ] numbness  [-] All other systems negative      OBJECTIVE:  ICU Vital Signs Last 24 Hrs  T(C): 36.6 (14 Oct 2017 14:45), Max: 36.9 (14 Oct 2017 12:05)  T(F): 97.8 (14 Oct 2017 14:45), Max: 98.5 (14 Oct 2017 12:05)  HR: 102 (14 Oct 2017 14:45) (98 - 113)  BP: 154/109 (14 Oct 2017 14:45) (132/87 - 162/120)  BP(mean): --  ABP: --  ABP(mean): --  RR: 22 (14 Oct 2017 14:45) (22 - 28)  SpO2: 100% (14 Oct 2017 14:45) (91% - 100%)      CAPILLARY BLOOD GLUCOSE    PHYSICAL EXAM:  General: Awake, alert, oriented X 3.   HEENT: Atraumatic, normocephalic.               Mallampatti stGstrstastdstest:st st1st No nasal congestion.              No tonsillar or pharyngeal exudates.  Lymph Nodes: No palpable lymphadenopathy  Neck: No JVD. No carotid bruit.   Respiratory: Reduced breath sounds bilaterally, more prominent at bases.   Cardiovascular: S1 S2 normal. No murmurs, rubs or gallops.   Abdomen: Soft, non-tender, non-distended. No organomegaly.  Extremities: Warm to touch. Peripheral pulse palpable. No pedal edema.   Skin: No rashes or skin lesions  Neurological: Motor and sensory examination equal and normal in all four extremities.  Psychiatry: Appropriate mood and affect.    LABS:                        11.8   4.2   )-----------( 291      ( 14 Oct 2017 09:39 )             35.1     10-14    139  |  99  |  7   ----------------------------<  88  3.5   |  26  |  0.54    Ca    8.3<L>      14 Oct 2017 09:39    TPro  6.6  /  Alb  3.4  /  TBili  0.4  /  DBili  x   /  AST  26  /  ALT  9<L>  /  AlkPhos  50  10-14    PT/INR - ( 14 Oct 2017 09:39 )   PT: 12.6 sec;   INR: 1.15 ratio         PTT - ( 14 Oct 2017 09:39 )  PTT:29.5 sec      Venous Blood Gas:  10-14 @ 09:39  7.44/39/34/26/56  VB Lactate: 1.8    RADIOLOGY:  [X] Reviewed and interpreted by me. CT Chest showing right pleural effusion. Left sided hydropneumothorax is new compared to previous CT.     Point of Care Ultrasound Findings; Left hydropneumothorax. Right pleural effusion.

## 2017-10-14 NOTE — H&P ADULT - HISTORY OF PRESENT ILLNESS
67 M with history of metastatic prostate cancer to bone presents with worsening dyspnea over the last 2-3 days Dyspnea worse with exertion when walking up flight of stairs. Walking slowly of flat road does not cause any symptoms. Has an associated non productive cough. No chest pain, chills, fevers, hemoptysis, or syncope. The patient was here for similar complains in August and was found to have bilateral pleural effusions, right > left. He underwent a diagnostic and therapeutic thoracentesis of the right side at that time. He was diagnosed with 2012 and underwent XRT in 2013. He is now on chemotherapy previously on avastin, carboplatin/taxotere with dasatinib, now on taxotere and avastin. Chest imaging showed a left hydropneumothorax, which was not present on previous imaging in August. A pulmonary consult was requested for further evaluation and management by the ER.    as per pts wife, pt been having cough/ shortness of breath past few weeks after 2nd pleural tap , flew from [place] 2 days ago    pt was evaluated by pulmonary / Thoracic surgery  chest tube was placed by Pulm

## 2017-10-14 NOTE — ED PROVIDER NOTE - PHYSICAL EXAMINATION
Distress: none  Mentation: AAO x 3  Mood: Appropriate  ENT: airway patent  Eyes: conjunctivae clear bilaterally  Cardio: RRR, no m/r/g  Resp: decreased sounds at bases, mild crackles on right base   GI: s/nt/nd   Neuro: AAO x 3, sensation and motor function intact, CN 2-12 intact  Skin: No evidence of rash  MSK: normal movement of all extremities Distress: none  Mentation: AAO x 3, mild distress  Mood: Appropriate  ENT: airway patent  Eyes: conjunctivae clear bilaterally  Cardio: tachycardic, no m/r/g  Resp: decreased sounds at bases, mild crackles on right base   GI: s/nt/nd   Neuro: AAO x 3, sensation and motor function intact, CN 2-12 intact  Skin: No evidence of rash  MSK: normal movement of all extremities

## 2017-10-14 NOTE — ED PROVIDER NOTE - NS ED ROS FT
Constitutional: no fever  Eyes: no conjunctivitis  Ears: no ear pain   Nose: no nasal congestion, Mouth/Throat: no throat pain, Neck: no stiffness  Cardiovascular: no chest pain  Chest: + cough  Gastrointestinal: no abdominal pain, no vomiting and diarrhea  MSK: no joint pain  : no dysuria  Skin: no rash  Neuro: no LOC

## 2017-10-14 NOTE — PROCEDURE NOTE - PROCEDURE
<<-----Click on this checkbox to enter Procedure Chest tube placement with US guidance  10/14/2017  Hydropneumothorax on Left  Active  [email or web address]

## 2017-10-14 NOTE — CHART NOTE - NSCHARTNOTEFT_GEN_A_CORE
Called by Thoracic Surgery to evaluate L hydropneumothorax. However, Pulmonary has already placed a chest tube. Management as per Carlos Rodriguez

## 2017-10-14 NOTE — ED ADULT NURSE NOTE - OBJECTIVE STATEMENT
0845 67 yr old WM brought to Er by wife for s/o increasing SOB. Hx of Prostate cancer. Chemo last wk. Has powerport right chest with needle and dressing intact at r chest without redness or swelling. Was flushed at home this morning by wife. Pt A&Ox3. clor pale. skin W&D. Breath sounds decreased throughout R>L. Thoracentesis was done 3 months ago

## 2017-10-14 NOTE — PATIENT PROFILE ADULT. - PATIENT REPRESENTATIVE: ( YOU CAN CHOOSE ANY PERSON THAT CAN ASSIST YOU WITH YOUR HEALTH CARE PREFERENCES, DOES NOT HAVE TO BE A SPOUSE, IMMEDIATE FAMILY OR SIGNIFICANT OTHER/PARTNER)
Is This A New Presentation, Or A Follow-Up?: Growth How Severe Is Your Skin Lesion?: mild Has Your Skin Lesion Been Treated?: not been treated Yes

## 2017-10-14 NOTE — H&P ADULT - PROBLEM SELECTOR PLAN 1
chest tube to suction  pulm f/u   f/u cytology chest tube to suction  pulm f/u   f/u cytology  elevated Ddimer - pt and pts wife refused contrast even after multiple extensive discussion about the need for iv contrast - will obtain doppler lower ext

## 2017-10-14 NOTE — CONSULT NOTE ADULT - PROBLEM SELECTOR RECOMMENDATION 3
- Continue management as per primary team and oncology. - Continue management as per primary team and oncology.    Alton Coyle MD  Fellow,  Pulmonary & Critical Care Medicine   Weekend Spectra: 7am-5pm: [id number]  After hours: [phone]

## 2017-10-14 NOTE — ED PROVIDER NOTE - OBJECTIVE STATEMENT
66 yo male with active prostate ca with metastasis to the bone presenting with shortness of breath associated with cough that got acutely worse yesterday.  last chemo was last monday on toxotire.  worse with exertion, improved with rest.  had similar episode in july and was admitted.  last time there was too much fluid in the lungs.  no recent change.  came from [place] thursday.  on home oxygen, 2 L.  did not help with the shortness of breath.  no sick contacts.  on chemo since february.  last thora, 1.75 L was removed in july, has received two in the past.        pcp- aura diaz  onc- in [place] 66 yo male with active prostate ca with metastasis to the bone presenting with shortness of breath associated with cough that got acutely worse yesterday.  last chemo was last monday on toxotire.  worse with exertion, improved with rest.  had similar episode in july and was admitted.  last time there was too much fluid in the lungs.  no recent change.  came from [place] thursday.  on home oxygen, 2 L.  did not help with the shortness of breath.  no sick contacts.  on chemo since february.  last thoracentesis 1.75 L was removed in july, has received two in the past.        pcp- aura diaz  onc- in [place]

## 2017-10-14 NOTE — ED PROVIDER NOTE - MEDICAL DECISION MAKING DETAILS
68 yo male with sob and cough; likely fluid collection secondary to ca; will obtain cardiac enzymes labs bnp vbg cbc cmp to rule out cardiac etiology; likely admit for thora 66 yo male with sob and cough; likely fluid collection secondary to ca; will obtain cardiac enzymes labs bnp vbg cbc cmp to rule out cardiac etiology; likely admit for thoracentesis.   Michael: Patient with cancer with history of pleural effusions and thoracentesis, recent flight from [place] here for worsening sob. on home O2 2l nc at home.  wosre with exertion. will get labs, ekg, cxr, cta r/o pe, bedside u/s, reassess, likely admit. no acute distress here, saturating well on O2.

## 2017-10-14 NOTE — PROCEDURE NOTE - NSPROCDETAILS_GEN_ALL_CORE
dressing applied/secured in place/ultrasound assessment of fluid (location)/ultrasound assessment of fluid (size)/Seldinger technique

## 2017-10-14 NOTE — ED PROVIDER NOTE - PROGRESS NOTE DETAILS
spoke to patient and wife at length about cta, despite understanding the risks and harms of not getting study, they would not like ct with contrast.  they are accepting the risks associated with no contrast including missing a PE however she would like study with no contrast.  -nisa Risks, benefits , alternatives discussed with patient and wife about CTA chest to rule out pulmonary embolism.  Patient declines the test is aware of the potential consequences. CT results given verbally to me in regards to left hydropneumothorax. HR improved to 98. patient placed on nonrebreather 100%O2. Pulmonary consulted for emergent consult.

## 2017-10-15 LAB
BLD GP AB SCN SERPL QL: NEGATIVE — SIGNIFICANT CHANGE UP
HCT VFR BLD CALC: 32.5 % — LOW (ref 39–50)
HGB BLD-MCNC: 11 G/DL — LOW (ref 13–17)
MCHC RBC-ENTMCNC: 30.4 PG — SIGNIFICANT CHANGE UP (ref 27–34)
MCHC RBC-ENTMCNC: 34 GM/DL — SIGNIFICANT CHANGE UP (ref 32–36)
MCV RBC AUTO: 89.5 FL — SIGNIFICANT CHANGE UP (ref 80–100)
NIGHT BLUE STAIN TISS: SIGNIFICANT CHANGE UP
PLATELET # BLD AUTO: 276 K/UL — SIGNIFICANT CHANGE UP (ref 150–400)
RBC # BLD: 3.63 M/UL — LOW (ref 4.2–5.8)
RBC # FLD: 16.7 % — HIGH (ref 10.3–14.5)
RH IG SCN BLD-IMP: POSITIVE — SIGNIFICANT CHANGE UP
SPECIMEN SOURCE: SIGNIFICANT CHANGE UP
WBC # BLD: 4.7 K/UL — SIGNIFICANT CHANGE UP (ref 3.8–10.5)
WBC # FLD AUTO: 4.7 K/UL — SIGNIFICANT CHANGE UP (ref 3.8–10.5)

## 2017-10-15 PROCEDURE — 93970 EXTREMITY STUDY: CPT | Mod: 26

## 2017-10-15 PROCEDURE — 93308 TTE F-UP OR LMTD: CPT | Mod: 26

## 2017-10-15 PROCEDURE — 99233 SBSQ HOSP IP/OBS HIGH 50: CPT | Mod: GC

## 2017-10-15 PROCEDURE — 99223 1ST HOSP IP/OBS HIGH 75: CPT

## 2017-10-15 PROCEDURE — 71275 CT ANGIOGRAPHY CHEST: CPT | Mod: 26

## 2017-10-15 RX ORDER — CHLORHEXIDINE GLUCONATE 213 G/1000ML
30 SOLUTION TOPICAL ONCE
Qty: 0 | Refills: 0 | Status: DISCONTINUED | OUTPATIENT
Start: 2017-10-15 | End: 2017-10-17

## 2017-10-15 RX ORDER — CEFUROXIME AXETIL 250 MG
1500 TABLET ORAL ONCE
Qty: 0 | Refills: 0 | Status: DISCONTINUED | OUTPATIENT
Start: 2017-10-15 | End: 2017-10-17

## 2017-10-15 RX ADMIN — Medication 2.5 MILLIGRAM(S): at 05:02

## 2017-10-15 RX ADMIN — BICALUTAMIDE 150 MILLIGRAM(S): 50 TABLET, FILM COATED ORAL at 13:31

## 2017-10-15 RX ADMIN — HEPARIN SODIUM 5000 UNIT(S): 5000 INJECTION INTRAVENOUS; SUBCUTANEOUS at 21:04

## 2017-10-15 RX ADMIN — HEPARIN SODIUM 5000 UNIT(S): 5000 INJECTION INTRAVENOUS; SUBCUTANEOUS at 05:02

## 2017-10-15 RX ADMIN — Medication 2.5 MILLIGRAM(S): at 18:02

## 2017-10-15 RX ADMIN — Medication 1000 UNIT(S): at 13:32

## 2017-10-15 RX ADMIN — HEPARIN SODIUM 5000 UNIT(S): 5000 INJECTION INTRAVENOUS; SUBCUTANEOUS at 13:34

## 2017-10-15 NOTE — CONSULT NOTE ADULT - SUBJECTIVE AND OBJECTIVE BOX
CHIEF COMPLAINT: Patient is a 67y old  Male who presents with a chief complaint of sob (14 Oct 2017 12:27)      HPI:  67 M with history of metastatic prostate cancer to bone presents with worsening dyspnea over the last 2-3 days Dyspnea worse with exertion when walking up flight of stairs. Walking slowly of flat road does not cause any symptoms. Has an associated non productive cough. No chest pain, chills, fevers, hemoptysis, or syncope. The patient was here for similar complains in August and was found to have bilateral pleural effusions, right > left. He underwent a diagnostic and therapeutic thoracentesis of the right side at that time. He was diagnosed with 2012 and underwent XRT in 2013. He is now on chemotherapy previously on avastin, carboplatin/taxotere with dasatinib, now on taxotere and avastin. Chest imaging showed a left hydropneumothorax, which was not present on previous imaging in August. A pulmonary consult was requested for further evaluation and management by the ER.    as per pts wife, pt been having cough/ shortness of breath past few weeks after 2nd pleural tap , flew from [place] 2 days ago     chest tube was placed by Pulm (14 Oct 2017 12:27)    Pt currently complaining of pain at site of chest tube. Denies any  PND, orthopnea, near syncope, syncope, lower extremity edema, stroke like symptoms.     Apparently sees a cardiologist on [facility] (cannot remember name) and said had a negative workup in past.     EKG:   < from: 12 Lead ECG (10.14.17 @ 08:51) >  Diagnosis Line SINUS TACHYCARDIA  LOW VOLTAGE QRS  INFERIOR INFARCT , AGE UNDETERMINED  ABNORMAL ECG    < end of copied text >    REVIEW OF SYSTEMS:   All other review of systems are negative unless indicated above    PAST MEDICAL & SURGICAL HISTORY:  Anemia  Prostate CA: stage IV with metastasis to bone  No significant past surgical history      SOCIAL HISTORY:  No tobacco, ethanol, or drug abuse.    FAMILY HISTORY:  Family history of breast cancer (Sibling)  Family history of liver cancer: age 40s    No family history of acute MI or sudden cardiac death.    Home Medications:  bicalutamide 50 mg oral tablet: 3 tab(s) orally once a day (06 Sep 2017 22:09)  Calcium 600+D oral tablet: 2 tab(s) orally once a day (06 Sep 2017 22:09)  Centrum Men&#x27;s: 1 tab(s) orally once a day (06 Sep 2017 22:09)  dronabinol 2.5 mg oral capsule: 1 cap(s) orally 2 times a day (06 Sep 2017 22:09)  iron tablet: 1 tab(s) orally once a day (06 Sep 2017 22:09)  Probiotic Formula oral capsule: 1 cap(s) orally once a day (06 Sep 2017 22:09)  Vitamin D3 2000 intl units oral tablet: 1 tab(s) orally once a day (06 Sep 2017 22:09)  Xgeva 120 mg/1.7 mL subcutaneous solution: 1 dose(s) subcutaneous once a month (06 Sep 2017 22:09)    MEDICATIONS  (STANDING):  bicalutamide 150 milliGRAM(s) Oral daily  cholecalciferol 1000 Unit(s) Oral daily  dronabinol 2.5 milliGRAM(s) Oral two times a day  heparin  Injectable 5000 Unit(s) SubCutaneous every 8 hours  influenza   Vaccine 0.5 milliLiter(s) IntraMuscular once    MEDICATIONS  (PRN):      Allergies    No Known Allergies    Intolerances            VITAL SIGNS:   Vital Signs Last 24 Hrs  T(C): 37.2 (15 Oct 2017 04:11), Max: 37.7 (14 Oct 2017 20:56)  T(F): 98.9 (15 Oct 2017 04:11), Max: 99.9 (14 Oct 2017 20:56)  HR: 96 (15 Oct 2017 04:11) (96 - 113)  BP: 110/75 (15 Oct 2017 04:11) (110/75 - 162/120)  BP(mean): --  RR: 20 (15 Oct 2017 04:11) (20 - 28)  SpO2: 93% (15 Oct 2017 04:11) (91% - 100%)    I&O's Summary    14 Oct 2017 07:01  -  15 Oct 2017 07:00  --------------------------------------------------------  IN: 200 mL / OUT: 1800 mL / NET: -1600 mL        On Exam:  TELE: -115  Constitutional: NAD, awake and alert, well-developed  HEENT: Dry Mucous Membranes, Anicteric  Pulmonary: Decreased breath sounds b/l. L>R  Cardiovascular: Regular, S1 and S2, No murmurs, rubs, gallops or clicks  Gastrointestinal: Bowel Sounds present, soft, nontender.   Lymph: No peripheral edema. No lymphadenopathy.  Skin: No visible rashes or ulcers.  Psych:  Mood & affect appropriate    LABS: All Labs Reviewed:                        11.0   4.7   )-----------( 276      ( 15 Oct 2017 05:43 )             32.5                         12.1   5.6   )-----------( 330      ( 14 Oct 2017 18:28 )             35.6                         11.8   4.2   )-----------( 291      ( 14 Oct 2017 09:39 )             35.1     14 Oct 2017 18:28    138    |  103    |  10     ----------------------------<  136    4.3     |  26     |  0.63   14 Oct 2017 09:39    139    |  99     |  7      ----------------------------<  88     3.5     |  26     |  0.54     Ca    8.7        14 Oct 2017 18:28  Ca    8.3        14 Oct 2017 09:39    TPro  6.6    /  Alb  3.4    /  TBili  0.4    /  DBili  x      /  AST  26     /  ALT  9      /  AlkPhos  50     14 Oct 2017 09:39    PT/INR - ( 14 Oct 2017 09:39 )   PT: 12.6 sec;   INR: 1.15 ratio         PTT - ( 14 Oct 2017 09:39 )  PTT:29.5 sec  CARDIAC MARKERS ( 14 Oct 2017 18:28 )  x     / <0.01 ng/mL / 88 U/L / x     / 2.1 ng/mL  CARDIAC MARKERS ( 14 Oct 2017 09:39 )  x     / <0.01 ng/mL / 93 U/L / x     / 2.0 ng/mL      Blood Culture: Organism --  Gram Stain Blood -- Gram Stain   polymorphonuclear leukocytes seen  No organisms seen  by cytocentrifuge  Specimen Source Pleural Fl Pleural Fluid  Culture-Blood --      10-14 @ 09:39  Pro Bnp 324        RADIOLOGY:  < from: CT Chest No Cont (10.14.17 @ 13:00) >    EXAM:  CT CHEST                            PROCEDURE DATE:  10/14/2017            INTERPRETATION:  CLINICAL INFORMATION: Shortness of breath. Evaluate   pleural effusion. Stage IV prostate cancer.    COMPARISON: CT chest 7/31/2017.    PROCEDURE:   CT of the Chest was performed without intravenous contrast.  Sagittal and coronal reformats were performed.      FINDINGS:    CHEST:     LUNGS AND LARGE AIRWAYS: Patent central airways. Bibasilar compressive   atelectasis.  PLEURA: Moderate partially loculated left hydropneumothorax. Moderate   partially loculated right pleural effusion.  VESSELS: The ascending aorta measures 4.2 cm at the level of the main   pulmonary artery. Atheromatous disease of the aorta and coronary arteries.  HEART: Heart size is normal.No pericardial effusion.  MEDIASTINUM AND DAVIE: Calcified mediastinal lymph nodes.  CHEST WALL AND LOWER NECK: Within normal limits.  VISUALIZED UPPER ABDOMEN: Splenic cyst. Complex left renal cystic lesion   with nodular peripheral calcification.  BONES: Diffuse osseous sclerotic metastases. Degenerative changes of the   spine.    IMPRESSION:   Moderate partially loculated left hydropneumothorax.   Moderate partially loculated right pleural effusion.  Moderately severe bilateral compressive atelectasis.  Diffuse osseous sclerotic metastases.  Indeterminate complex left renal cystic lesion with nodular peripheral   calcification.      Findings were discussed with Dr. Rodriguez by Dr. Mcwilliams on 10/14/2017 at   12:50 PM.                  RUBIO MCWILLIAMS M.D., RADIOLOGY RESIDENT  This document has been electronically signed.  ELIAN JOY M.D., ATTENDING RADIOLOGIST  This document has been electronically signed. Oct 14 2017  1:02PM                < end of copied text >

## 2017-10-15 NOTE — PROGRESS NOTE ADULT - SUBJECTIVE AND OBJECTIVE BOX
VITAL SIGNS      Vital Signs Last 24 Hrs  T(C): 36.8 (10-15-17 @ 12:25), Max: 37.7 (10-14-17 @ 20:56)  T(F): 98.2 (10-15-17 @ 12:25), Max: 99.9 (10-14-17 @ 20:56)  HR: 102 (10-15-17 @ 12:25) (96 - 111)  BP: 127/87 (10-15-17 @ 12:25) (110/75 - 127/87)  RR: 18 (10-15-17 @ 12:25) (18 - 21)  SpO2: 95% (10-15-17 @ 12:25) (92% - 95%)            10-14 @ 07:01  -  10-15 @ 07:00  --------------------------------------------------------  IN: 200 mL / OUT: 1800 mL / NET: -1600 mL    10-15 @ 07:01  -  10-15 @ 15:32  --------------------------------------------------------  IN: 360 mL / OUT: 300 mL / NET: 60 mL       Daily Height in cm: 162.56 (14 Oct 2017 20:22)    Daily   Admit Wt: Drug Dosing Weight  Height (cm): 162.56 (14 Oct 2017 20:22)  Weight (kg): 60.8 (15 Oct 2017 05:15)  BMI (kg/m2): 23 (15 Oct 2017 05:15)  BSA (m2): 1.65 (15 Oct 2017 05:15)            MEDICATIONS  bicalutamide 150 milliGRAM(s) Oral daily  cefuroxime  IVPB 1500 milliGRAM(s) IV Intermittent once  chlorhexidine 0.12% Liquid 30 milliLiter(s) Swish and Spit once  cholecalciferol 1000 Unit(s) Oral daily  dronabinol 2.5 milliGRAM(s) Oral two times a day  heparin  Injectable 5000 Unit(s) SubCutaneous every 8 hours  influenza   Vaccine 0.5 milliLiter(s) IntraMuscular once      PHYSICAL EXAM    Subjective: c/o generalized weakness   Neurology: alert and oriented x 3, nonfocal, no gross deficits  CV : s1s1 reg no MRGS  Lungs: CTA, equal chest expansion  left pl chest tube to suction   Abdomen: soft, nontender, nondistended, positive bowel sounds, last bowel movement 10/15/17  :    voids  Extremities:  no edema,  +dp b/l , no calf tenderness b/l , warm and perfused b/l    LABS  10-14    138  |  103  |  10  ----------------------------<  136<H>  4.3   |  26  |  0.63    Ca    8.7      14 Oct 2017 18:28    TPro  6.6  /  Alb  3.4  /  TBili  0.4  /  DBili  x   /  AST  26  /  ALT  9<L>  /  AlkPhos  50  10-14                                 11.0   4.7   )-----------( 276      ( 15 Oct 2017 05:43 )             32.5          PT/INR - ( 14 Oct 2017 09:39 )   PT: 12.6 sec;   INR: 1.15 ratio         PTT - ( 14 Oct 2017 09:39 )  PTT:29.5 sec         PAST MEDICAL & SURGICAL HISTORY:  Anemia  Prostate CA: stage IV with metastasis to bone  No significant past surgical history       Physical Therapy Rec:   Home  [  ]   Home w/ PT  [ x ]  Rehab  [  ]    < from: CT Angio Chest w/ IV Cont (10.15.17 @ 11:15) >    IMPRESSION: No pulmonary embolus is noted.    Interval placement of a left pigtail catheter with a trace residual left   hydropneumothorax.    Moderate to large right pleural effusion.    < end of copied text >

## 2017-10-15 NOTE — PROGRESS NOTE ADULT - SUBJECTIVE AND OBJECTIVE BOX
chief complaint : shortness of breath         SUBJECTIVE / OVERNIGHT EVENTS: s/p chest tube placement on left side, pt says his breathing is much better than before      MEDICATIONS  (STANDING):  bicalutamide 150 milliGRAM(s) Oral daily  cefuroxime  IVPB 1500 milliGRAM(s) IV Intermittent once  chlorhexidine 0.12% Liquid 30 milliLiter(s) Swish and Spit once  cholecalciferol 1000 Unit(s) Oral daily  dronabinol 2.5 milliGRAM(s) Oral two times a day  heparin  Injectable 5000 Unit(s) SubCutaneous every 8 hours  influenza   Vaccine 0.5 milliLiter(s) IntraMuscular once    MEDICATIONS  (PRN):        CAPILLARY BLOOD GLUCOSE        I&O's Summary    14 Oct 2017 07:01  -  15 Oct 2017 07:00  --------------------------------------------------------  IN: 200 mL / OUT: 1800 mL / NET: -1600 mL    15 Oct 2017 07:01  -  15 Oct 2017 22:13  --------------------------------------------------------  IN: 360 mL / OUT: 700 mL / NET: -340 mL        Constitutional: No fever, fatigue  Skin: No rash.  Eyes: No recent vision problems or eye pain.  ENT: No congestion, ear pain, or sore throat.  Cardiovascular: No chest pain or palpation.  Respiratory: No cough, shortness of breath, congestion, or wheezing.  Gastrointestinal: No abdominal pain, nausea, vomiting, or diarrhea.  Genitourinary: No dysuria.  Musculoskeletal: No joint swelling.  Neurologic: No headache.    PHYSICAL EXAM:  GENERAL: NAD  EYES: EOMI, PERRLA  NECK: Supple, No JVD  CHEST/LUNG: dec breath sounds at bases   HEART:  S1 , S2 +  ABDOMEN: soft, bs+  EXTREMITIES:  no edema  NEUROLOGY: alert awake oriented       LABS:                        11.0   4.7   )-----------( 276      ( 15 Oct 2017 05:43 )             32.5     10-14    138  |  103  |  10  ----------------------------<  136<H>  4.3   |  26  |  0.63    Ca    8.7      14 Oct 2017 18:28    TPro  6.6  /  Alb  3.4  /  TBili  0.4  /  DBili  x   /  AST  26  /  ALT  9<L>  /  AlkPhos  50  10-14    PT/INR - ( 14 Oct 2017 09:39 )   PT: 12.6 sec;   INR: 1.15 ratio         PTT - ( 14 Oct 2017 09:39 )  PTT:29.5 sec  CARDIAC MARKERS ( 14 Oct 2017 18:28 )  x     / <0.01 ng/mL / 88 U/L / x     / 2.1 ng/mL  CARDIAC MARKERS ( 14 Oct 2017 09:39 )  x     / <0.01 ng/mL / 93 U/L / x     / 2.0 ng/mL          RADIOLOGY & ADDITIONAL TESTS:    Imaging Personally Reviewed:    Consultant(s) Notes Reviewed:      Care Discussed with Consultants/Other Providers:

## 2017-10-15 NOTE — PROGRESS NOTE ADULT - SUBJECTIVE AND OBJECTIVE BOX
[facility]    CHIEF COMPLAINT:    Interval Events: No acute events noted overnight. CT connected to   Complains of pain at the CT site. Xray yesterday showed appropriate placement of the CT with resolution of the hydropneumothorax.  Fluid appeared exudative in nature, with negative gm stain, normal TG levels and normal pH.    REVIEW OF SYSTEMS:  Constitutional: [ ] negative [-] fevers [-] chills [ ] weight loss [ ] weight gain  HEENT: [ ] negative [ ] dry eyes [ ] eye irritation [ ] postnasal drip [ ] nasal congestion  CV: [ ] negative  [+] chest pain [-] orthopnea [-] palpitations [ ] murmur  Resp: [ ] negative [-] cough [+] shortness of breath [+] dyspnea [-] wheezing [-] sputum [ ] hemoptysis  GI: [ ] negative [-] nausea [-] vomiting [-] diarrhea [-] constipation [-] abd pain [ ] dysphagia   : [ ] negative [-] dysuria [ ] nocturia [ ] hematuria [ ] increased urinary frequency  Musculoskeletal: [ ] negative [ ] back pain [ ] myalgias [ ] arthralgias [ ] fracture  Skin: [-] negative [ ] rash [ ] itch  Neurological: [-] negative [ ] headache [ ] dizziness [ ] syncope [ ] weakness [ ] numbness  [-] All other systems negative      OBJECTIVE:  ICU Vital Signs Last 24 Hrs  T(C): 37.2 (15 Oct 2017 04:11), Max: 37.7 (14 Oct 2017 20:56)  T(F): 98.9 (15 Oct 2017 04:11), Max: 99.9 (14 Oct 2017 20:56)  HR: 96 (15 Oct 2017 04:11) (96 - 113)  BP: 110/75 (15 Oct 2017 04:11) (110/75 - 154/109)  BP(mean): --  ABP: --  ABP(mean): --  RR: 20 (15 Oct 2017 04:11) (20 - 28)  SpO2: 93% (15 Oct 2017 04:11) (91% - 100%)        10-14 @ 07:01  -  10-15 @ 07:00  --------------------------------------------------------  IN: 200 mL / OUT: 1800 mL / NET: -1600 mL    CAPILLARY BLOOD GLUCOSE    PHYSICAL EXAM:  General: Awake, alert, oriented X 3.   HEENT: Atraumatic, normocephalic.               Mallampatti stGstrstastdstest:st st1st No nasal congestion.              No tonsillar or pharyngeal exudates.  Lymph Nodes: No palpable lymphadenopathy  Neck: No JVD. No carotid bruit.   Respiratory: Improved aeration of the left compared to yesterday. CT in place with dressing on top, connected to pleurovac.  Cardiovascular: S1 S2 normal. No murmurs, rubs or gallops.   Abdomen: Soft, non-tender, non-distended. No organomegaly.  Extremities: Warm to touch. Peripheral pulse palpable. No pedal edema.   Skin: No rashes or skin lesions  Neurological: Motor and sensory examination equal and normal in all four extremities.  Psychiatry: Appropriate mood and affect.      HOSPITAL MEDICATIONS:  MEDICATIONS  (STANDING):  bicalutamide 150 milliGRAM(s) Oral daily  cholecalciferol 1000 Unit(s) Oral daily  dronabinol 2.5 milliGRAM(s) Oral two times a day  heparin  Injectable 5000 Unit(s) SubCutaneous every 8 hours  influenza   Vaccine 0.5 milliLiter(s) IntraMuscular once    MEDICATIONS  (PRN):      LABS:                        11.0   4.7   )-----------( 276      ( 15 Oct 2017 05:43 )             32.5     10-14    138  |  103  |  10  ----------------------------<  136<H>  4.3   |  26  |  0.63    Ca    8.7      14 Oct 2017 18:28    TPro  6.6  /  Alb  3.4  /  TBili  0.4  /  DBili  x   /  AST  26  /  ALT  9<L>  /  AlkPhos  50  10-14    PT/INR - ( 14 Oct 2017 09:39 )   PT: 12.6 sec;   INR: 1.15 ratio         PTT - ( 14 Oct 2017 09:39 )  PTT:29.5 sec      Venous Blood Gas:  10-14 @ 09:39  7.44/39/34/26/56  VBG Lactate: 1.8      MICROBIOLOGY:     RADIOLOGY:  [X] Reviewed and interpreted by me. Chest Xray from yesterday showed improvement of the left hydropneumothorax with interval placement of the left sided pigtail catheter.    CHEST CT: 10/15 - Resolution of the hydropneumothorax on the left. Right pleural effusion, Interval Events: No acute events noted overnight. CT connected to clws. CTA CHEST performed this am.   Complains of pain at the CT site. Xray yesterday showed appropriate placement of the CT with resolution of the hydropneumothorax.  Fluid appeared exudative in nature, with negative gm stain, normal TG levels and normal pH.    REVIEW OF SYSTEMS:  Constitutional: [ ] negative [-] fevers [-] chills [ ] weight loss [ ] weight gain  HEENT: [ ] negative [ ] dry eyes [ ] eye irritation [ ] postnasal drip [ ] nasal congestion  CV: [ ] negative  [+] chest pain [-] orthopnea [-] palpitations [ ] murmur  Resp: [ ] negative [-] cough [+] shortness of breath [+] dyspnea [-] wheezing [-] sputum [ ] hemoptysis  GI: [ ] negative [-] nausea [-] vomiting [-] diarrhea [-] constipation [-] abd pain [ ] dysphagia   : [ ] negative [-] dysuria [ ] nocturia [ ] hematuria [ ] increased urinary frequency  Musculoskeletal: [ ] negative [ ] back pain [ ] myalgias [ ] arthralgias [ ] fracture  Skin: [-] negative [ ] rash [ ] itch  Neurological: [-] negative [ ] headache [ ] dizziness [ ] syncope [ ] weakness [ ] numbness  [-] All other systems negative      OBJECTIVE:  ICU Vital Signs Last 24 Hrs  T(C): 37.2 (15 Oct 2017 04:11), Max: 37.7 (14 Oct 2017 20:56)  T(F): 98.9 (15 Oct 2017 04:11), Max: 99.9 (14 Oct 2017 20:56)  HR: 96 (15 Oct 2017 04:11) (96 - 113)  BP: 110/75 (15 Oct 2017 04:11) (110/75 - 154/109)  BP(mean): --  ABP: --  ABP(mean): --  RR: 20 (15 Oct 2017 04:11) (20 - 28)  SpO2: 93% (15 Oct 2017 04:11) (91% - 100%)        10-14 @ 07:01  -  10-15 @ 07:00  --------------------------------------------------------  IN: 200 mL / OUT: 1800 mL / NET: -1600 mL    CAPILLARY BLOOD GLUCOSE    PHYSICAL EXAM:  General: Awake, alert, oriented X 3.   HEENT: Atraumatic, normocephalic.               Mallampatti stGstrstastdstest:st st1st No nasal congestion.              No tonsillar or pharyngeal exudates.  Lymph Nodes: No palpable lymphadenopathy  Neck: No JVD. No carotid bruit.   Respiratory: Improved aeration of the left compared to yesterday. CT in place with dressing on top, connected to pleurovac.  Cardiovascular: S1 S2 normal. No murmurs, rubs or gallops.   Abdomen: Soft, non-tender, non-distended. No organomegaly.  Extremities: Warm to touch. Peripheral pulse palpable. No pedal edema.   Skin: No rashes or skin lesions  Neurological: Motor and sensory examination equal and normal in all four extremities.  Psychiatry: Appropriate mood and affect.      HOSPITAL MEDICATIONS:  MEDICATIONS  (STANDING):  bicalutamide 150 milliGRAM(s) Oral daily  cholecalciferol 1000 Unit(s) Oral daily  dronabinol 2.5 milliGRAM(s) Oral two times a day  heparin  Injectable 5000 Unit(s) SubCutaneous every 8 hours  influenza   Vaccine 0.5 milliLiter(s) IntraMuscular once    MEDICATIONS  (PRN):      LABS:                        11.0   4.7   )-----------( 276      ( 15 Oct 2017 05:43 )             32.5     10-14    138  |  103  |  10  ----------------------------<  136<H>  4.3   |  26  |  0.63    Ca    8.7      14 Oct 2017 18:28    TPro  6.6  /  Alb  3.4  /  TBili  0.4  /  DBili  x   /  AST  26  /  ALT  9<L>  /  AlkPhos  50  10-14    PT/INR - ( 14 Oct 2017 09:39 )   PT: 12.6 sec;   INR: 1.15 ratio         PTT - ( 14 Oct 2017 09:39 )  PTT:29.5 sec      Venous Blood Gas:  10-14 @ 09:39  7.44/39/34/26/56  VBG Lactate: 1.8      MICROBIOLOGY:     RADIOLOGY:  [X] Reviewed and interpreted by me. Chest Xray from yesterday showed improvement of the left hydropneumothorax with interval placement of the left sided pigtail catheter.    CHEST CT: 10/15 - Resolution of the hydropneumothorax on the left. Right pleural effusion, Patient is a 67y old  Male who presents with a chief complaint of sob (14 Oct 2017 12:27)    Interval Events: No acute events noted overnight. CT connected to cl. CTA CHEST performed this am.   Complains of pain at the CT site. Xray yesterday showed appropriate placement of the CT with resolution of the hydropneumothorax.  Fluid appeared exudative in nature, with negative gm stain, normal TG levels and normal pH.    REVIEW OF SYSTEMS:  Constitutional: [ ] negative [-] fevers [-] chills [ ] weight loss [ ] weight gain  HEENT: [ ] negative [ ] dry eyes [ ] eye irritation [ ] postnasal drip [ ] nasal congestion  CV: [ ] negative  [+] chest pain [-] orthopnea [-] palpitations [ ] murmur  Resp: [ ] negative [-] cough [+] shortness of breath [+] dyspnea [-] wheezing [-] sputum [ ] hemoptysis  GI: [ ] negative [-] nausea [-] vomiting [-] diarrhea [-] constipation [-] abd pain [ ] dysphagia   : [ ] negative [-] dysuria [ ] nocturia [ ] hematuria [ ] increased urinary frequency  Musculoskeletal: [ ] negative [ ] back pain [ ] myalgias [ ] arthralgias [ ] fracture  Skin: [-] negative [ ] rash [ ] itch  Neurological: [-] negative [ ] headache [ ] dizziness [ ] syncope [ ] weakness [ ] numbness  [-] All other systems negative      OBJECTIVE:  ICU Vital Signs Last 24 Hrs  T(C): 37.2 (15 Oct 2017 04:11), Max: 37.7 (14 Oct 2017 20:56)  T(F): 98.9 (15 Oct 2017 04:11), Max: 99.9 (14 Oct 2017 20:56)  HR: 96 (15 Oct 2017 04:11) (96 - 113)  BP: 110/75 (15 Oct 2017 04:11) (110/75 - 154/109)  BP(mean): --  ABP: --  ABP(mean): --  RR: 20 (15 Oct 2017 04:11) (20 - 28)  SpO2: 93% (15 Oct 2017 04:11) (91% - 100%)        10-14 @ 07:01  -  10-15 @ 07:00  --------------------------------------------------------  IN: 200 mL / OUT: 1800 mL / NET: -1600 mL    CAPILLARY BLOOD GLUCOSE    PHYSICAL EXAM:  General: Awake, alert, oriented X 3.   HEENT: Atraumatic, normocephalic.               Mallampatti stGstrstastdstest:st st1st No nasal congestion.              No tonsillar or pharyngeal exudates.  Lymph Nodes: No palpable lymphadenopathy  Neck: No JVD. No carotid bruit.   Respiratory: Improved aeration of the left compared to yesterday. CT in place with dressing on top, connected to pleurovac.  Cardiovascular: S1 S2 normal. No murmurs, rubs or gallops.   Abdomen: Soft, non-tender, non-distended. No organomegaly.  Extremities: Warm to touch. Peripheral pulse palpable. No pedal edema.   Skin: No rashes or skin lesions  Neurological: Motor and sensory examination equal and normal in all four extremities.  Psychiatry: Appropriate mood and affect.      HOSPITAL MEDICATIONS:  MEDICATIONS  (STANDING):  bicalutamide 150 milliGRAM(s) Oral daily  cholecalciferol 1000 Unit(s) Oral daily  dronabinol 2.5 milliGRAM(s) Oral two times a day  heparin  Injectable 5000 Unit(s) SubCutaneous every 8 hours  influenza   Vaccine 0.5 milliLiter(s) IntraMuscular once    MEDICATIONS  (PRN):      LABS:                        11.0   4.7   )-----------( 276      ( 15 Oct 2017 05:43 )             32.5     10-14    138  |  103  |  10  ----------------------------<  136<H>  4.3   |  26  |  0.63    Ca    8.7      14 Oct 2017 18:28    TPro  6.6  /  Alb  3.4  /  TBili  0.4  /  DBili  x   /  AST  26  /  ALT  9<L>  /  AlkPhos  50  10-14    PT/INR - ( 14 Oct 2017 09:39 )   PT: 12.6 sec;   INR: 1.15 ratio         PTT - ( 14 Oct 2017 09:39 )  PTT:29.5 sec      Venous Blood Gas:  10-14 @ 09:39  7.44/39/34/26/56  VBG Lactate: 1.8      MICROBIOLOGY:     RADIOLOGY:  [X] Reviewed and interpreted by me. Chest Xray from yesterday showed improvement of the left hydropneumothorax with interval placement of the left sided pigtail catheter.    CHEST CT: 10/15 - Resolution of the hydropneumothorax on the left. Right pleural effusion,

## 2017-10-15 NOTE — PROGRESS NOTE ADULT - ASSESSMENT
68 y/o M with history of metastatic prostate cancer with bilateral pleural effusion, now presents with worsening shortness of breath with left hydropneumothorax. s/p left pl chest tube placed 10/14/17

## 2017-10-15 NOTE — CONSULT NOTE ADULT - ASSESSMENT
67 M with history of metastatic prostate cancer to bone, recurrent pleural effusion s/p drainage presents with worsening dyspnea over the last 2-3 days Dyspnea worse with exertion with hydropneumothorax s/p chest tube  - Chest discomfort is nonanginal and now 2/2 to chest tube  - Chest tube care per pulm  - Await fluid anlysis  - old echo showed thickend pericardium. EKG with low voltage. Please repeat an echo to make sure now pericardial effusion/   - Monitor and replete electrolytes. Keep K>4.0 and Mg>2.0.   - Further cardiac workup will depend on clinical course.   - All other workup per primary team. Will followup.

## 2017-10-16 LAB
HCT VFR BLD CALC: 32.1 % — LOW (ref 39–50)
HGB BLD-MCNC: 10.1 G/DL — LOW (ref 13–17)
INR BLD: 1.11 RATIO — SIGNIFICANT CHANGE UP (ref 0.88–1.16)
MCHC RBC-ENTMCNC: 28.5 PG — SIGNIFICANT CHANGE UP (ref 27–34)
MCHC RBC-ENTMCNC: 31.6 GM/DL — LOW (ref 32–36)
MCV RBC AUTO: 90.2 FL — SIGNIFICANT CHANGE UP (ref 80–100)
PLATELET # BLD AUTO: 316 K/UL — SIGNIFICANT CHANGE UP (ref 150–400)
PROTHROM AB SERPL-ACNC: 12 SEC — SIGNIFICANT CHANGE UP (ref 9.8–12.7)
RBC # BLD: 3.56 M/UL — LOW (ref 4.2–5.8)
RBC # FLD: 16.4 % — HIGH (ref 10.3–14.5)
WBC # BLD: 3.6 K/UL — LOW (ref 3.8–10.5)
WBC # FLD AUTO: 3.6 K/UL — LOW (ref 3.8–10.5)

## 2017-10-16 PROCEDURE — 94010 BREATHING CAPACITY TEST: CPT | Mod: 26

## 2017-10-16 PROCEDURE — 71010: CPT | Mod: 26

## 2017-10-16 PROCEDURE — 71010: CPT | Mod: 26,77

## 2017-10-16 PROCEDURE — 99233 SBSQ HOSP IP/OBS HIGH 50: CPT

## 2017-10-16 RX ORDER — SODIUM CHLORIDE 0.65 %
1 AEROSOL, SPRAY (ML) NASAL
Qty: 0 | Refills: 0 | Status: DISCONTINUED | OUTPATIENT
Start: 2017-10-16 | End: 2017-10-17

## 2017-10-16 RX ADMIN — HEPARIN SODIUM 5000 UNIT(S): 5000 INJECTION INTRAVENOUS; SUBCUTANEOUS at 21:23

## 2017-10-16 RX ADMIN — BICALUTAMIDE 150 MILLIGRAM(S): 50 TABLET, FILM COATED ORAL at 11:54

## 2017-10-16 RX ADMIN — HEPARIN SODIUM 5000 UNIT(S): 5000 INJECTION INTRAVENOUS; SUBCUTANEOUS at 13:28

## 2017-10-16 RX ADMIN — Medication 1 TABLET(S): at 18:04

## 2017-10-16 RX ADMIN — Medication 1000 UNIT(S): at 11:53

## 2017-10-16 RX ADMIN — Medication 2.5 MILLIGRAM(S): at 18:04

## 2017-10-16 RX ADMIN — HEPARIN SODIUM 5000 UNIT(S): 5000 INJECTION INTRAVENOUS; SUBCUTANEOUS at 05:50

## 2017-10-16 NOTE — PROGRESS NOTE ADULT - SUBJECTIVE AND OBJECTIVE BOX
chief complaint : shortness of breath         SUBJECTIVE / OVERNIGHT EVENTS: s/p chest tube placement on left side, pt says his breathing is much better than before    MEDICATIONS  (STANDING):  bicalutamide 150 milliGRAM(s) Oral daily  calcium carbonate 1250 mG + Vitamin D (OsCal 500 + D) 1 Tablet(s) Oral daily  cefuroxime  IVPB 1500 milliGRAM(s) IV Intermittent once  chlorhexidine 0.12% Liquid 30 milliLiter(s) Swish and Spit once  cholecalciferol 1000 Unit(s) Oral daily  dronabinol 2.5 milliGRAM(s) Oral two times a day  heparin  Injectable 5000 Unit(s) SubCutaneous every 8 hours  influenza   Vaccine 0.5 milliLiter(s) IntraMuscular once    MEDICATIONS  (PRN):  sodium chloride 0.65% Nasal 1 Spray(s) Both Nostrils two times a day PRN Nasal Congestion      Vital Signs Last 24 Hrs  T(C): 36.9 (16 Oct 2017 21:39), Max: 36.9 (16 Oct 2017 21:39)  T(F): 98.4 (16 Oct 2017 21:39), Max: 98.4 (16 Oct 2017 21:39)  HR: 107 (16 Oct 2017 21:39) (76 - 107)  BP: 107/76 (16 Oct 2017 21:39) (107/76 - 136/83)  BP(mean): --  RR: 18 (16 Oct 2017 21:39) (18 - 19)  SpO2: 92% (16 Oct 2017 21:39) (91% - 96%)    Constitutional: No fever, fatigue  Skin: No rash.  Eyes: No recent vision problems or eye pain.  ENT: No congestion, ear pain, or sore throat.  Cardiovascular: No chest pain or palpation.  Respiratory: No cough, shortness of breath, congestion, or wheezing.  Gastrointestinal: No abdominal pain, nausea, vomiting, or diarrhea.  Genitourinary: No dysuria.  Musculoskeletal: No joint swelling.  Neurologic: No headache.    PHYSICAL EXAM:  GENERAL: NAD  EYES: EOMI, PERRLA  NECK: Supple, No JVD  CHEST/LUNG: dec breath sounds at bases   HEART:  S1 , S2 +  ABDOMEN: soft, bs+  EXTREMITIES:  no edema  NEUROLOGY: alert awake oriented     LABS:        Creatinine Trend: 0.63 <--, 0.54 <--                        10.1   3.6   )-----------( 316      ( 16 Oct 2017 06:13 )             32.1     Urine Studies:              PT/INR - ( 16 Oct 2017 06:13 )   PT: 12.0 sec;   INR: 1.11 ratio                   RADIOLOGY & ADDITIONAL TESTS:    Imaging Personally Reviewed:    Consultant(s) Notes Reviewed:      Care Discussed with Consultants/Other Providers:

## 2017-10-16 NOTE — PROGRESS NOTE ADULT - SUBJECTIVE AND OBJECTIVE BOX
Subjective: Pt states "I'm tired" denies any CP, SOB, N/V and palpitations. No acute events overnight.     Vital Signs Last 24 Hrs  T(F): 98, Max: 98  HR: 76   BP: 117/81   RR: 18   SpO2: 96% 3L NC      10-16-17 @ 07:01  -  10-16-17 @ 12:26  --------------------------------------------------------  IN: 0 mL / OUT: 0 mL / NET: 0 mL    Daily Weight in k.9 (16 Oct 2017 04:12)    Relevant labs, radiology and Medications reviewed                        10.1   3.6   )-----------( 316      ( 16 Oct 2017 06:13 )             32.1     138  |  103  |  10  ----------------------------<  136<H>  4.3   |  26  |  0.63          PHYSICAL EXAM  Neurology: A&Ox3, NAD, no gross deficits  CV : RRR+S1S2  Lungs: Respirations non-labored, B/L BS diminished   + Left CT to LWS with serosanguinous drainage -430ml/24h, no air leak  Abdomen: Soft, NT/ND, +BSx4Q, +BM  : Voiding without difficulty  Extremities: B/L LE trace edema, negative calf tenderness, +PP           MEDICATIONS  bicalutamide 150 milliGRAM(s) Oral daily  cefuroxime  IVPB 1500 milliGRAM(s) IV Intermittent once  chlorhexidine 0.12% Liquid 30 milliLiter(s) Swish and Spit once  cholecalciferol 1000 Unit(s) Oral daily  dronabinol 2.5 milliGRAM(s) Oral two times a day  heparin  Injectable 5000 Unit(s) SubCutaneous every 8 hours  influenza   Vaccine 0.5 milliLiter(s) IntraMuscular once  sodium chloride 0.65% Nasal 1 Spray(s) Both Nostrils two times a day PRN      Pt seen and examined with thoracic surgery attending, Dr. Humphries.

## 2017-10-16 NOTE — PROGRESS NOTE ADULT - ASSESSMENT
68 y/o M with PMH of metastatic prostate cancer to bone diagnosed in 2012 and underwent XRT in 2013, now presents to ED on 10/14 with worsening LAMBERT over the last 2-3 days a/w non productive cough. No chest pain, chills, fevers, hemoptysis, or syncope. The patient was here for similar complains in August and was found to have bilateral pleural effusions, right > left. He underwent a diagnostic and therapeutic right thoracentesis at that time. He was . He is now on chemotherapy previously on avastin, carboplatin/taxotere with dasatinib, now on taxotere and avastin. Chest CT 10/14 with moderate partially loculated left hydropneumothorax, and loculated right pleural effusion which was not present on previous imaging in August. Pulmonary following, s/p 10/14 emergent left pleural chest tube placement by Pulmonary. Chest CT 10/15 interval placement of left pigtail catheter with trace residual left hydropneumothorax. Thoracic surgery consulted for persistent moderate to large R loculated pleural effusion.

## 2017-10-16 NOTE — PROGRESS NOTE ADULT - SUBJECTIVE AND OBJECTIVE BOX
[facility] - Kinjal Clifford, Darlyn, Charley, Lewis, Ghulam Castañeda  Office Number:  [phone]    Patient resting comfortably in bed in NAD.  Laying flat with no respiratory distress.  No complaints of chest pain, dyspnea, palpitations, PND, or orthopnea.    Telemetry:  Sinus rhythm with no events.    MEDICATIONS  (STANDING):  bicalutamide 150 milliGRAM(s) Oral daily  cefuroxime  IVPB 1500 milliGRAM(s) IV Intermittent once  chlorhexidine 0.12% Liquid 30 milliLiter(s) Swish and Spit once  cholecalciferol 1000 Unit(s) Oral daily  dronabinol 2.5 milliGRAM(s) Oral two times a day  heparin  Injectable 5000 Unit(s) SubCutaneous every 8 hours  influenza   Vaccine 0.5 milliLiter(s) IntraMuscular once    MEDICATIONS  (PRN):      Allergies    No Known Allergies      Vital Signs Last 24 Hrs  T(C): 36.7 (16 Oct 2017 04:12), Max: 36.8 (15 Oct 2017 12:25)  T(F): 98 (16 Oct 2017 04:12), Max: 98.2 (15 Oct 2017 12:25)  HR: 76 (16 Oct 2017 04:12) (76 - 102)  BP: 117/81 (16 Oct 2017 04:12) (117/81 - 127/87)  BP(mean): --  RR: 18 (16 Oct 2017 04:12) (18 - 18)  SpO2: 96% (16 Oct 2017 04:12) (95% - 96%)    I&O's Summary    15 Oct 2017 07:01  -  16 Oct 2017 07:00  --------------------------------------------------------  IN: 460 mL / OUT: 1180 mL / NET: -720 mL        ON EXAM:    General: NAD, awake and alert, oriented x 3  HEENT: Mucous membranes are moist, anicteric  Lungs: Non-labored, breath sounds are clear bilaterally, No wheezing, rales or rhonchi  Cardiovascular: Regular, S1 and S2, no murmurs, rubs, or gallops  Gastrointestinal: Bowel Sounds present, soft, nontender.   Lymph: No peripheral edema. No lymphadenopathy.  Skin: No rashes or ulcers  Psych:  Mood & affect appropriate    LABS: All Labs Reviewed:                        10.1   3.6   )-----------( 316      ( 16 Oct 2017 06:13 )             32.1                         11.0   4.7   )-----------( 276      ( 15 Oct 2017 05:43 )             32.5                         12.1   5.6   )-----------( 330      ( 14 Oct 2017 18:28 )             35.6     14 Oct 2017 18:28    138    |  103    |  10     ----------------------------<  136    4.3     |  26     |  0.63   14 Oct 2017 09:39    139    |  99     |  7      ----------------------------<  88     3.5     |  26     |  0.54     Ca    8.7        14 Oct 2017 18:28  Ca    8.3        14 Oct 2017 09:39    TPro  6.6    /  Alb  3.4    /  TBili  0.4    /  DBili  x      /  AST  26     /  ALT  9      /  AlkPhos  50     14 Oct 2017 09:39    PT/INR - ( 16 Oct 2017 06:13 )   PT: 12.0 sec;   INR: 1.11 ratio           CARDIAC MARKERS ( 14 Oct 2017 18:28 )  x     / <0.01 ng/mL / 88 U/L / x     / 2.1 ng/mL      Blood Culture: Organism --  Gram Stain Blood -- Gram Stain   polymorphonuclear leukocytes seen  No organisms seen  by cytocentrifuge  Specimen Source Pleural Fl Pleural Fluid  Culture-Blood --      10-14 @ 09:39  Pro Bnp 324

## 2017-10-16 NOTE — PROGRESS NOTE ADULT - SUBJECTIVE AND OBJECTIVE BOX
Interval Events:  s/p left chest tube 10/15  Complains of pain at site of insertion    REVIEW OF SYSTEMS:  Constitutional: [ ] negative [ ] fevers [ ] chills [ ] weight loss [ ] weight gain  HEENT: [ ] negative [ ] dry eyes [ ] eye irritation [ ] postnasal drip [ ] nasal congestion  CV: [ ] negative  [ ] chest pain [ ] orthopnea [ ] palpitations [ ] murmur  Resp: [ ] negative [ ] cough [ ] shortness of breath [ ] dyspnea [ ] wheezing [ ] sputum [ ] hemoptysis  GI: [ ] negative [ ] nausea [ ] vomiting [ ] diarrhea [ ] constipation [ ] abd pain [ ] dysphagia   : [ ] negative [ ] dysuria [ ] nocturia [ ] hematuria [ ] increased urinary frequency  Musculoskeletal: [ ] negative [ ] back pain [ ] myalgias [ ] arthralgias [ ] fracture  Skin: [ ] negative [ ] rash [ ] itch  Neurological: [ ] negative [ ] headache [ ] dizziness [ ] syncope [ ] weakness [ ] numbness  Psychiatric: [ ] negative [ ] anxiety [ ] depression  Endocrine: [ ] negative [ ] diabetes [ ] thyroid problem  Hematologic/Lymphatic: [ ] negative [ ] anemia [ ] bleeding problem  Allergic/Immunologic: [ ] negative [ ] itchy eyes [ ] nasal discharge [ ] hives [ ] angioedema  [x] All other systems negative  [ ] Unable to assess ROS because ________    OBJECTIVE:  ICU Vital Signs Last 24 Hrs  T(C): 36.7 (16 Oct 2017 12:05), Max: 36.7 (16 Oct 2017 04:12)  T(F): 98.1 (16 Oct 2017 12:05), Max: 98.1 (16 Oct 2017 12:05)  HR: 86 (16 Oct 2017 12:05) (76 - 98)  BP: 136/83 (16 Oct 2017 12:05) (117/81 - 136/83)  BP(mean): --  ABP: --  ABP(mean): --  RR: 19 (16 Oct 2017 12:05) (18 - 19)  SpO2: 91% (16 Oct 2017 12:05) (91% - 96%)        10-15 @ 07:01  -  10-16 @ 07:00  --------------------------------------------------------  IN: 460 mL / OUT: 1180 mL / NET: -720 mL    10-16 @ 07:01  -  10-16 @ 15:10  --------------------------------------------------------  IN: 200 mL / OUT: 0 mL / NET: 200 mL      CAPILLARY BLOOD GLUCOSE          PHYSICAL EXAM:  General: Awake, alert, oriented X 3.   Lymph Nodes: No palpable LAD  Neck: No JVD.   Respiratory: Good aeration of the left following chest tube placement. CT in place with dressing on top, connected to pleurovac. Right side with decreased breath sounds   Cardiovascular: S1 S2 normal. No murmurs, rubs or gallops.   Abdomen: Soft, non-tender, non-distended. No organomegaly.  Extremities: Warm to touch. Peripheral pulse palpable. No pedal edema.   Skin: No rashes or skin lesions  Neurological: Motor and sensory examination equal and normal in all four extremities.  Psychiatry: Appropriate mood and affect.    HOSPITAL MEDICATIONS:  heparin  Injectable 5000 Unit(s) SubCutaneous every 8 hours    cefuroxime  IVPB 1500 milliGRAM(s) IV Intermittent once          dronabinol 2.5 milliGRAM(s) Oral two times a day      bicalutamide 150 milliGRAM(s) Oral daily      cholecalciferol 1000 Unit(s) Oral daily    influenza   Vaccine 0.5 milliLiter(s) IntraMuscular once    chlorhexidine 0.12% Liquid 30 milliLiter(s) Swish and Spit once  sodium chloride 0.65% Nasal 1 Spray(s) Both Nostrils two times a day PRN        LABS:                        10.1   3.6   )-----------( 316      ( 16 Oct 2017 06:13 )             32.1     Hgb Trend: 10.1<--, 11.0<--, 12.1<--, 11.8<--  10-14    138  |  103  |  10  ----------------------------<  136<H>  4.3   |  26  |  0.63    Ca    8.7      14 Oct 2017 18:28      Creatinine Trend: 0.63<--, 0.54<--  PT/INR - ( 16 Oct 2017 06:13 )   PT: 12.0 sec;   INR: 1.11 ratio             MICROBIOLOGY:     RADIOLOGY:  [ ] Reviewed and interpreted by me    PULMONARY FUNCTION TESTS:    EKG:    ASSESSMENT AND RECOMMENDATION    67 M with history of metastatic prostate cancer with bilateral pleural effusions presented with worsening shortness of breath with left hydropneumothorax. He is s/p left chest tube. Dyspnea improved. Patient is planned for VATS, pleural biopsy and Pleurx placement tomorrow of right side. Pleural fluid studies suggest exudative process. Cytology from last thoracentesis in August was negative for malignancy; however, given history and recurrent effusions, still likely related to malignancy.    -Thoracic plan for right VATS, pleural biopsy and Pleurx placement tomorrow  -Continue supplemental O2  -Can change right chest tube to water seal  -Pain control  -Follow up pleural studies (added cholesterol level today) Interval Events:  s/p left chest tube 10/15  Complains of pain at site of insertion    REVIEW OF SYSTEMS:  Constitutional: [ ] negative [ ] fevers [ ] chills [ ] weight loss [ ] weight gain  HEENT: [ ] negative [ ] dry eyes [ ] eye irritation [ ] postnasal drip [ ] nasal congestion  CV: [ ] negative  [ ] chest pain [ ] orthopnea [ ] palpitations [ ] murmur  Resp: [ ] negative [ ] cough [ ] shortness of breath [ ] dyspnea [ ] wheezing [ ] sputum [ ] hemoptysis  GI: [ ] negative [ ] nausea [ ] vomiting [ ] diarrhea [ ] constipation [ ] abd pain [ ] dysphagia   : [ ] negative [ ] dysuria [ ] nocturia [ ] hematuria [ ] increased urinary frequency  Musculoskeletal: [ ] negative [ ] back pain [ ] myalgias [ ] arthralgias [ ] fracture  Skin: [ ] negative [ ] rash [ ] itch  Neurological: [ ] negative [ ] headache [ ] dizziness [ ] syncope [ ] weakness [ ] numbness  Psychiatric: [ ] negative [ ] anxiety [ ] depression  Endocrine: [ ] negative [ ] diabetes [ ] thyroid problem  Hematologic/Lymphatic: [ ] negative [ ] anemia [ ] bleeding problem  Allergic/Immunologic: [ ] negative [ ] itchy eyes [ ] nasal discharge [ ] hives [ ] angioedema  [x] All other systems negative  [ ] Unable to assess ROS because ________    OBJECTIVE:  ICU Vital Signs Last 24 Hrs  T(C): 36.7 (16 Oct 2017 12:05), Max: 36.7 (16 Oct 2017 04:12)  T(F): 98.1 (16 Oct 2017 12:05), Max: 98.1 (16 Oct 2017 12:05)  HR: 86 (16 Oct 2017 12:05) (76 - 98)  BP: 136/83 (16 Oct 2017 12:05) (117/81 - 136/83)  BP(mean): --  ABP: --  ABP(mean): --  RR: 19 (16 Oct 2017 12:05) (18 - 19)  SpO2: 91% (16 Oct 2017 12:05) (91% - 96%)        10-15 @ 07:01  -  10-16 @ 07:00  --------------------------------------------------------  IN: 460 mL / OUT: 1180 mL / NET: -720 mL    10-16 @ 07:01  -  10-16 @ 15:10  --------------------------------------------------------  IN: 200 mL / OUT: 0 mL / NET: 200 mL      CAPILLARY BLOOD GLUCOSE          PHYSICAL EXAM:  General: Awake, alert, oriented X 3.   Lymph Nodes: No palpable LAD  Neck: No JVD.   Respiratory: Good aeration of the left following chest tube placement. CT in place with dressing on top, connected to pleurovac. Right side with decreased breath sounds   Cardiovascular: S1 S2 normal. No murmurs, rubs or gallops.   Abdomen: Soft, non-tender, non-distended. No organomegaly.  Extremities: Warm to touch. Peripheral pulse palpable. No pedal edema.   Skin: No rashes or skin lesions  Neurological: Motor and sensory examination equal and normal in all four extremities.  Psychiatry: Appropriate mood and affect.    HOSPITAL MEDICATIONS:  heparin  Injectable 5000 Unit(s) SubCutaneous every 8 hours    cefuroxime  IVPB 1500 milliGRAM(s) IV Intermittent once          dronabinol 2.5 milliGRAM(s) Oral two times a day      bicalutamide 150 milliGRAM(s) Oral daily      cholecalciferol 1000 Unit(s) Oral daily    influenza   Vaccine 0.5 milliLiter(s) IntraMuscular once    chlorhexidine 0.12% Liquid 30 milliLiter(s) Swish and Spit once  sodium chloride 0.65% Nasal 1 Spray(s) Both Nostrils two times a day PRN        LABS:                        10.1   3.6   )-----------( 316      ( 16 Oct 2017 06:13 )             32.1     Hgb Trend: 10.1<--, 11.0<--, 12.1<--, 11.8<--  10-14    138  |  103  |  10  ----------------------------<  136<H>  4.3   |  26  |  0.63    Ca    8.7      14 Oct 2017 18:28      Creatinine Trend: 0.63<--, 0.54<--  PT/INR - ( 16 Oct 2017 06:13 )   PT: 12.0 sec;   INR: 1.11 ratio             MICROBIOLOGY:     RADIOLOGY:  [ ] Reviewed and interpreted by me    PULMONARY FUNCTION TESTS:    EKG:    ASSESSMENT AND RECOMMENDATION    67 M with history of metastatic prostate cancer with bilateral pleural effusions presented with worsening shortness of breath with left hydropneumothorax. He is s/p left chest tube. Dyspnea improved. Patient is planned for VATS, pleural biopsy and Pleurx placement tomorrow of right side. Pleural fluid studies suggest exudative process. Cytology from last thoracentesis in August was negative for malignancy; however, given history and recurrent effusions, still likely related to malignancy.    -Thoracic plan for right VATS, pleural biopsy and Pleurx placement tomorrow  -Continue supplemental O2  -Can change right chest tube to water seal  -Pain control  -Follow up pleural studies (added cholesterol level today)  -Please check serum LDH (to compare to fluid LDH) Interval Events:  s/p left chest tube 10/15  Complains of pain at site of insertion    REVIEW OF SYSTEMS:  Constitutional: [ ] negative [ ] fevers [ ] chills [ ] weight loss [ ] weight gain  HEENT: [ ] negative [ ] dry eyes [ ] eye irritation [ ] postnasal drip [ ] nasal congestion  CV: [ ] negative  [ ] chest pain [ ] orthopnea [ ] palpitations [ ] murmur  Resp: [ ] negative [ ] cough [ ] shortness of breath [ ] dyspnea [ ] wheezing [ ] sputum [ ] hemoptysis  GI: [ ] negative [ ] nausea [ ] vomiting [ ] diarrhea [ ] constipation [ ] abd pain [ ] dysphagia   : [ ] negative [ ] dysuria [ ] nocturia [ ] hematuria [ ] increased urinary frequency  Musculoskeletal: [ ] negative [ ] back pain [ ] myalgias [ ] arthralgias [ ] fracture  Skin: [ ] negative [ ] rash [ ] itch  Neurological: [ ] negative [ ] headache [ ] dizziness [ ] syncope [ ] weakness [ ] numbness  Psychiatric: [ ] negative [ ] anxiety [ ] depression  Endocrine: [ ] negative [ ] diabetes [ ] thyroid problem  Hematologic/Lymphatic: [ ] negative [ ] anemia [ ] bleeding problem  Allergic/Immunologic: [ ] negative [ ] itchy eyes [ ] nasal discharge [ ] hives [ ] angioedema  [x] All other systems negative  [ ] Unable to assess ROS because ________    OBJECTIVE:  ICU Vital Signs Last 24 Hrs  T(C): 36.7 (16 Oct 2017 12:05), Max: 36.7 (16 Oct 2017 04:12)  T(F): 98.1 (16 Oct 2017 12:05), Max: 98.1 (16 Oct 2017 12:05)  HR: 86 (16 Oct 2017 12:05) (76 - 98)  BP: 136/83 (16 Oct 2017 12:05) (117/81 - 136/83)  BP(mean): --  ABP: --  ABP(mean): --  RR: 19 (16 Oct 2017 12:05) (18 - 19)  SpO2: 91% (16 Oct 2017 12:05) (91% - 96%)        10-15 @ 07:01  -  10-16 @ 07:00  --------------------------------------------------------  IN: 460 mL / OUT: 1180 mL / NET: -720 mL    10-16 @ 07:01  -  10-16 @ 15:10  --------------------------------------------------------  IN: 200 mL / OUT: 0 mL / NET: 200 mL      CAPILLARY BLOOD GLUCOSE          PHYSICAL EXAM:  General: Awake, alert, oriented X 3.   Lymph Nodes: No palpable LAD  Neck: No JVD.   Respiratory: Good aeration of the left following chest tube placement. CT in place with dressing on top, connected to pleurovac. Right side with decreased breath sounds   Cardiovascular: S1 S2 normal. No murmurs, rubs or gallops.   Abdomen: Soft, non-tender, non-distended. No organomegaly.  Extremities: Warm to touch. Peripheral pulse palpable. No pedal edema.   Skin: No rashes or skin lesions  Neurological: Motor and sensory examination equal and normal in all four extremities.  Psychiatry: Appropriate mood and affect.    HOSPITAL MEDICATIONS:  heparin  Injectable 5000 Unit(s) SubCutaneous every 8 hours    cefuroxime  IVPB 1500 milliGRAM(s) IV Intermittent once          dronabinol 2.5 milliGRAM(s) Oral two times a day      bicalutamide 150 milliGRAM(s) Oral daily      cholecalciferol 1000 Unit(s) Oral daily    influenza   Vaccine 0.5 milliLiter(s) IntraMuscular once    chlorhexidine 0.12% Liquid 30 milliLiter(s) Swish and Spit once  sodium chloride 0.65% Nasal 1 Spray(s) Both Nostrils two times a day PRN        LABS:                        10.1   3.6   )-----------( 316      ( 16 Oct 2017 06:13 )             32.1     Hgb Trend: 10.1<--, 11.0<--, 12.1<--, 11.8<--  10-14    138  |  103  |  10  ----------------------------<  136<H>  4.3   |  26  |  0.63    Ca    8.7      14 Oct 2017 18:28      Creatinine Trend: 0.63<--, 0.54<--  PT/INR - ( 16 Oct 2017 06:13 )   PT: 12.0 sec;   INR: 1.11 ratio             MICROBIOLOGY:     RADIOLOGY:  [ ] Reviewed and interpreted by me    PULMONARY FUNCTION TESTS:    EKG:    ASSESSMENT AND RECOMMENDATION    67 M with history of metastatic prostate cancer with bilateral pleural effusions presented with worsening shortness of breath with left hydropneumothorax. He is s/p left chest tube. Dyspnea improved. Patient is planned for VATS, pleural biopsy and Pleurx placement tomorrow of right side. Pleural fluid studies suggest exudative process. Cytology from last thoracentesis in August was negative for malignancy; however, given history and recurrent effusions, still likely related to malignancy.    -Thoracic plan for right VATS, pleural biopsy and Pleurx placement tomorrow  -Continue supplemental O2  -Can change left chest tube to water seal  -Pain control  -Follow up pleural studies (added cholesterol level today)  -Please check serum LDH (to compare to fluid LDH)

## 2017-10-16 NOTE — PROGRESS NOTE ADULT - ASSESSMENT
67 M with history of metastatic prostate cancer to bone, recurrent pleural effusion s/p drainage presents with worsening dyspnea over the last 2-3 days Dyspnea worse with exertion with hydropneumothorax s/p chest tube  - Chest discomfort is nonanginal and now 2/2 to chest tube  - Chest tube care per pulm and thoracic  - Await fluid analysis  - old echo showed thickened pericardium. EKG with low voltage. Please repeat an echo to make sure now pericardial effusion/   - Monitor and replete electrolytes. Keep K>4.0 and Mg>2.0.   - Further cardiac workup will depend on clinical course.   - All other workup per primary team. Will followup.

## 2017-10-17 ENCOUNTER — RESULT REVIEW (OUTPATIENT)
Age: 67
End: 2017-10-17

## 2017-10-17 LAB
ANION GAP SERPL CALC-SCNC: 11 MMOL/L — SIGNIFICANT CHANGE UP (ref 5–17)
BUN SERPL-MCNC: 19 MG/DL — SIGNIFICANT CHANGE UP (ref 7–23)
CALCIUM SERPL-MCNC: 8.2 MG/DL — LOW (ref 8.4–10.5)
CHLORIDE SERPL-SCNC: 102 MMOL/L — SIGNIFICANT CHANGE UP (ref 96–108)
CO2 SERPL-SCNC: 26 MMOL/L — SIGNIFICANT CHANGE UP (ref 22–31)
CREAT SERPL-MCNC: 0.49 MG/DL — LOW (ref 0.5–1.3)
GLUCOSE SERPL-MCNC: 83 MG/DL — SIGNIFICANT CHANGE UP (ref 70–99)
HCT VFR BLD CALC: 30.6 % — LOW (ref 39–50)
HGB BLD-MCNC: 10.6 G/DL — LOW (ref 13–17)
LDH SERPL L TO P-CCNC: 140 U/L — SIGNIFICANT CHANGE UP (ref 50–242)
MCHC RBC-ENTMCNC: 31 PG — SIGNIFICANT CHANGE UP (ref 27–34)
MCHC RBC-ENTMCNC: 34.6 GM/DL — SIGNIFICANT CHANGE UP (ref 32–36)
MCV RBC AUTO: 89.7 FL — SIGNIFICANT CHANGE UP (ref 80–100)
PLATELET # BLD AUTO: 295 K/UL — SIGNIFICANT CHANGE UP (ref 150–400)
POTASSIUM SERPL-MCNC: 3.7 MMOL/L — SIGNIFICANT CHANGE UP (ref 3.5–5.3)
POTASSIUM SERPL-SCNC: 3.7 MMOL/L — SIGNIFICANT CHANGE UP (ref 3.5–5.3)
RBC # BLD: 3.41 M/UL — LOW (ref 4.2–5.8)
RBC # FLD: 16.5 % — HIGH (ref 10.3–14.5)
SODIUM SERPL-SCNC: 139 MMOL/L — SIGNIFICANT CHANGE UP (ref 135–145)
WBC # BLD: 3.6 K/UL — LOW (ref 3.8–10.5)
WBC # FLD AUTO: 3.6 K/UL — LOW (ref 3.8–10.5)

## 2017-10-17 PROCEDURE — 32609 THORACOSCOPY W/BX PLEURA: CPT

## 2017-10-17 PROCEDURE — 71010: CPT | Mod: 26,77

## 2017-10-17 PROCEDURE — 88305 TISSUE EXAM BY PATHOLOGIST: CPT | Mod: 26

## 2017-10-17 PROCEDURE — 71010: CPT | Mod: 26

## 2017-10-17 PROCEDURE — 88112 CYTOPATH CELL ENHANCE TECH: CPT | Mod: 26

## 2017-10-17 PROCEDURE — 32550 INSERT PLEURAL CATH: CPT

## 2017-10-17 PROCEDURE — 99233 SBSQ HOSP IP/OBS HIGH 50: CPT

## 2017-10-17 RX ORDER — SODIUM CHLORIDE 9 MG/ML
1000 INJECTION, SOLUTION INTRAVENOUS
Qty: 0 | Refills: 0 | Status: DISCONTINUED | OUTPATIENT
Start: 2017-10-17 | End: 2017-10-21

## 2017-10-17 RX ORDER — CHOLECALCIFEROL (VITAMIN D3) 125 MCG
1000 CAPSULE ORAL DAILY
Qty: 0 | Refills: 0 | Status: DISCONTINUED | OUTPATIENT
Start: 2017-10-17 | End: 2017-10-21

## 2017-10-17 RX ORDER — CEFAZOLIN SODIUM 1 G
2000 VIAL (EA) INJECTION EVERY 8 HOURS
Qty: 0 | Refills: 0 | Status: COMPLETED | OUTPATIENT
Start: 2017-10-17 | End: 2017-10-18

## 2017-10-17 RX ORDER — BICALUTAMIDE 50 MG/1
150 TABLET, FILM COATED ORAL DAILY
Qty: 0 | Refills: 0 | Status: DISCONTINUED | OUTPATIENT
Start: 2017-10-17 | End: 2017-10-21

## 2017-10-17 RX ORDER — HEPARIN SODIUM 5000 [USP'U]/ML
5000 INJECTION INTRAVENOUS; SUBCUTANEOUS EVERY 8 HOURS
Qty: 0 | Refills: 0 | Status: DISCONTINUED | OUTPATIENT
Start: 2017-10-17 | End: 2017-10-21

## 2017-10-17 RX ORDER — HYDROMORPHONE HYDROCHLORIDE 2 MG/ML
0.25 INJECTION INTRAMUSCULAR; INTRAVENOUS; SUBCUTANEOUS
Qty: 0 | Refills: 0 | Status: DISCONTINUED | OUTPATIENT
Start: 2017-10-17 | End: 2017-10-17

## 2017-10-17 RX ORDER — DRONABINOL 2.5 MG
2.5 CAPSULE ORAL
Qty: 0 | Refills: 0 | Status: DISCONTINUED | OUTPATIENT
Start: 2017-10-17 | End: 2017-10-21

## 2017-10-17 RX ORDER — SODIUM CHLORIDE 0.65 %
1 AEROSOL, SPRAY (ML) NASAL
Qty: 0 | Refills: 0 | Status: DISCONTINUED | OUTPATIENT
Start: 2017-10-17 | End: 2017-10-21

## 2017-10-17 RX ORDER — BICALUTAMIDE 50 MG/1
50 TABLET, FILM COATED ORAL DAILY
Qty: 0 | Refills: 0 | Status: DISCONTINUED | OUTPATIENT
Start: 2017-10-17 | End: 2017-10-17

## 2017-10-17 RX ADMIN — Medication 100 MILLIGRAM(S): at 23:25

## 2017-10-17 RX ADMIN — SODIUM CHLORIDE 75 MILLILITER(S): 9 INJECTION, SOLUTION INTRAVENOUS at 18:22

## 2017-10-17 NOTE — PROGRESS NOTE ADULT - ASSESSMENT
67 M with history of metastatic prostate cancer to bone, recurrent pleural effusion s/p drainage presents with worsening dyspnea over the last 2-3 days Dyspnea worse with exertion with hydropneumothorax s/p chest tube.    - Chest discomfort is nonanginal and now 2/2 to chest tube  - Chest tube care per pulm and thoracic, left chest tube to water seal.  - Plan for VATS, pleural biopsy and Pleurx placement today of right side. Pleural fluid studies suggest exudative process.  - old echo showed thickened pericardium. EKG with low voltage. Please repeat an echo to make sure now pericardial effusion. His wife also reports that he had a leaky valve in the past.  - Monitor and replete electrolytes. Keep K>4.0 and Mg>2.0.   - Further cardiac workup will depend on clinical course.   - All other workup per primary team. Will followup. 67 M with history of metastatic prostate cancer to bone, recurrent pleural effusion s/p drainage presents with worsening dyspnea over the last 2-3 days Dyspnea worse with exertion with hydropneumothorax s/p chest tube.    - Episode of AF x 35 seconds this morning. Currently resolved. I would continue to watch on tele. Off av flynn blockers.   - Chest discomfort is nonanginal and now 2/2 to chest tube  - Chest tube care per pulm and thoracic, left chest tube to water seal.  - Plan for VATS, pleural biopsy and Pleurx placement today of right side. Pleural fluid studies suggest exudative process.  - old echo showed thickened pericardium. EKG with low voltage. Please repeat an echo to make sure now pericardial effusion. His wife also reports that he had a leaky valve in the past.  - Monitor and replete electrolytes. Keep K>4.0 and Mg>2.0.   - Further cardiac workup will depend on clinical course.   - All other workup per primary team. Will followup.

## 2017-10-17 NOTE — PROGRESS NOTE ADULT - SUBJECTIVE AND OBJECTIVE BOX
[facility] - Kinjal Clifford, Darlyn, Charley, Lewis, Ghulam Castañeda  Office Number:  [phone]    Patient resting comfortably in bed in NAD.  Laying flat with no respiratory distress.   Left chest tube remains in place. Repeat CXR late last night shows no reaccumulation of fluid or air  Plan for VATS  Only complaint is some irritation at site of chest tube insertion    ROS: negative unless otherwise mentioned.    Telemetry:  SR 80 bpm, episode of irregular rhythm to 120, presumably AF x 35 seconds.    MEDICATIONS  (STANDING):  bicalutamide 150 milliGRAM(s) Oral daily  calcium carbonate 1250 mG + Vitamin D (OsCal 500 + D) 1 Tablet(s) Oral daily  cefuroxime  IVPB 1500 milliGRAM(s) IV Intermittent once  chlorhexidine 0.12% Liquid 30 milliLiter(s) Swish and Spit once  cholecalciferol 1000 Unit(s) Oral daily  dronabinol 2.5 milliGRAM(s) Oral two times a day  heparin  Injectable 5000 Unit(s) SubCutaneous every 8 hours  influenza   Vaccine 0.5 milliLiter(s) IntraMuscular once    MEDICATIONS  (PRN):  sodium chloride 0.65% Nasal 1 Spray(s) Both Nostrils two times a day PRN Nasal Congestion      Allergies    No Known Allergies    Intolerances        Vital Signs Last 24 Hrs  T(C): 36.7 (17 Oct 2017 05:04), Max: 36.9 (16 Oct 2017 21:39)  T(F): 98 (17 Oct 2017 05:04), Max: 98.4 (16 Oct 2017 21:39)  HR: 79 (17 Oct 2017 05:04) (79 - 107)  BP: 111/78 (17 Oct 2017 05:04) (107/76 - 111/78)  BP(mean): --  RR: 18 (17 Oct 2017 05:04) (18 - 18)  SpO2: 92% (16 Oct 2017 21:39) (92% - 92%)    I&O's Summary    16 Oct 2017 07:01  -  17 Oct 2017 07:00  --------------------------------------------------------  IN: 700 mL / OUT: 310 mL / NET: 390 mL    17 Oct 2017 07:01  -  17 Oct 2017 12:18  --------------------------------------------------------  IN: 0 mL / OUT: 70 mL / NET: -70 mL        ON EXAM:    General: Awake, alert, oriented X 3.   Neck: No JVD.   Respiratory: CT in place with dressing on top, on water seal. Right side with decreased breath sounds   Cardiovascular: S1 S2 normal. No murmurs, rubs or gallops.   Abdomen: Soft, non-tender, non-distended. No organomegaly.  Extremities: Warm to touch. Peripheral pulse palpable. No pedal edema.   Skin: No rashes or skin lesions  Neurological: Motor and sensory examination equal and normal in all four extremities.    LABS: All Labs Reviewed:                        10.6   3.6   )-----------( 295      ( 17 Oct 2017 06:57 )             30.6                         10.1   3.6   )-----------( 316      ( 16 Oct 2017 06:13 )             32.1                         11.0   4.7   )-----------( 276      ( 15 Oct 2017 05:43 )             32.5     17 Oct 2017 06:57    139    |  102    |  19     ----------------------------<  83     3.7     |  26     |  0.49   14 Oct 2017 18:28    138    |  103    |  10     ----------------------------<  136    4.3     |  26     |  0.63     Ca    8.2        17 Oct 2017 06:57  Ca    8.7        14 Oct 2017 18:28      PT/INR - ( 16 Oct 2017 06:13 )   PT: 12.0 sec;   INR: 1.11 ratio               Blood Culture: Organism --  Gram Stain Blood -- Gram Stain   polymorphonuclear leukocytes seen  No organisms seen  by cytocentrifuge  Specimen Source Pleural Fl Pleural Fluid  Culture-Blood --

## 2017-10-17 NOTE — CHART NOTE - NSCHARTNOTEFT_GEN_A_CORE
plan for right vats with pleurX catheter insertion & pleural biopsy. will leave left pigtail in place for now.   Consent in chart

## 2017-10-17 NOTE — BRIEF OPERATIVE NOTE - PROCEDURE
<<-----Click on this checkbox to enter Procedure Chest tube placement  10/17/2017    Active  CRISTOBAL  Pleural biopsy  10/17/2017    Active  CRISTOBAL  VATS (video-assisted thoracoscopic surgery)  10/17/2017    Active  CRISTOBAL

## 2017-10-17 NOTE — PROGRESS NOTE ADULT - SUBJECTIVE AND OBJECTIVE BOX
chief complaint : shortness of breath         SUBJECTIVE / OVERNIGHT EVENTS: s/p chest tube placement on left side, pt denies chest pain,sob, nausea, v, pts wife next to pts bed anxious about thoracic procedure    MEDICATIONS  (STANDING):  bicalutamide 150 milliGRAM(s) Oral daily  calcium carbonate 1250 mG + Vitamin D (OsCal 500 + D) 1 Tablet(s) Oral daily  cefuroxime  IVPB 1500 milliGRAM(s) IV Intermittent once  chlorhexidine 0.12% Liquid 30 milliLiter(s) Swish and Spit once  cholecalciferol 1000 Unit(s) Oral daily  dronabinol 2.5 milliGRAM(s) Oral two times a day  heparin  Injectable 5000 Unit(s) SubCutaneous every 8 hours  influenza   Vaccine 0.5 milliLiter(s) IntraMuscular once    MEDICATIONS  (PRN):  sodium chloride 0.65% Nasal 1 Spray(s) Both Nostrils two times a day PRN Nasal Congestion      Vital Signs Last 24 Hrs  T(C): 36.7 (17 Oct 2017 05:04), Max: 36.9 (16 Oct 2017 21:39)  T(F): 98 (17 Oct 2017 05:04), Max: 98.4 (16 Oct 2017 21:39)  HR: 79 (17 Oct 2017 05:04) (79 - 107)  BP: 111/78 (17 Oct 2017 05:04) (107/76 - 136/83)  BP(mean): --  RR: 18 (17 Oct 2017 05:04) (18 - 19)  SpO2: 92% (16 Oct 2017 21:39) (91% - 92%)    Constitutional: No fever, fatigue  Skin: No rash.  Eyes: No recent vision problems or eye pain.  ENT: No congestion, ear pain, or sore throat.  Cardiovascular: No chest pain or palpation.  Respiratory: No cough, shortness of breath, congestion, or wheezing.  Gastrointestinal: No abdominal pain, nausea, vomiting, or diarrhea.  Genitourinary: No dysuria.  Musculoskeletal: No joint swelling.  Neurologic: No headache.    PHYSICAL EXAM:  GENERAL: NAD  EYES: EOMI, PERRLA  NECK: Supple, No JVD  CHEST/LUNG: dec breath sounds at bases   HEART:  S1 , S2 +  ABDOMEN: soft, bs+  EXTREMITIES:  no edema  NEUROLOGY: alert awake oriented     LABS:  10-17    139  |  102  |  19  ----------------------------<  83  3.7   |  26  |  0.49<L>    Ca    8.2<L>      17 Oct 2017 06:57      Creatinine Trend: 0.49 <--, 0.63 <--, 0.54 <--                        10.6   3.6   )-----------( 295      ( 17 Oct 2017 06:57 )             30.6     Urine Studies:              PT/INR - ( 16 Oct 2017 06:13 )   PT: 12.0 sec;   INR: 1.11 ratio           Imaging Personally Reviewed:    Consultant(s) Notes Reviewed:      Care Discussed with Consultants/Other Providers:

## 2017-10-18 ENCOUNTER — TRANSCRIPTION ENCOUNTER (OUTPATIENT)
Age: 67
End: 2017-10-18

## 2017-10-18 LAB
ANION GAP SERPL CALC-SCNC: 8 MMOL/L — SIGNIFICANT CHANGE UP (ref 5–17)
BUN SERPL-MCNC: 15 MG/DL — SIGNIFICANT CHANGE UP (ref 7–23)
CALCIUM SERPL-MCNC: 7.7 MG/DL — LOW (ref 8.4–10.5)
CHLORIDE SERPL-SCNC: 101 MMOL/L — SIGNIFICANT CHANGE UP (ref 96–108)
CO2 SERPL-SCNC: 27 MMOL/L — SIGNIFICANT CHANGE UP (ref 22–31)
CREAT SERPL-MCNC: 0.53 MG/DL — SIGNIFICANT CHANGE UP (ref 0.5–1.3)
GLUCOSE SERPL-MCNC: 93 MG/DL — SIGNIFICANT CHANGE UP (ref 70–99)
GRAM STN FLD: SIGNIFICANT CHANGE UP
GRAM STN FLD: SIGNIFICANT CHANGE UP
HCT VFR BLD CALC: 31.4 % — LOW (ref 39–50)
HGB BLD-MCNC: 10.6 G/DL — LOW (ref 13–17)
MAGNESIUM SERPL-MCNC: 2.1 MG/DL — SIGNIFICANT CHANGE UP (ref 1.6–2.6)
MCHC RBC-ENTMCNC: 30.6 PG — SIGNIFICANT CHANGE UP (ref 27–34)
MCHC RBC-ENTMCNC: 33.8 GM/DL — SIGNIFICANT CHANGE UP (ref 32–36)
MCV RBC AUTO: 90.5 FL — SIGNIFICANT CHANGE UP (ref 80–100)
NIGHT BLUE STAIN TISS: SIGNIFICANT CHANGE UP
PHOSPHATE SERPL-MCNC: 2.9 MG/DL — SIGNIFICANT CHANGE UP (ref 2.5–4.5)
PLATELET # BLD AUTO: 299 K/UL — SIGNIFICANT CHANGE UP (ref 150–400)
POTASSIUM SERPL-MCNC: 4.3 MMOL/L — SIGNIFICANT CHANGE UP (ref 3.5–5.3)
POTASSIUM SERPL-SCNC: 4.3 MMOL/L — SIGNIFICANT CHANGE UP (ref 3.5–5.3)
RBC # BLD: 3.47 M/UL — LOW (ref 4.2–5.8)
RBC # FLD: 16.4 % — HIGH (ref 10.3–14.5)
SODIUM SERPL-SCNC: 136 MMOL/L — SIGNIFICANT CHANGE UP (ref 135–145)
SPECIMEN SOURCE: SIGNIFICANT CHANGE UP
WBC # BLD: 5.8 K/UL — SIGNIFICANT CHANGE UP (ref 3.8–10.5)
WBC # FLD AUTO: 5.8 K/UL — SIGNIFICANT CHANGE UP (ref 3.8–10.5)

## 2017-10-18 PROCEDURE — 71010: CPT | Mod: 26

## 2017-10-18 PROCEDURE — 99233 SBSQ HOSP IP/OBS HIGH 50: CPT

## 2017-10-18 RX ADMIN — HEPARIN SODIUM 5000 UNIT(S): 5000 INJECTION INTRAVENOUS; SUBCUTANEOUS at 16:08

## 2017-10-18 RX ADMIN — Medication 100 MILLIGRAM(S): at 07:05

## 2017-10-18 RX ADMIN — Medication 2.5 MILLIGRAM(S): at 05:33

## 2017-10-18 RX ADMIN — BICALUTAMIDE 150 MILLIGRAM(S): 50 TABLET, FILM COATED ORAL at 11:17

## 2017-10-18 RX ADMIN — HEPARIN SODIUM 5000 UNIT(S): 5000 INJECTION INTRAVENOUS; SUBCUTANEOUS at 21:39

## 2017-10-18 RX ADMIN — Medication 2.5 MILLIGRAM(S): at 18:11

## 2017-10-18 RX ADMIN — Medication 1000 UNIT(S): at 11:16

## 2017-10-18 RX ADMIN — HEPARIN SODIUM 5000 UNIT(S): 5000 INJECTION INTRAVENOUS; SUBCUTANEOUS at 05:33

## 2017-10-18 RX ADMIN — Medication 1 TABLET(S): at 11:16

## 2017-10-18 RX ADMIN — Medication 100 MILLIGRAM(S): at 16:04

## 2017-10-18 NOTE — PROGRESS NOTE ADULT - SUBJECTIVE AND OBJECTIVE BOX
chief complaint : shortness of breath         SUBJECTIVE / OVERNIGHT EVENTS: s/p chest tube placement on left side, pt denies chest pain,sob, nausea, v, abd pain     MEDICATIONS  (STANDING):  bicalutamide 150 milliGRAM(s) Oral daily  calcium carbonate 1250 mG + Vitamin D (OsCal 500 + D) 1 Tablet(s) Oral daily  cholecalciferol 1000 Unit(s) Oral daily  dronabinol 2.5 milliGRAM(s) Oral two times a day  heparin  Injectable 5000 Unit(s) SubCutaneous every 8 hours  influenza   Vaccine 0.5 milliLiter(s) IntraMuscular once  lactated ringers. 1000 milliLiter(s) (75 mL/Hr) IV Continuous <Continuous>    MEDICATIONS  (PRN):  sodium chloride 0.65% Nasal 1 Spray(s) Both Nostrils two times a day PRN Nasal Congestion    Vital Signs Last 24 Hrs  T(C): 36.6 (18 Oct 2017 12:30), Max: 37.1 (17 Oct 2017 22:30)  T(F): 97.9 (18 Oct 2017 12:30), Max: 98.7 (17 Oct 2017 22:30)  HR: 84 (18 Oct 2017 12:30) (74 - 106)  BP: 92/71 (18 Oct 2017 12:30) (92/71 - 115/78)  BP(mean): 92 (17 Oct 2017 21:30) (80 - 94)  RR: 18 (18 Oct 2017 12:30) (11 - 22)  SpO2: 96% (18 Oct 2017 12:30) (96% - 100%)    Constitutional: No fever, fatigue  Skin: No rash.  Eyes: No recent vision problems or eye pain.  ENT: No congestion, ear pain, or sore throat.  Cardiovascular: No chest pain or palpation.  Respiratory: No cough, shortness of breath, congestion, or wheezing.  Gastrointestinal: No abdominal pain, nausea, vomiting, or diarrhea.  Genitourinary: No dysuria.  Musculoskeletal: No joint swelling.  Neurologic: No headache.    PHYSICAL EXAM:  GENERAL: NAD  EYES: EOMI, PERRLA  NECK: Supple, No JVD  CHEST/LUNG: dec breath sounds at bases   HEART:  S1 , S2 +  ABDOMEN: soft, bs+  EXTREMITIES:  no edema  NEUROLOGY: alert awake oriented   chest tube +    LABS:  10-18    136  |  101  |  15  ----------------------------<  93  4.3   |  27  |  0.53    Ca    7.7<L>      18 Oct 2017 06:58  Phos  2.9     10-18  Mg     2.1     10-18      Creatinine Trend: 0.53 <--, 0.49 <--, 0.63 <--, 0.54 <--                        10.6   5.8   )-----------( 299      ( 18 Oct 2017 06:58 )             31.4     Urine Studies:                       Imaging Personally Reviewed:    Consultant(s) Notes Reviewed:      Care Discussed with Consultants/Other Providers:

## 2017-10-18 NOTE — PROGRESS NOTE ADULT - ASSESSMENT
67 M Hx prostate cancer.   POD #1 R pleurx.  Also, had a left PTC placed for left pleural effusion.  He is feeling less SOB.   DW Dr Humphries.  Will observe left PTC output over the next 2 days.  If output decreased, will consider removing the left PTC and if reaccumulates, place Left pleurx.   Cap right pleurx in next 1-2 days and drain 3x weekly or prn

## 2017-10-18 NOTE — PROGRESS NOTE ADULT - SUBJECTIVE AND OBJECTIVE BOX
[facility] -- Kinjal Clifford, Charley Santizo Pannella, Patel, Savella  Office # [phone]      Follow Up:  pulm edema    Subjective/Observations: Patient seen and examined. Events noted. Resting comfortably in bed. No complaints of chest pain, dyspnea, or palpitations reported. 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 1000 milliLiter(s) (75 mL/Hr) IV Continuous <Continuous>    MEDICATIONS  (PRN):  sodium chloride 0.65% Nasal 1 Spray(s) Both Nostrils two times a day PRN Nasal Congestion      Allergies    No Known Allergies    Intolerances            Vital Signs Last 24 Hrs  T(C): 36.8 (18 Oct 2017 04:56), Max: 37.1 (17 Oct 2017 22:30)  T(F): 98.2 (18 Oct 2017 04:56), Max: 98.7 (17 Oct 2017 22:30)  HR: 74 (18 Oct 2017 04:56) (68 - 106)  BP: 108/75 (18 Oct 2017 04:56) (98/72 - 119/84)  BP(mean): 92 (17 Oct 2017 21:30) (80 - 95)  RR: 18 (18 Oct 2017 04:56) (11 - 22)  SpO2: 99% (18 Oct 2017 04:56) (96% - 100%)    I&O's Summary    17 Oct 2017 07:01  -  18 Oct 2017 07:00  --------------------------------------------------------  IN: 470 mL / OUT: 840 mL / NET: -370 mL          PHYSICAL EXAM:  TELE: SR, brief PAT for 3 secs  Constitutional: NAD, awake and alert, well-developed  HEENT: Moist Mucous Membranes, Anicteric  Pulmonary: Decreased breath sounds b/l at bases  Cardiovascular: Regular, S1 and S2, No murmurs, rubs, gallops or clicks  Gastrointestinal: Bowel Sounds present, soft, nontender.   Lymph: No peripheral edema. No lymphadenopathy.  Skin: + chest tube  Psych:  Mood & affect appropriate    LABS: All Labs Reviewed:                        10.6   5.8   )-----------( 299      ( 18 Oct 2017 06:58 )             31.4                         10.6   3.6   )-----------( 295      ( 17 Oct 2017 06:57 )             30.6                         10.1   3.6   )-----------( 316      ( 16 Oct 2017 06:13 )             32.1     18 Oct 2017 06:58    136    |  101    |  15     ----------------------------<  93     4.3     |  27     |  0.53   17 Oct 2017 06:57    139    |  102    |  19     ----------------------------<  83     3.7     |  26     |  0.49     Ca    7.7        18 Oct 2017 06:58  Ca    8.2        17 Oct 2017 06:57  Phos  2.9       18 Oct 2017 06:58  Mg     2.1       18 Oct 2017 06:58

## 2017-10-18 NOTE — PROGRESS NOTE ADULT - SUBJECTIVE AND OBJECTIVE BOX
POD# 1  Right VATS Drainage of Eff.  PLEURX CATHETER      Subjective:  feels less SOB.  CO pain at pleurx site when he moves                      MEDICATIONS  bicalutamide 150 milliGRAM(s) Oral daily  calcium carbonate 1250 mG + Vitamin D (OsCal 500 + D) 1 Tablet(s) Oral daily  ceFAZolin   IVPB 2000 milliGRAM(s) IV Intermittent every 8 hours  cholecalciferol 1000 Unit(s) Oral daily  dronabinol 2.5 milliGRAM(s) Oral two times a day  heparin  Injectable 5000 Unit(s) SubCutaneous every 8 hours  influenza   Vaccine 0.5 milliLiter(s) IntraMuscular once        Vital Signs Last 24 Hrs  T(C): 36.8 (18 Oct 2017 04:56), Max: 37.1 (17 Oct 2017 22:30)  T(F): 98.2 (18 Oct 2017 04:56), Max: 98.7 (17 Oct 2017 22:30)  HR: 74 (18 Oct 2017 04:56) (68 - 106)  BP: 108/75 (18 Oct 2017 04:56) (98/72 - 119/84)  BP(mean): 92 (17 Oct 2017 21:30) (80 - 95)  RR: 18 (18 Oct 2017 04:56) (11 - 22)  SpO2: 99% (18 Oct 2017 04:56) (96% - 100%)      PHYSICAL EXAM:  Neurology: alert and oriented x 3, nonfocal, no gross deficits  Lungs:  clear to ausc.        CT:  R Pleurx 700 since placed -al         L PTC  360cc/24 hr  -al    CXR:  decreased R eff.  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

## 2017-10-18 NOTE — PROGRESS NOTE ADULT - ASSESSMENT
67 M with history of metastatic prostate cancer to bone, recurrent pleural effusion s/p drainage presents with worsening dyspnea over the last 2-3 days Dyspnea worse with exertion with hydropneumothorax s/p chest tube.    - No further PAF. Had brief PAT. Otherwise he is sinus rhythm.   - Chest discomfort is nonanginal and now 2/2 to chest tube  - Chest tube care per pulm and thoracic, left chest tube to water seal.  - tolerated VATS well  - old echo showed thickened pericardium. EKG with low voltage. Please repeat an echo to make sure now pericardial effusion. His wife also reports that he had a leaky valve in the past.  - Monitor and replete electrolytes. Keep K>4.0 and Mg>2.0.   - Further cardiac workup will depend on clinical course.   - All other workup per primary team. Will followup.

## 2017-10-18 NOTE — PROGRESS NOTE ADULT - SUBJECTIVE AND OBJECTIVE BOX
Interval Events:  s/p right VATS with Pleurx placement yesterday - drained approx 800 mL  Left chest in place on CLWS this morning - drained approx 360 mL since mid-day yesterday  Patient is very frustrated with having two chest tubes  Complains of irritation from tape around the tube  Dyspnea, however, is improved    REVIEW OF SYSTEMS:  Constitutional: [ ] negative [ ] fevers [ ] chills [ ] weight loss [ ] weight gain  HEENT: [ ] negative [ ] dry eyes [ ] eye irritation [ ] postnasal drip [ ] nasal congestion  CV: [ ] negative  [ ] chest pain [ ] orthopnea [ ] palpitations [ ] murmur  Resp: [ ] negative [ ] cough [ ] shortness of breath [ ] dyspnea [ ] wheezing [ ] sputum [ ] hemoptysis  GI: [ ] negative [ ] nausea [ ] vomiting [ ] diarrhea [ ] constipation [ ] abd pain [ ] dysphagia   : [ ] negative [ ] dysuria [ ] nocturia [ ] hematuria [ ] increased urinary frequency  Musculoskeletal: [ ] negative [ ] back pain [ ] myalgias [ ] arthralgias [ ] fracture  Skin: [ ] negative [ ] rash [ ] itch  Neurological: [ ] negative [ ] headache [ ] dizziness [ ] syncope [ ] weakness [ ] numbness  Psychiatric: [ ] negative [ ] anxiety [ ] depression  Endocrine: [ ] negative [ ] diabetes [ ] thyroid problem  Hematologic/Lymphatic: [ ] negative [ ] anemia [ ] bleeding problem  Allergic/Immunologic: [ ] negative [ ] itchy eyes [ ] nasal discharge [ ] hives [ ] angioedema  [x] All other systems negative  [ ] Unable to assess ROS because ________    OBJECTIVE:  ICU Vital Signs Last 24 Hrs  T(C): 36.8 (18 Oct 2017 04:56), Max: 37.1 (17 Oct 2017 22:30)  T(F): 98.2 (18 Oct 2017 04:56), Max: 98.7 (17 Oct 2017 22:30)  HR: 74 (18 Oct 2017 04:56) (68 - 106)  BP: 108/75 (18 Oct 2017 04:56) (98/72 - 119/84)  BP(mean): 92 (17 Oct 2017 21:30) (80 - 95)  ABP: 120/75 (17 Oct 2017 21:30) (116/72 - 128/71)  ABP(mean): 92 (17 Oct 2017 21:30) (87 - 95)  RR: 18 (18 Oct 2017 04:56) (11 - 22)  SpO2: 99% (18 Oct 2017 04:56) (96% - 100%)        10-17 @ 07:01  -  10-18 @ 07:00  --------------------------------------------------------  IN: 470 mL / OUT: 840 mL / NET: -370 mL      CAPILLARY BLOOD GLUCOSE          PHYSICAL EXAM:  General:   HEENT:   Lymph Nodes:  Neck:   Respiratory:   Cardiovascular:   Abdomen:   Extremities:   Skin:   Neurological:  Psychiatry:    HOSPITAL MEDICATIONS:  heparin  Injectable 5000 Unit(s) SubCutaneous every 8 hours    ceFAZolin   IVPB 2000 milliGRAM(s) IV Intermittent every 8 hours          dronabinol 2.5 milliGRAM(s) Oral two times a day      bicalutamide 150 milliGRAM(s) Oral daily      calcium carbonate 1250 mG + Vitamin D (OsCal 500 + D) 1 Tablet(s) Oral daily  cholecalciferol 1000 Unit(s) Oral daily  lactated ringers. 1000 milliLiter(s) IV Continuous <Continuous>    influenza   Vaccine 0.5 milliLiter(s) IntraMuscular once    sodium chloride 0.65% Nasal 1 Spray(s) Both Nostrils two times a day PRN        LABS:                        10.6   5.8   )-----------( 299      ( 18 Oct 2017 06:58 )             31.4     Hgb Trend: 10.6<--, 10.6<--, 10.1<--, 11.0<--, 12.1<--  10-17    139  |  102  |  19  ----------------------------<  83  3.7   |  26  |  0.49<L>    Ca    8.2<L>      17 Oct 2017 06:57      Creatinine Trend: 0.49<--, 0.63<--, 0.54<--            MICROBIOLOGY:     RADIOLOGY:  [ ] Reviewed and interpreted by me    PULMONARY FUNCTION TESTS:    EKG:    ASSESSMENT AND RECOMMENDATION    67 M with history of metastatic prostate cancer with bilateral pleural effusions presented with worsening shortness of breath with left hydropneumothorax along with right pleural effusion. He is now s/p left chest tube and right Pleurx placement. Dyspnea improved. Pleural fluid studies indicate exudative process. Cytology from last thoracentesis in August was negative for malignancy; however, given history and recurrent effusions, still likely related to malignancy.    -Will follow up cytology and pleura biopsy results  -Please use supplemental O2 to maintain SpO2 > 92%  -Please change left chest tube to water seal, monitor pleural fluid drainage  -Can clamp the tube once drainage below 100cc/24 hrs and no air leak and repeat imaging in 4-6 hrs to r/o PTX  -Pain control Interval Events:  s/p right VATS with Pleurx placement yesterday - drained approx 800 mL  Left chest in place on CLWS this morning - drained approx 360 mL since mid-day yesterday  Patient is frustrated with having two chest tubes  Complains of irritation from tape around the tube  Dyspnea, however, is improved    REVIEW OF SYSTEMS:  Constitutional: [ ] negative [ ] fevers [ ] chills [ ] weight loss [ ] weight gain  HEENT: [ ] negative [ ] dry eyes [ ] eye irritation [ ] postnasal drip [ ] nasal congestion  CV: [ ] negative  [ ] chest pain [ ] orthopnea [ ] palpitations [ ] murmur  Resp: [ ] negative [ ] cough [ ] shortness of breath [ ] dyspnea [ ] wheezing [ ] sputum [ ] hemoptysis  GI: [ ] negative [ ] nausea [ ] vomiting [ ] diarrhea [ ] constipation [ ] abd pain [ ] dysphagia   : [ ] negative [ ] dysuria [ ] nocturia [ ] hematuria [ ] increased urinary frequency  Musculoskeletal: [ ] negative [ ] back pain [ ] myalgias [ ] arthralgias [ ] fracture  Skin: [ ] negative [ ] rash [ ] itch  Neurological: [ ] negative [ ] headache [ ] dizziness [ ] syncope [ ] weakness [ ] numbness  Psychiatric: [ ] negative [ ] anxiety [ ] depression  Endocrine: [ ] negative [ ] diabetes [ ] thyroid problem  Hematologic/Lymphatic: [ ] negative [ ] anemia [ ] bleeding problem  Allergic/Immunologic: [ ] negative [ ] itchy eyes [ ] nasal discharge [ ] hives [ ] angioedema  [x] All other systems negative  [ ] Unable to assess ROS because ________    OBJECTIVE:  ICU Vital Signs Last 24 Hrs  T(C): 36.8 (18 Oct 2017 04:56), Max: 37.1 (17 Oct 2017 22:30)  T(F): 98.2 (18 Oct 2017 04:56), Max: 98.7 (17 Oct 2017 22:30)  HR: 74 (18 Oct 2017 04:56) (68 - 106)  BP: 108/75 (18 Oct 2017 04:56) (98/72 - 119/84)  BP(mean): 92 (17 Oct 2017 21:30) (80 - 95)  ABP: 120/75 (17 Oct 2017 21:30) (116/72 - 128/71)  ABP(mean): 92 (17 Oct 2017 21:30) (87 - 95)  RR: 18 (18 Oct 2017 04:56) (11 - 22)  SpO2: 99% (18 Oct 2017 04:56) (96% - 100%)        10-17 @ 07:01  -  10-18 @ 07:00  --------------------------------------------------------  IN: 470 mL / OUT: 840 mL / NET: -370 mL      CAPILLARY BLOOD GLUCOSE          PHYSICAL EXAM:  General:   HEENT:   Lymph Nodes:  Neck:   Respiratory:   Cardiovascular:   Abdomen:   Extremities:   Skin:   Neurological:  Psychiatry:    HOSPITAL MEDICATIONS:  heparin  Injectable 5000 Unit(s) SubCutaneous every 8 hours    ceFAZolin   IVPB 2000 milliGRAM(s) IV Intermittent every 8 hours          dronabinol 2.5 milliGRAM(s) Oral two times a day      bicalutamide 150 milliGRAM(s) Oral daily      calcium carbonate 1250 mG + Vitamin D (OsCal 500 + D) 1 Tablet(s) Oral daily  cholecalciferol 1000 Unit(s) Oral daily  lactated ringers. 1000 milliLiter(s) IV Continuous <Continuous>    influenza   Vaccine 0.5 milliLiter(s) IntraMuscular once    sodium chloride 0.65% Nasal 1 Spray(s) Both Nostrils two times a day PRN        LABS:                        10.6   5.8   )-----------( 299      ( 18 Oct 2017 06:58 )             31.4     Hgb Trend: 10.6<--, 10.6<--, 10.1<--, 11.0<--, 12.1<--  10-17    139  |  102  |  19  ----------------------------<  83  3.7   |  26  |  0.49<L>    Ca    8.2<L>      17 Oct 2017 06:57      Creatinine Trend: 0.49<--, 0.63<--, 0.54<--            MICROBIOLOGY:     RADIOLOGY:  [ ] Reviewed and interpreted by me    PULMONARY FUNCTION TESTS:    EKG:    ASSESSMENT AND RECOMMENDATION    67 M with history of metastatic prostate cancer with bilateral pleural effusions presented with worsening shortness of breath with left hydropneumothorax along with right pleural effusion. He is now s/p left chest tube and right Pleurx placement. Dyspnea improved. Pleural fluid studies indicate exudative process. Cytology from last thoracentesis in August was negative for malignancy; however, given history and recurrent effusions, still likely related to malignancy.    -Will follow up cytology and pleura biopsy results  -Please use supplemental O2 to maintain SpO2 > 92%  -Please change left chest tube to water seal, monitor pleural fluid drainage  -Can clamp the tube once drainage below 100cc/24 hrs and no air leak and repeat imaging in 4-6 hrs to r/o PTX  -Pain control Interval Events:  s/p right VATS with Pleurx placement yesterday - drained approx 800 mL  Left chest in place on CLWS this morning - drained approx 360 mL since mid-day yesterday  Patient is frustrated with having two chest tubes  Complains of irritation from tape around the tube  Dyspnea, however, is improved    REVIEW OF SYSTEMS:  Constitutional: [ ] negative [ ] fevers [ ] chills [ ] weight loss [ ] weight gain  HEENT: [ ] negative [ ] dry eyes [ ] eye irritation [ ] postnasal drip [ ] nasal congestion  CV: [ ] negative  [ ] chest pain [ ] orthopnea [ ] palpitations [ ] murmur  Resp: [ ] negative [ ] cough [ ] shortness of breath [ ] dyspnea [ ] wheezing [ ] sputum [ ] hemoptysis  GI: [ ] negative [ ] nausea [ ] vomiting [ ] diarrhea [ ] constipation [ ] abd pain [ ] dysphagia   : [ ] negative [ ] dysuria [ ] nocturia [ ] hematuria [ ] increased urinary frequency  Musculoskeletal: [ ] negative [ ] back pain [ ] myalgias [ ] arthralgias [ ] fracture  Skin: [ ] negative [ ] rash [ ] itch  Neurological: [ ] negative [ ] headache [ ] dizziness [ ] syncope [ ] weakness [ ] numbness  Psychiatric: [ ] negative [ ] anxiety [ ] depression  Endocrine: [ ] negative [ ] diabetes [ ] thyroid problem  Hematologic/Lymphatic: [ ] negative [ ] anemia [ ] bleeding problem  Allergic/Immunologic: [ ] negative [ ] itchy eyes [ ] nasal discharge [ ] hives [ ] angioedema  [x] All other systems negative  [ ] Unable to assess ROS because ________    OBJECTIVE:  ICU Vital Signs Last 24 Hrs  T(C): 36.8 (18 Oct 2017 04:56), Max: 37.1 (17 Oct 2017 22:30)  T(F): 98.2 (18 Oct 2017 04:56), Max: 98.7 (17 Oct 2017 22:30)  HR: 74 (18 Oct 2017 04:56) (68 - 106)  BP: 108/75 (18 Oct 2017 04:56) (98/72 - 119/84)  BP(mean): 92 (17 Oct 2017 21:30) (80 - 95)  ABP: 120/75 (17 Oct 2017 21:30) (116/72 - 128/71)  ABP(mean): 92 (17 Oct 2017 21:30) (87 - 95)  RR: 18 (18 Oct 2017 04:56) (11 - 22)  SpO2: 99% (18 Oct 2017 04:56) (96% - 100%)        10-17 @ 07:01  -  10-18 @ 07:00  --------------------------------------------------------  IN: 470 mL / OUT: 840 mL / NET: -370 mL      CAPILLARY BLOOD GLUCOSE          PHYSICAL EXAM:  General: Awake, alert, oriented X 3.   Neck: No JVD.   Respiratory: Left CT in place, draining serous fluid. Right side with Pleurx, draining serosanguinous fluid. Breath sounds with some crackles at bases bilaterally. No wheezing. No use of accessory muscles.  Cardiovascular: S1 S2 normal. No murmurs, rubs or gallops.   Abdomen: Soft, non-tender, non-distended.  Extremities: Warm to touch. Peripheral pulse palpable. No pedal edema.   Skin: No rashes or skin lesions    HOSPITAL MEDICATIONS:  heparin  Injectable 5000 Unit(s) SubCutaneous every 8 hours    ceFAZolin   IVPB 2000 milliGRAM(s) IV Intermittent every 8 hours          dronabinol 2.5 milliGRAM(s) Oral two times a day      bicalutamide 150 milliGRAM(s) Oral daily      calcium carbonate 1250 mG + Vitamin D (OsCal 500 + D) 1 Tablet(s) Oral daily  cholecalciferol 1000 Unit(s) Oral daily  lactated ringers. 1000 milliLiter(s) IV Continuous <Continuous>    influenza   Vaccine 0.5 milliLiter(s) IntraMuscular once    sodium chloride 0.65% Nasal 1 Spray(s) Both Nostrils two times a day PRN        LABS:                        10.6   5.8   )-----------( 299      ( 18 Oct 2017 06:58 )             31.4     Hgb Trend: 10.6<--, 10.6<--, 10.1<--, 11.0<--, 12.1<--  10-17    139  |  102  |  19  ----------------------------<  83  3.7   |  26  |  0.49<L>    Ca    8.2<L>      17 Oct 2017 06:57      Creatinine Trend: 0.49<--, 0.63<--, 0.54<--            MICROBIOLOGY:     RADIOLOGY:  [ ] Reviewed and interpreted by me    PULMONARY FUNCTION TESTS:    EKG:    ASSESSMENT AND RECOMMENDATION    67 M with history of metastatic prostate cancer with bilateral pleural effusions presented with worsening shortness of breath with left hydropneumothorax along with right pleural effusion. He is now s/p left chest tube and right Pleurx placement. Dyspnea improved. Pleural fluid studies indicate exudative process. Cytology from last thoracentesis in August was negative for malignancy; however, given history and recurrent effusions, still likely related to malignancy.    -Will follow up cytology and pleura biopsy results  -Please use supplemental O2 to maintain SpO2 > 92%  -Please change left chest tube to water seal, monitor pleural fluid drainage  -Can clamp the tube once drainage below 100cc/24 hrs and no air leak and repeat imaging in 4-6 hrs to r/o PTX  -Pain control

## 2017-10-19 LAB
ANION GAP SERPL CALC-SCNC: 8 MMOL/L — SIGNIFICANT CHANGE UP (ref 5–17)
BUN SERPL-MCNC: 14 MG/DL — SIGNIFICANT CHANGE UP (ref 7–23)
CALCIUM SERPL-MCNC: 8.2 MG/DL — LOW (ref 8.4–10.5)
CHLORIDE SERPL-SCNC: 100 MMOL/L — SIGNIFICANT CHANGE UP (ref 96–108)
CO2 SERPL-SCNC: 28 MMOL/L — SIGNIFICANT CHANGE UP (ref 22–31)
CREAT SERPL-MCNC: 0.4 MG/DL — LOW (ref 0.5–1.3)
CULTURE RESULTS: SIGNIFICANT CHANGE UP
GLUCOSE SERPL-MCNC: 87 MG/DL — SIGNIFICANT CHANGE UP (ref 70–99)
HCT VFR BLD CALC: 28.8 % — LOW (ref 39–50)
HGB BLD-MCNC: 10.1 G/DL — LOW (ref 13–17)
MCHC RBC-ENTMCNC: 31.6 PG — SIGNIFICANT CHANGE UP (ref 27–34)
MCHC RBC-ENTMCNC: 35.1 GM/DL — SIGNIFICANT CHANGE UP (ref 32–36)
MCV RBC AUTO: 90 FL — SIGNIFICANT CHANGE UP (ref 80–100)
NON-GYNECOLOGICAL CYTOLOGY STUDY: SIGNIFICANT CHANGE UP
PLATELET # BLD AUTO: 272 K/UL — SIGNIFICANT CHANGE UP (ref 150–400)
POTASSIUM SERPL-MCNC: 3.8 MMOL/L — SIGNIFICANT CHANGE UP (ref 3.5–5.3)
POTASSIUM SERPL-SCNC: 3.8 MMOL/L — SIGNIFICANT CHANGE UP (ref 3.5–5.3)
RBC # BLD: 3.2 M/UL — LOW (ref 4.2–5.8)
RBC # FLD: 16 % — HIGH (ref 10.3–14.5)
SODIUM SERPL-SCNC: 136 MMOL/L — SIGNIFICANT CHANGE UP (ref 135–145)
SPECIMEN SOURCE: SIGNIFICANT CHANGE UP
WBC # BLD: 4.5 K/UL — SIGNIFICANT CHANGE UP (ref 3.8–10.5)
WBC # FLD AUTO: 4.5 K/UL — SIGNIFICANT CHANGE UP (ref 3.8–10.5)

## 2017-10-19 PROCEDURE — 99222 1ST HOSP IP/OBS MODERATE 55: CPT | Mod: GC

## 2017-10-19 PROCEDURE — 71010: CPT | Mod: 26

## 2017-10-19 PROCEDURE — 99233 SBSQ HOSP IP/OBS HIGH 50: CPT

## 2017-10-19 RX ADMIN — HEPARIN SODIUM 5000 UNIT(S): 5000 INJECTION INTRAVENOUS; SUBCUTANEOUS at 13:14

## 2017-10-19 RX ADMIN — Medication 2.5 MILLIGRAM(S): at 17:18

## 2017-10-19 RX ADMIN — HEPARIN SODIUM 5000 UNIT(S): 5000 INJECTION INTRAVENOUS; SUBCUTANEOUS at 21:09

## 2017-10-19 RX ADMIN — BICALUTAMIDE 150 MILLIGRAM(S): 50 TABLET, FILM COATED ORAL at 13:13

## 2017-10-19 RX ADMIN — Medication 1000 UNIT(S): at 13:13

## 2017-10-19 RX ADMIN — Medication 2.5 MILLIGRAM(S): at 05:59

## 2017-10-19 RX ADMIN — Medication 1 TABLET(S): at 13:13

## 2017-10-19 RX ADMIN — HEPARIN SODIUM 5000 UNIT(S): 5000 INJECTION INTRAVENOUS; SUBCUTANEOUS at 05:59

## 2017-10-19 NOTE — CONSULT NOTE ADULT - ATTENDING COMMENTS
Pt has metastatic castrate resistant prostate cancer who has been through multiple lines of treatment including prior provenge, abiraterone, enzalutamide, and most recently chemotherapy. He remains on goserelin with denosumab injections: next due around October 25th. We explained our opinion in terms of chemotherapy: he had reaction to carboplatin and most likely has progressed on the carbo/ avastin/ taxane regimen. We did not agree with lapatinib which is not standard of care therapy off clinical trial for prostate cancer. We reviewed continuation of goserelin/ denosumab. He has been on bicalutamide for 1 year and should have testosterone rechecked. He has been treated by private oncologist in [place], oncologist at [facility] and most recently [place] oncologist: Dr Davis who seems to be giving him more unconventional treatment with avastin which they are paying out of pocket for.  We explained to him and his wife that he could establish care at [facility] but we would not continue this unconventional treatment. It is unclear if he had cabazitaxel and they are convinced that steroids would poison him according to prior studies done at this [facility].     Currently we have encouraged he concentrate on supportive care with the pleural effusion and appetite with the support of Marinol. He can be seen at the Center where he can receive the goserelin and denosumab. Currently PS is 2 but due to pleurex draining.
Agree with above. Patient seen and examined. No changes to past medical history.  Prostate cancer on chemotherapy from a private Oncologist in [place] who has been giving treatment based on genetic profile - most recently received Taxotere - oncology followup  Hydropneumothorax on CT chest - moderate pleural effusion with bubbling in setting of acute SOB and chest pain - will place chest tube. Will likely need a repeat CT chest this coming week once effusion has drained and as air resorbs. If no resolution may need CTS evaluation  Extensive discussion with family - ACP 15 minutes - remains full code is hopeful for continued aggressive chemotherapy  Will send pleural fluid samples for analysis - the left side has not been drained prior - unclear what caused the hydropneumothorax  Will follow  DVT proph

## 2017-10-19 NOTE — CONSULT NOTE ADULT - SUBJECTIVE AND OBJECTIVE BOX
Oncology Consult Note    HPI:  66 y/o M with hx of stage IV adenocarcinoma of the prostate (Candido 8 in 3/6 cores on diagnosis) diagnosed in November 2012, managed in [place], initially locally advanced with enlarged L inguinal lymph node biopsy proven to be adenocarcinoma on presentation, initial bone scan with non-specific findings, s/p ADT (goserelin/bicalutamide) with XRT (completed June 2013), with PSA brennon reaching 0.2 in March 2014. PSA santiago to 4.3 in Sept 2014 and bone scan showed new uptake in R scapula (was having pain there). Scans at that time were also showing new lung nodules and mediastinal adenopathy. From 11/2014-12/2014 received Provenge (sipuleucel-T) and then was started on Xtandi in 12/2014. Patient had stable disease until 10/2016 when had progression of disease in his bones along with a rising PSA. In 11/2016 his PSA was 49.06, santiago to 156.4 in December 2016. At that point he started single agent docetaxel with continued rise of PSA and worsening bone pain. PSA santiago to 346 by 12/27/16 and at that time restarted casodex and was started on dasatinib. His PSA dropped to 212.9 by 1/17/17, and pain gradually improved. Chest imaging at that time showed interval decrease in size of mediastinal nodes and lung nodules, however R axillary nodes and paratracheal nodes were increasing in size and T-spine lesions were increased in intensity. On 2/2 his PSA santiago to 375. He had tumor sequencing which showed BRAF+ and L702H (family told me that they told him he can't have steroids because of that). On 2/15/17 he was started on regimen of carbo/docetaxel 3 weeks on, 1 week off (28 day cycle) with Avastin on day 1. His PSA has dropped to 58.4 by 6/6/2017. He was recently neutropenic and had to have a cycle delayed with neupogen given, and he had an apparently had carbo reaction in his last session and the carbo is to be held with the next cycle.    as per pts wife, pt been having cough/ shortness of breath past few weeks after 2nd pleural tap , flew from [place] 2 days ago    pt was evaluated by pulmonary / Thoracic surgery  chest tube was placed by Pulm (14 Oct 2017 12:27)      Allergies    No Known Allergies    Intolerances        MEDICATIONS  (STANDING):  bicalutamide 150 milliGRAM(s) Oral daily  calcium carbonate 1250 mG + Vitamin D (OsCal 500 + D) 1 Tablet(s) Oral daily  cholecalciferol 1000 Unit(s) Oral daily  dronabinol 2.5 milliGRAM(s) Oral two times a day  heparin  Injectable 5000 Unit(s) SubCutaneous every 8 hours  influenza   Vaccine 0.5 milliLiter(s) IntraMuscular once  lactated ringers. 1000 milliLiter(s) (75 mL/Hr) IV Continuous <Continuous>    MEDICATIONS  (PRN):  sodium chloride 0.65% Nasal 1 Spray(s) Both Nostrils two times a day PRN Nasal Congestion      PAST MEDICAL & SURGICAL HISTORY:  Anemia  Prostate CA: stage IV with metastasis to bone  No significant past surgical history      FAMILY HISTORY:  Family history of breast cancer (Sibling)  Family history of liver cancer: age 40s      SOCIAL HISTORY: No EtOH, no tobacco    REVIEW OF SYSTEMS:    CONSTITUTIONAL: No weakness, fevers or chills  EYES/ENT: No visual changes;  No vertigo or throat pain   NECK: No pain or stiffness  RESPIRATORY: No cough, wheezing, hemoptysis; No shortness of breath  CARDIOVASCULAR: No chest pain or palpitations  GASTROINTESTINAL: No abdominal or epigastric pain. No nausea, vomiting, or hematemesis; No diarrhea or constipation. No melena or hematochezia.  GENITOURINARY: No dysuria, frequency or hematuria  NEUROLOGICAL: No numbness or weakness  SKIN: No itching, burning, rashes, or lesions   All other review of systems is negative unless indicated above.        T(F): 98.1 (10-19-17 @ 13:10), Max: 98.7 (10-19-17 @ 04:37)  HR: 79 (10-19-17 @ 13:10)  BP: 94/62 (10-19-17 @ 13:10)  RR: 18 (10-19-17 @ 13:10)  SpO2: 97% (10-19-17 @ 13:10)  Wt(kg): --    GENERAL: NAD, well-developed  HEAD:  Atraumatic, Normocephalic  EYES: EOMI, PERRLA, conjunctiva and sclera clear  NECK: Supple, No JVD  CHEST/LUNG: Clear to auscultation bilaterally; No wheeze  HEART: Regular rate and rhythm; No murmurs, rubs, or gallops  ABDOMEN: Soft, Nontender, Nondistended; Bowel sounds present  EXTREMITIES:  2+ Peripheral Pulses, No clubbing, cyanosis, or edema  NEUROLOGY: non-focal  SKIN: No rashes or lesions                          10.1   4.5   )-----------( 272      ( 19 Oct 2017 06:57 )             28.8       10-19    136  |  100  |  14  ----------------------------<  87  3.8   |  28  |  0.40<L>    Ca    8.2<L>      19 Oct 2017 06:57  Phos  2.9     10-18  Mg     2.1     10-18 Oncology Consult Note    HPI:  68 y/o M with hx of stage IV adenocarcinoma of the prostate (Candido 8 in 3/6 cores on diagnosis) diagnosed in November 2012, managed in [place], initially locally advanced with enlarged L inguinal lymph node biopsy proven to be adenocarcinoma on presentation, initial bone scan with non-specific findings, s/p ADT (goserelin/bicalutamide) with XRT (completed June 2013), with PSA brennon reaching 0.2 in March 2014. PSA santiago to 4.3 in Sept 2014 and bone scan showed new uptake in R scapula (was having pain there). Scans at that time were also showing new lung nodules and mediastinal adenopathy. From 11/2014-12/2014 received Provenge (sipuleucel-T) and then was started on Xtandi in 12/2014. Patient had stable disease until 10/2016 when had progression of disease in his bones along with a rising PSA. In 11/2016 his PSA was 49.06, santiago to 156.4 in December 2016. At that point he started single agent docetaxel with continued rise of PSA and worsening bone pain. PSA santiago to 346 by 12/27/16 and at that time restarted casodex and was started on dasatinib. His PSA dropped to 212.9 by 1/17/17, and pain gradually improved. Chest imaging at that time showed interval decrease in size of mediastinal nodes and lung nodules, however R axillary nodes and paratracheal nodes were increasing in size and T-spine lesions were increased in intensity. On 2/2 his PSA santiago to 375. He had tumor sequencing which showed BRAF+ and L702H (family told me that they told him he can't have steroids because of that). On 2/15/17 he was started on regimen of carbo/docetaxel 3 weeks on, 1 week off (28 day cycle) with Avastin on day 1. His PSA has dropped to 58.4 by 6/6/2017. He was recently neutropenic and had to have a cycle delayed with neupogen given, and he had an apparently had carbo reaction in his last session and the carbo is to be held with the next cycle.    as per pts wife, pt been having cough/ shortness of breath past few weeks after 2nd pleural tap , flew from [place] 2 days ago    pt was evaluated by pulmonary / Thoracic surgery  chest tube was placed by Pulm (14 Oct 2017 12:27)      Allergies    No Known Allergies    Intolerances        MEDICATIONS  (STANDING):  bicalutamide 150 milliGRAM(s) Oral daily  calcium carbonate 1250 mG + Vitamin D (OsCal 500 + D) 1 Tablet(s) Oral daily  cholecalciferol 1000 Unit(s) Oral daily  dronabinol 2.5 milliGRAM(s) Oral two times a day  heparin  Injectable 5000 Unit(s) SubCutaneous every 8 hours  influenza   Vaccine 0.5 milliLiter(s) IntraMuscular once  lactated ringers. 1000 milliLiter(s) (75 mL/Hr) IV Continuous <Continuous>    MEDICATIONS  (PRN):  sodium chloride 0.65% Nasal 1 Spray(s) Both Nostrils two times a day PRN Nasal Congestion      PAST MEDICAL & SURGICAL HISTORY:  Anemia  Prostate CA: stage IV with metastasis to bone  No significant past surgical history      FAMILY HISTORY:  Family history of breast cancer (Sibling)  Family history of liver cancer: age 40s      SOCIAL HISTORY: No EtOH, no tobacco    REVIEW OF SYSTEMS:    CONSTITUTIONAL: No weakness, fevers or chills  EYES/ENT: No visual changes;  No vertigo or throat pain   NECK: No pain or stiffness  RESPIRATORY: No cough, wheezing, hemoptysis; No shortness of breath  CARDIOVASCULAR: No chest pain or palpitations  GASTROINTESTINAL: No abdominal or epigastric pain. No nausea, vomiting, or hematemesis; No diarrhea or constipation. No melena or hematochezia.  GENITOURINARY: No dysuria, frequency or hematuria  NEUROLOGICAL: No numbness or weakness  SKIN: No itching, burning, rashes, or lesions   All other review of systems is negative unless indicated above.        T(F): 98.1 (10-19-17 @ 13:10), Max: 98.7 (10-19-17 @ 04:37)  HR: 79 (10-19-17 @ 13:10)  BP: 94/62 (10-19-17 @ 13:10)  RR: 18 (10-19-17 @ 13:10)  SpO2: 97% (10-19-17 @ 13:10)  Wt(kg): --    GENERAL: elderly pale patient   HEAD:  Atraumatic, Normocephalic  EYES: EOMI, PERRLA, conjunctiva and sclera clear  NECK: Supple, No JVD  CHEST/LUNG: chest catheter attached to the chest wall.  Lungs sounds present bilateral   HEART: Regular rate and rhythm; No murmurs, rubs, or gallops  ABDOMEN: Soft, Nontender, Nondistended; Bowel sounds present  EXTREMITIES:  2+ Peripheral Pulses, No clubbing, cyanosis, or edema  NEUROLOGY: non-focal  SKIN: No rashes or lesions                          10.1   4.5   )-----------( 272      ( 19 Oct 2017 06:57 )             28.8       10-19    136  |  100  |  14  ----------------------------<  87  3.8   |  28  |  0.40<L>    Ca    8.2<L>      19 Oct 2017 06:57  Phos  2.9     10-18  Mg     2.1     10-18

## 2017-10-19 NOTE — PROGRESS NOTE ADULT - SUBJECTIVE AND OBJECTIVE BOX
chief complaint : shortness of breath         SUBJECTIVE / OVERNIGHT EVENTS: s/p chest tube placement on left side, pt denies chest pain,sob, nausea, v, abd pain     MEDICATIONS  (STANDING):  bicalutamide 150 milliGRAM(s) Oral daily  calcium carbonate 1250 mG + Vitamin D (OsCal 500 + D) 1 Tablet(s) Oral daily  cholecalciferol 1000 Unit(s) Oral daily  dronabinol 2.5 milliGRAM(s) Oral two times a day  heparin  Injectable 5000 Unit(s) SubCutaneous every 8 hours  influenza   Vaccine 0.5 milliLiter(s) IntraMuscular once  lactated ringers. 1000 milliLiter(s) (75 mL/Hr) IV Continuous <Continuous>    MEDICATIONS  (PRN):  sodium chloride 0.65% Nasal 1 Spray(s) Both Nostrils two times a day PRN Nasal Congestion      Vital Signs Last 24 Hrs  T(C): 36.5 (19 Oct 2017 21:10), Max: 37.1 (19 Oct 2017 04:37)  T(F): 97.7 (19 Oct 2017 21:10), Max: 98.7 (19 Oct 2017 04:37)  HR: 81 (19 Oct 2017 21:10) (79 - 82)  BP: 110/73 (19 Oct 2017 21:10) (94/62 - 110/73)  BP(mean): --  RR: 18 (19 Oct 2017 21:10) (18 - 18)  SpO2: 97% (19 Oct 2017 21:10) (96% - 97%)    Constitutional: No fever, fatigue  Skin: No rash.  Eyes: No recent vision problems or eye pain.  ENT: No congestion, ear pain, or sore throat.  Cardiovascular: No chest pain or palpation.  Respiratory: No cough, shortness of breath, congestion, or wheezing.  Gastrointestinal: No abdominal pain, nausea, vomiting, or diarrhea.  Genitourinary: No dysuria.  Musculoskeletal: No joint swelling.  Neurologic: No headache.    PHYSICAL EXAM:  GENERAL: NAD  EYES: EOMI, PERRLA  NECK: Supple, No JVD  CHEST/LUNG: dec breath sounds at bases   HEART:  S1 , S2 +  ABDOMEN: soft, bs+  EXTREMITIES:  no edema  NEUROLOGY: alert awake oriented   chest tube +    LABS:  10-19    136  |  100  |  14  ----------------------------<  87  3.8   |  28  |  0.40<L>    Ca    8.2<L>      19 Oct 2017 06:57  Phos  2.9     10-18  Mg     2.1     10-18      Creatinine Trend: 0.40 <--, 0.53 <--, 0.49 <--, 0.63 <--, 0.54 <--                        10.1   4.5   )-----------( 272      ( 19 Oct 2017 06:57 )             28.8     Urine Studies:                     Imaging Personally Reviewed:    Consultant(s) Notes Reviewed:      Care Discussed with Consultants/Other Providers:

## 2017-10-19 NOTE — PROGRESS NOTE ADULT - SUBJECTIVE AND OBJECTIVE BOX
VITAL SIGNS    Vital Signs Last 24 Hrs  T(C): 36.7 (10-19-17 @ 13:10), Max: 37.1 (10-19-17 @ 04:37)  T(F): 98.1 (10-19-17 @ 13:10), Max: 98.7 (10-19-17 @ 04:37)  HR: 79 (10-19-17 @ 13:10) (79 - 85)  BP: 94/62 (10-19-17 @ 13:10) (94/62 - 109/73)  RR: 18 (10-19-17 @ 13:10) (18 - 18)  SpO2: 97% (10-19-17 @ 13:10) (96% - 98%)            10-18 @ 07:01  -  10-19 @ 07:00  --------------------------------------------------------  IN: 910 mL / OUT: 735 mL / NET: 175 mL    10-19 @ 07:01  -  10-19 @ 16:58  --------------------------------------------------------  IN: 480 mL / OUT: 300 mL / NET: 180 mL    MEDICATIONS  bicalutamide 150 milliGRAM(s) Oral daily  calcium carbonate 1250 mG + Vitamin D (OsCal 500 + D) 1 Tablet(s) Oral daily  cholecalciferol 1000 Unit(s) Oral daily  dronabinol 2.5 milliGRAM(s) Oral two times a day  heparin  Injectable 5000 Unit(s) SubCutaneous every 8 hours  influenza   Vaccine 0.5 milliLiter(s) IntraMuscular once  lactated ringers. 1000 milliLiter(s) IV Continuous <Continuous>  sodium chloride 0.65% Nasal 1 Spray(s) Both Nostrils two times a day PRN      PHYSICAL EXAM    Subjective: NAD  Neurology: alert and oriented x 3, nonfocal, no gross deficits  CV :S1S2  Lungs: diminshed b/l bases right pleurx to pleurovac  -90/650                                           left chest tube to waterseal -70/95  Abdomen: soft, NT,ND, ( )BM  :  voiding  Extremities: -c/c/e       LABS  10-19    136  |  100  |  14  ----------------------------<  87  3.8   |  28  |  0.40<L>    Ca    8.2<L>      19 Oct 2017 06:57  Phos  2.9     10-18  Mg     2.1     10-18                                   10.1   4.5   )-----------( 272      ( 19 Oct 2017 06:57 )             28.8                 PAST MEDICAL & SURGICAL HISTORY:  Anemia  Prostate CA: stage IV with metastasis to bone  No significant past surgical history

## 2017-10-19 NOTE — PROGRESS NOTE ADULT - ASSESSMENT
67 M with history of metastatic prostate cancer to bone, recurrent pleural effusion s/p drainage presents with worsening dyspnea over the last 2-3 days Dyspnea worse with exertion with hydropneumothorax s/p chest tube.    - Chest discomfort is nonanginal and now 2/2 to chest tube  - Chest tube management per pulm  - prior echo showed thickened pericardium. EKG with low voltage. reasonable to repeat echo to evaluate for possible pericardial effusion, noting that a small one was present 8/1.  seems unlikely to be contributing to dyspnea in a significant way  - Monitor and replete electrolytes. Keep K>4.0 and Mg>2.0.   - Further cardiac workup will depend on clinical course.   - All other workup per primary team. Will followup.

## 2017-10-19 NOTE — PROGRESS NOTE ADULT - ASSESSMENT
67 M Hx prostate cancer.   POD #1 R pleurx.  Also, had a left PTC placed for left pleural effusion.  He is feeling less SOB.   DW Dr Humphries.  Will observe left PTC output over the next 2 days.  If output decreased, will consider removing the left PTC and if reaccumulates, place Left pleurx. 67 M Hx prostate cancer.   POD #2 R pleurx.  Also, had a left PTC placed for left pleural effusion.  He is feeling less SOB.   DW Dr Humphries.  Will observe left PTC output over the next 2 days.  If output decreased, will consider removing the left PTC and if reaccumulates, place Left pleurx. 67 M Hx prostate cancer with mets to bone presents with increased SOB and b/l pleural effusion.  Left pigtail placed 10/14/17 . Right VATS pleurx placement performed 10/17/17.

## 2017-10-19 NOTE — CONSULT NOTE ADULT - PROBLEM SELECTOR RECOMMENDATION 9
- Patient with s/p a 14Fr chest tube placement on the left side with drainage of serosanguinous fluid along with air.   - Continue drainage to low suction tonight. Change to water seal if patient experiences persistent chest pain.  - Obtain and follow post-procedure radiography.   - f/u Cytopath and lab results from the pleural fluid.  - Will follow patient through hospital course.  - Continue patient on supplemental oxygen.
Metastatic Prostate cancer on recent taxotere/avastin, casodex and Xgeva  by his oncologist in [place] (Dr Davis). Patient most likely not responding to treatment recurrence and now new pleural effusion. High suspicious for malignant pleural effusion, pending cytology.   -Hold chemotherapy for now, if patient wishes to establish care at [facility], we will send a referral upon discharge.   -Please Obtain records from Dr Carrera in [place].   -Please order testosterone and PSA.

## 2017-10-19 NOTE — PROGRESS NOTE ADULT - SUBJECTIVE AND OBJECTIVE BOX
Interval Events:  No events overnight  Complains of irritation at sites of chest tube / pleurx insertion  No dyspnea  Left chest tube with approx 100 mL drainage since yesterday afternoon; however the drain was placed to LCWS  Changed to left chest tube drainage to water seal at approx 7:30 AM    REVIEW OF SYSTEMS:  Constitutional: [ ] negative [ ] fevers [ ] chills [ ] weight loss [ ] weight gain  HEENT: [ ] negative [ ] dry eyes [ ] eye irritation [ ] postnasal drip [ ] nasal congestion  CV: [ ] negative  [ ] chest pain [ ] orthopnea [ ] palpitations [ ] murmur  Resp: [ ] negative [ ] cough [ ] shortness of breath [ ] dyspnea [ ] wheezing [ ] sputum [ ] hemoptysis  GI: [ ] negative [ ] nausea [ ] vomiting [ ] diarrhea [ ] constipation [ ] abd pain [ ] dysphagia   : [ ] negative [ ] dysuria [ ] nocturia [ ] hematuria [ ] increased urinary frequency  Musculoskeletal: [ ] negative [ ] back pain [ ] myalgias [ ] arthralgias [ ] fracture  Skin: [ ] negative [ ] rash [ ] itch  Neurological: [ ] negative [ ] headache [ ] dizziness [ ] syncope [ ] weakness [ ] numbness  Psychiatric: [ ] negative [ ] anxiety [ ] depression  Endocrine: [ ] negative [ ] diabetes [ ] thyroid problem  Hematologic/Lymphatic: [ ] negative [ ] anemia [ ] bleeding problem  Allergic/Immunologic: [ ] negative [ ] itchy eyes [ ] nasal discharge [ ] hives [ ] angioedema  [x] All other systems negative  [ ] Unable to assess ROS because ________    OBJECTIVE:  ICU Vital Signs Last 24 Hrs  T(C): 37.1 (19 Oct 2017 04:37), Max: 37.1 (19 Oct 2017 04:37)  T(F): 98.7 (19 Oct 2017 04:37), Max: 98.7 (19 Oct 2017 04:37)  HR: 82 (19 Oct 2017 04:37) (82 - 85)  BP: 109/73 (19 Oct 2017 04:37) (92/71 - 109/73)  BP(mean): --  ABP: --  ABP(mean): --  RR: 18 (19 Oct 2017 04:37) (18 - 18)  SpO2: 96% (19 Oct 2017 04:37) (96% - 98%)        10-18 @ 07:01  -  10-19 @ 07:00  --------------------------------------------------------  IN: 910 mL / OUT: 735 mL / NET: 175 mL      CAPILLARY BLOOD GLUCOSE          PHYSICAL EXAM:  General: Awake, alert, oriented X 3.   Neck: No JVD.   Respiratory: Left CT in place, draining serous fluid. Right side with Pleurx, draining serosanguinous fluid. Breath sounds with some crackles at bases bilaterally. No wheezing. No use of accessory muscles.  Cardiovascular: S1 S2 normal. No murmurs, rubs or gallops.   Abdomen: Soft, non-tender, non-distended.  Extremities: Warm to touch. Peripheral pulse palpable. No pedal edema.   Skin: No rashes or skin lesions      HOSPITAL MEDICATIONS:  heparin  Injectable 5000 Unit(s) SubCutaneous every 8 hours            dronabinol 2.5 milliGRAM(s) Oral two times a day      bicalutamide 150 milliGRAM(s) Oral daily      calcium carbonate 1250 mG + Vitamin D (OsCal 500 + D) 1 Tablet(s) Oral daily  cholecalciferol 1000 Unit(s) Oral daily  lactated ringers. 1000 milliLiter(s) IV Continuous <Continuous>    influenza   Vaccine 0.5 milliLiter(s) IntraMuscular once    sodium chloride 0.65% Nasal 1 Spray(s) Both Nostrils two times a day PRN        LABS:                        10.1   4.5   )-----------( 272      ( 19 Oct 2017 06:57 )             28.8     Hgb Trend: 10.1<--, 10.6<--, 10.6<--, 10.1<--, 11.0<--  10-19    136  |  100  |  14  ----------------------------<  87  3.8   |  28  |  0.40<L>    Ca    8.2<L>      19 Oct 2017 06:57  Phos  2.9     10-18  Mg     2.1     10-18      Creatinine Trend: 0.40<--, 0.53<--, 0.49<--, 0.63<--, 0.54<--            MICROBIOLOGY:     RADIOLOGY:  [ ] Reviewed and interpreted by me    PULMONARY FUNCTION TESTS:    EKG:    ASSESSMENT AND RECOMMENDATION    67 M with history of metastatic prostate cancer with bilateral pleural effusions presented with worsening shortness of breath with left hydropneumothorax along with right pleural effusion. He is now s/p left chest tube and right Pleurx placement. Dyspnea improved. Pleural fluid of left side indicate exudative process.    -Will follow up cytology and pleura biopsy results (still pending)  -Please use supplemental O2 to maintain SpO2 > 92%  -Please change left chest tube to water seal, monitor pleural fluid drainage  -Can clamp the tube once drainage below 100cc/24 hrs and no air leak and repeat imaging in 4-6 hrs to r/o PTX  -Pain control

## 2017-10-19 NOTE — PROGRESS NOTE ADULT - SUBJECTIVE AND OBJECTIVE BOX
[facility]    Kinjal Clifford, Darlyn, Charley, Lewis, Garry, Ghulam      [phone]    CHIEF COMPLAINT: Patient is a 67y old  Male who presents with a chief complaint of sob (14 Oct 2017 12:27)      Follow Up: dyspnea, effusions, s/p vats    Interim history: slept poorly (pain), no acute dyspnea    MEDICATIONS  (STANDING):  bicalutamide 150 milliGRAM(s) Oral daily  calcium carbonate 1250 mG + Vitamin D (OsCal 500 + D) 1 Tablet(s) Oral daily  cholecalciferol 1000 Unit(s) Oral daily  dronabinol 2.5 milliGRAM(s) Oral two times a day  heparin  Injectable 5000 Unit(s) SubCutaneous every 8 hours  influenza   Vaccine 0.5 milliLiter(s) IntraMuscular once  lactated ringers. 1000 milliLiter(s) (75 mL/Hr) IV Continuous <Continuous>    MEDICATIONS  (PRN):  sodium chloride 0.65% Nasal 1 Spray(s) Both Nostrils two times a day PRN Nasal Congestion      REVIEW OF SYSTEMS:  eye, ent, GI, , allergic, dermatologic, musculoskeletal and neurologic are negative except as described above    Vital Signs Last 24 Hrs  T(C): 37.1 (19 Oct 2017 04:37), Max: 37.1 (19 Oct 2017 04:37)  T(F): 98.7 (19 Oct 2017 04:37), Max: 98.7 (19 Oct 2017 04:37)  HR: 82 (19 Oct 2017 04:37) (82 - 85)  BP: 109/73 (19 Oct 2017 04:37) (92/71 - 109/73)  BP(mean): --  RR: 18 (19 Oct 2017 04:37) (18 - 18)  SpO2: 96% (19 Oct 2017 04:37) (96% - 98%)    I&O's Summary    18 Oct 2017 07:01  -  19 Oct 2017 07:00  --------------------------------------------------------  IN: 910 mL / OUT: 735 mL / NET: 175 mL        Telemetry past 24h:    PHYSICAL EXAM:    Constitutional: well-nourished, well-developed, NAD   HEENT:  MMM, sclerae anicteric, conjunctivae clear, no oral cyanosis.  Pulmonary: Non-labored, breath sounds are decr bilaterally, crackles right base  Cardiovascular: Regular, S1 and S2.  No murmur.  No rubs, gallops or clicks  Gastrointestinal: Bowel Sounds present, soft, nontender.   Lymph: No peripheral edema.   Neurological: Alert, no focal deficits  Skin: No rashes.  Psych:  Mood & affect appropriate    LABS: All Labs Reviewed:                        10.1   4.5   )-----------( 272      ( 19 Oct 2017 06:57 )             28.8                         10.6   5.8   )-----------( 299      ( 18 Oct 2017 06:58 )             31.4                         10.6   3.6   )-----------( 295      ( 17 Oct 2017 06:57 )             30.6     19 Oct 2017 06:57    136    |  100    |  14     ----------------------------<  87     3.8     |  28     |  0.40   18 Oct 2017 06:58    136    |  101    |  15     ----------------------------<  93     4.3     |  27     |  0.53   17 Oct 2017 06:57    139    |  102    |  19     ----------------------------<  83     3.7     |  26     |  0.49     Ca    8.2        19 Oct 2017 06:57  Ca    7.7        18 Oct 2017 06:58  Ca    8.2        17 Oct 2017 06:57  Phos  2.9       18 Oct 2017 06:58  Mg     2.1       18 Oct 2017 06:58            Blood Culture: Organism --  Gram Stain Blood -- Gram Stain   No polymorphonuclear cells seen per low power field  No organisms seen per oil power field  Specimen Source .Tissue Other, right pleura  Culture-Blood --    Organism --  Gram Stain Blood -- Gram Stain   polymorphonuclear leukocytes seen per low power field  No organisms seen per oil power field  by cytocentrifuge  Specimen Source .Body Fluid Pleural Fluid  Culture-Blood --    Organism --  Gram Stain Blood -- Gram Stain   polymorphonuclear leukocytes seen  No organisms seen  by cytocentrifuge  Specimen Source Pleural Fl Pleural Fluid  Culture-Blood --            RADIOLOGY:    EKG:    Echo:    < from: Transthoracic Echocardiogram (08.01.17 @ 23:07) >    Patient name: MALVIN FISCHER  YOB: 1950   Age: 67 (M)   MR#: [id number]  Study Date: 8/1/2017  [facility]  Study quality: Technically difficult  Referring Physician: Michael Alvarez MD  Blood Pressure: 113/79 mmHg  Height: 175 cm  Weight: 86 kg  BSA: 2 m2  ------------------------------------------------------------------------  PROCEDURE: Transthoracic echocardiogram with 2-D, M-Mode  and complete spectral and color flow Doppler. Strain  Imaging.  INDICATION: Dyspnea, unspecified (R06.00)  ------------------------------------------------------------------------  Dimensions:    Normal Values:  LA:     3.0    2.0 - 4.0 cm  Ao:     4.0    2.0 - 3.8 cm  SEPTUM: 1.1    0.6 - 1.2 cm  PWT:    0.9    0.6 - 1.1 cm  LVIDd:  4.7    3.0 - 5.6 cm  LVIDs:  3.3    1.8 - 4.0 cm  Derived variables:  LVMI: 81 g/m2  RWT: 0.38  Doppler Peak Velocity (m/sec): AoV=1.4  ------------------------------------------------------------------------  Observations:  Mitral Valve: Mild mitral annular calcification, otherwise  normal mitral valve. Minimal mitral regurgitation.  Aortic Valve/Aorta: Aortic valve not well visualized;  appears to be a calcified trileaflet valve with normal  opening. Peak transaortic valve gradient equals 8 mm Hg,  mean transaortic valve gradient equals 5 mm Hg, aortic  valve velocity time integral equals 29 cm. Mild-moderate  aortic regurgitation. Mean gradient is equal to 2 mm Hg,  LVOT velocity time integral equals 19 cm.  Aortic Root: 4 cm.  LVOT diameter: 2.5 cm.  Left Atrium: Left atrium not well visualized, probably  normal.  Left Ventricle: Endocardium not well visualized; grossly  normal left ventricular systolic function. Consider limited  repeat imaging with intravenous echo contrast to better  assess the LV if clinically indicated. Normal left  ventricular internal dimensions and wall thicknesses. Mild  diastolic dysfunction (Stage I).  Right Heart: Right atrium not well visualized, probably  normal. The right ventricle is not well visualized; grossly  normal right ventricular systolic function. Tricuspid valve  not well visualized, probably normal. Minimal tricuspid  regurgitation. Pulmonic valve not well visualized. No  pulmonic regurgitation.  Pericardium/Pleura: Thickened pericardium inferior to the  right ventricle measuring up to approximately 0.8 cm thick.  Small pericardail effusion inferior to the right heart and  adjacent to the apex.  Left pleural effusion.  Hemodynamic: Estimated right atrial pressure is 8 mm Hg.  ------------------------------------------------------------------------  Conclusions:  1. Mild mitral annular calcification, otherwise normal  mitral valve. Minimal mitral regurgitation.  2. Aortic valve not well visualized; appears to be a  calcified trileaflet valve with normal opening.  Mild-moderate aortic regurgitation.  3. Endocardium not well visualized; grossly normal left  ventricular systolic function. Consider limited repeat  imaging with intravenous echo contrast to better assess the  LV if clinically indicated.  4. Global longitudinal strain measurements were technically  difficult due to poor endocardial visualization. Global  longitudinal strain measurements performed on beatlab Epiq  machine at HRabout 90 bpm, AD=803/79 mmHg. Measured  GLS=-18.8%.  5. Mild diastolic dysfunction (Stage I).  6. The right ventricle is not well visualized; grossly  normal right ventricular systolic function.  7. Thickened pericardium inferior to the right ventricle  measuring up to approximately 0.8 cm thick. Small  pericardail effusion inferior to the right heart and  adjacent to the apex.  *** No previous Echo exam.  ------------------------------------------------------------------------  Confirmed on  8/1/2017 -15:59:30 by Jeana Castellanos M.D.  ------------------------------------------------------------------------    < end of copied text >

## 2017-10-19 NOTE — CONSULT NOTE ADULT - ASSESSMENT
68 y/o M with metastatic adenocarcinoma of prostate diagnosed in 2012 s/p ADT and XRT, s/p sipleucel T, Xtandi and Zytiga/prednisone with POD, s/p single agent docetaxel with no activity and now on Avastin/carboplatin/taxotere with dasatinib with excellent response, s/p 8 cycles, now admitted for SOB found with moderate pleural effusion s/p chest tube and now pleurex catheter

## 2017-10-20 ENCOUNTER — TRANSCRIPTION ENCOUNTER (OUTPATIENT)
Age: 67
End: 2017-10-20

## 2017-10-20 LAB
ANION GAP SERPL CALC-SCNC: 8 MMOL/L — SIGNIFICANT CHANGE UP (ref 5–17)
BUN SERPL-MCNC: 11 MG/DL — SIGNIFICANT CHANGE UP (ref 7–23)
CALCIUM SERPL-MCNC: 9 MG/DL — SIGNIFICANT CHANGE UP (ref 8.4–10.5)
CHLORIDE SERPL-SCNC: 100 MMOL/L — SIGNIFICANT CHANGE UP (ref 96–108)
CO2 SERPL-SCNC: 29 MMOL/L — SIGNIFICANT CHANGE UP (ref 22–31)
CREAT SERPL-MCNC: 0.49 MG/DL — LOW (ref 0.5–1.3)
GLUCOSE SERPL-MCNC: 91 MG/DL — SIGNIFICANT CHANGE UP (ref 70–99)
HCT VFR BLD CALC: 33.2 % — LOW (ref 39–50)
HGB BLD-MCNC: 10.8 G/DL — LOW (ref 13–17)
MCHC RBC-ENTMCNC: 29.3 PG — SIGNIFICANT CHANGE UP (ref 27–34)
MCHC RBC-ENTMCNC: 32.6 GM/DL — SIGNIFICANT CHANGE UP (ref 32–36)
MCV RBC AUTO: 90 FL — SIGNIFICANT CHANGE UP (ref 80–100)
PLATELET # BLD AUTO: 318 K/UL — SIGNIFICANT CHANGE UP (ref 150–400)
POTASSIUM SERPL-MCNC: 4.3 MMOL/L — SIGNIFICANT CHANGE UP (ref 3.5–5.3)
POTASSIUM SERPL-SCNC: 4.3 MMOL/L — SIGNIFICANT CHANGE UP (ref 3.5–5.3)
RBC # BLD: 3.69 M/UL — LOW (ref 4.2–5.8)
RBC # FLD: 16.1 % — HIGH (ref 10.3–14.5)
SODIUM SERPL-SCNC: 137 MMOL/L — SIGNIFICANT CHANGE UP (ref 135–145)
WBC # BLD: 4.8 K/UL — SIGNIFICANT CHANGE UP (ref 3.8–10.5)
WBC # FLD AUTO: 4.8 K/UL — SIGNIFICANT CHANGE UP (ref 3.8–10.5)

## 2017-10-20 PROCEDURE — 71010: CPT | Mod: 26

## 2017-10-20 PROCEDURE — 99233 SBSQ HOSP IP/OBS HIGH 50: CPT

## 2017-10-20 RX ADMIN — HEPARIN SODIUM 5000 UNIT(S): 5000 INJECTION INTRAVENOUS; SUBCUTANEOUS at 13:02

## 2017-10-20 RX ADMIN — Medication 1 TABLET(S): at 13:00

## 2017-10-20 RX ADMIN — Medication 1000 UNIT(S): at 13:00

## 2017-10-20 RX ADMIN — Medication 2.5 MILLIGRAM(S): at 06:25

## 2017-10-20 RX ADMIN — HEPARIN SODIUM 5000 UNIT(S): 5000 INJECTION INTRAVENOUS; SUBCUTANEOUS at 21:49

## 2017-10-20 RX ADMIN — HEPARIN SODIUM 5000 UNIT(S): 5000 INJECTION INTRAVENOUS; SUBCUTANEOUS at 06:25

## 2017-10-20 RX ADMIN — BICALUTAMIDE 150 MILLIGRAM(S): 50 TABLET, FILM COATED ORAL at 13:00

## 2017-10-20 RX ADMIN — Medication 2.5 MILLIGRAM(S): at 17:47

## 2017-10-20 NOTE — DISCHARGE NOTE ADULT - CARE PROVIDER_API CALL
Hannah Humphries), Surgery  [address] [address] [place] [address]  Phone: [phone]  Fax: [phone] Hannah Humphries), Surgery  [address] [address] [place] [address]  Phone: [phone]  Fax: [phone]    Cedrick Negro), Hematology; Medical Oncology  [address] [place] [address]  Phone: [phone]  Fax: [phone]

## 2017-10-20 NOTE — DISCHARGE NOTE ADULT - PATIENT PORTAL LINK FT
“You can access the Follow[facility] Patient Portal, offered by [facility], by registering with the following website: [email or web address]”

## 2017-10-20 NOTE — DISCHARGE NOTE ADULT - HOSPITAL COURSE
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  Symptoms worse with exertion and associated with non-productive cough.  The patient was here for similar complains in August and was found to have bilateral pleural effusions, right > left. He underwent a diagnostic and therapeutic thoracentesis of the right side at that time.  Chest imaging showed a moderate partially loculated left hydropneumothorax, which was not present on previous imaging in August, and moderate partially loculated right pleural effusion. Patient was followed by Pulmonary, CT surgery, and cardiology.  Left pigtail was placed 10/14/17 and inadvertlantly dislodged on 10/20.  Right VATS with Pleurx placement was performed 10/17/17; Pleurx was capped on 10/20.  CXR on 10/21 shows no pneumothorax.  Patient cleared for discharge with Pleurx in place - Visiting Nurse to drain on Monday, Wednesday, and Friday - up to one liter each time - and change dressing as needed.  Patient to follow up with Dr. Humphries in 2 weeks.  Patient stable for discharge with home PT and home care. 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  Symptoms worse with exertion and associated with non-productive cough.  The patient was here for similar complains in August and was found to have bilateral pleural effusions, right > left. He underwent a diagnostic and therapeutic thoracentesis of the right side at that time.  Chest imaging showed a moderate partially loculated left hydropneumothorax, which was not present on previous imaging in August, and moderate partially loculated right pleural effusion. Patient was followed by Pulmonary, CT surgery, and cardiology.  Left pigtail was placed 10/14/17 and inadvertlantly dislodged on 10/20.  Right VATS with Pleurx placement was performed 10/17/17; Pleurx was capped on 10/20.  CXR on 10/21 shows no pneumothorax.  Patient cleared for discharge with Pleurx in place - Visiting Nurse to drain on Monday, Wednesday, and Friday - up to one liter each time - and change dressing as needed.  Right pleura biopsy shows metastatic poorly differentiated carcinoma consistent with prostate origin.  Right pleural fluid negative for malignant cells.  Patient to follow up with Dr. Humphries (Thoracic Surgery) in 2 weeks.  Patient to follow up in [facility] in 1 week.  Patient stable for discharge with home PT and home care. 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  Symptoms worse with exertion and associated with non-productive cough.  The patient was here for similar complains in August and was found to have bilateral pleural effusions, right > left. He underwent a diagnostic and therapeutic thoracentesis of the right side at that time.  Chest imaging showed a moderate partially loculated left hydropneumothorax, which was not present on previous imaging in August, and moderate partially loculated right pleural effusion. Patient was followed by Pulmonary, CT surgery, and cardiology.  Left pigtail was placed 10/14/17 and inadvertlantly dislodged on 10/20.  Right VATS with PleurX placement was performed 10/17/17; PleurX was capped on 10/20.  CXR on 10/21 shows no pneumothorax.  Patient cleared for discharge with PleurX in place - Visiting Nurse to drain on Monday, Wednesday, and Friday - up to one liter each time - and change dressing as needed.  Right pleura biopsy shows metastatic poorly differentiated carcinoma consistent with prostate origin.  Right pleural fluid negative for malignant cells.  Patient to follow up with Dr. Humphries (Thoracic Surgery) in 2 weeks.  Patient to follow up in [facility] in 1 week.  Patient stable for discharge with home PT and home care.

## 2017-10-20 NOTE — PROGRESS NOTE ADULT - PROBLEM SELECTOR PLAN 2
poss VATS , pts family still not decided     elevated D dimer -doppler lower ext to r/o DVT
- Thoracic surgery planning for OR for the right PLEF with VATS, pleural biopsy and possible pleurex placement. Further management of the right PLEF per thoracic surgery.
Rt VATS  for lung bx and Rt pleurex placement re-scheduled for Tuesday with Dr. Humphries at 1pm  keep NPO after midnight for procedure  abx on call ordered
npo after midnigth for Rt VATS  for lung bx and Rt pleurex placement  OR schedulde for 2-3 pm  type and cross pending  abx on call ordered
poss VATS , pts family agreed for VATS     elevated D dimer -doppler lower ext to r/o DVT
poss VATS , pts family still not decided     elevated D dimer -doppler lower ext to r/o DVT
s/p VATS     elevated D dimer -doppler lower ext to r/o DVT
s/p VATS     thoracic surgery f/u   stat CXR
s/p VATS     elevated D dimer -doppler lower ext to r/o DVT

## 2017-10-20 NOTE — DISCHARGE NOTE ADULT - MEDICATION SUMMARY - MEDICATIONS TO TAKE
I will START or STAY ON the medications listed below when I get home from the hospital:    iron tablet  -- 1 tab(s) by mouth once a day  -- Indication: For Anemia    dronabinol 2.5 mg oral capsule  -- 1 cap(s) by mouth 2 times a day  -- Indication: For Nausea    bicalutamide 50 mg oral tablet  -- 3 tab(s) by mouth once a day  -- Indication: For Prostate cancer    Xgeva 120 mg/1.7 mL subcutaneous solution  -- 1 dose(s) subcutaneous once a month  -- Indication: For Helps prevent fractures    sodium chloride 0.65% nasal spray  -- 1  into nose 2 times a day, As Needed  -- Indication: For Dry nose    Probiotic Formula oral capsule  -- 1 cap(s) by mouth once a day  -- Indication: For Supplement    Centrum Men's  -- 1 tab(s) by mouth once a day  -- Indication: For Supplement    Calcium 600+D oral tablet  -- 2 tab(s) by mouth once a day  -- Indication: For Supplement    Vitamin D3 2000 intl units oral tablet  -- 1 tab(s) by mouth once a day  -- Indication: For Supplement    cholecalciferol oral tablet  -- 1000 unit(s) by mouth once a day  -- Indication: For Supplement

## 2017-10-20 NOTE — PROGRESS NOTE ADULT - SUBJECTIVE AND OBJECTIVE BOX
[facility] - Kinjal Clifford, Darlyn, Charley, Lewis, Ghulam Castañeda  Office Number:  [phone]    Patient resting comfortably in bed in NAD.  Laying flat with no respiratory distress.  No complaints of chest pain, dyspnea, palpitations, PND, or orthopnea.  s/p l pigtail and right pleurex catheter placement. Breathing much improved    ROS: negative unless otherwise mentioned.    Telemetry:  SR 70-90    MEDICATIONS  (STANDING):  bicalutamide 150 milliGRAM(s) Oral daily  calcium carbonate 1250 mG + Vitamin D (OsCal 500 + D) 1 Tablet(s) Oral daily  cholecalciferol 1000 Unit(s) Oral daily  dronabinol 2.5 milliGRAM(s) Oral two times a day  heparin  Injectable 5000 Unit(s) SubCutaneous every 8 hours  influenza   Vaccine 0.5 milliLiter(s) IntraMuscular once  lactated ringers. 1000 milliLiter(s) (75 mL/Hr) IV Continuous <Continuous>    MEDICATIONS  (PRN):  sodium chloride 0.65% Nasal 1 Spray(s) Both Nostrils two times a day PRN Nasal Congestion      Allergies    No Known Allergies    Intolerances        Vital Signs Last 24 Hrs  T(C): 36.6 (20 Oct 2017 04:09), Max: 36.7 (19 Oct 2017 13:10)  T(F): 97.9 (20 Oct 2017 04:09), Max: 98.1 (19 Oct 2017 13:10)  HR: 84 (20 Oct 2017 04:09) (79 - 84)  BP: 104/71 (20 Oct 2017 04:09) (94/62 - 110/73)  BP(mean): --  RR: 18 (20 Oct 2017 04:09) (18 - 18)  SpO2: 96% (20 Oct 2017 04:09) (96% - 97%)    I&O's Summary    19 Oct 2017 07:01  -  20 Oct 2017 07:00  --------------------------------------------------------  IN: 1020 mL / OUT: 1440 mL / NET: -420 mL        ON EXAM:    Constitutional: well-nourished, well-developed, NAD   HEENT:  MMM, sclerae anicteric, conjunctivae clear, no oral cyanosis.  Pulmonary: Non-labored, breath sounds are decr bilaterally, crackles right base; right pleurex in place. Left pigtail  Cardiovascular: Regular, S1 and S2.  No murmur.  No rubs, gallops or clicks  Gastrointestinal: Bowel Sounds present, soft, nontender.   Lymph: No peripheral edema.   Neurological: Alert, no focal deficits  Skin: No rashes.  Psych:  Mood & affect appropriate    LABS: All Labs Reviewed:                        10.8   4.8   )-----------( 318      ( 20 Oct 2017 07:22 )             33.2                         10.1   4.5   )-----------( 272      ( 19 Oct 2017 06:57 )             28.8                         10.6   5.8   )-----------( 299      ( 18 Oct 2017 06:58 )             31.4     20 Oct 2017 07:22    137    |  100    |  11     ----------------------------<  91     4.3     |  29     |  0.49   19 Oct 2017 06:57    136    |  100    |  14     ----------------------------<  87     3.8     |  28     |  0.40   18 Oct 2017 06:58    136    |  101    |  15     ----------------------------<  93     4.3     |  27     |  0.53     Ca    9.0        20 Oct 2017 07:22  Ca    8.2        19 Oct 2017 06:57  Ca    7.7        18 Oct 2017 06:58  Phos  2.9       18 Oct 2017 06:58  Mg     2.1       18 Oct 2017 06:58            Blood Culture: Organism --  Gram Stain Blood -- Gram Stain   No polymorphonuclear cells seen per low power field  No organisms seen per oil power field  Specimen Source .Tissue Other, right pleura  Culture-Blood --    Organism --  Gram Stain Blood -- Gram Stain   polymorphonuclear leukocytes seen per low power field  No organisms seen per oil power field  by cytocentrifuge  Specimen Source .Body Fluid Pleural Fluid  Culture-Blood --

## 2017-10-20 NOTE — DISCHARGE NOTE ADULT - ADDITIONAL INSTRUCTIONS
Follow up with Dr. Hannah Humphries in 2 weeks [phone]  [address] [place] [address] Follow up with Dr. Hannah Humphries in 2 weeks [phone]  [address] [place], [place] [id number] Follow up with Dr. Hannah Humphries in 2 weeks.  Call [phone] for an appointment and directions.  Follow up at [facility] with Dr. Negro, within 1 week.

## 2017-10-20 NOTE — PROGRESS NOTE ADULT - SUBJECTIVE AND OBJECTIVE BOX
VITAL SIGNS    Vital Signs Last 24 Hrs  T(C): 36.6 (10-20-17 @ 04:09), Max: 36.7 (10-19-17 @ 13:10)  T(F): 97.9 (10-20-17 @ 04:09), Max: 98.1 (10-19-17 @ 13:10)  HR: 84 (10-20-17 @ 04:09) (79 - 84)  BP: 104/71 (10-20-17 @ 04:09) (94/62 - 110/73)  RR: 18 (10-20-17 @ 04:09) (18 - 18)  SpO2: 96% (10-20-17 @ 04:09) (96% - 97%)            10-19 @ 07:01  -  10-20 @ 07:00  --------------------------------------------------------  IN: 1020 mL / OUT: 1440 mL / NET: -420 mL       Daily Weight in k.4 (20 Oct 2017 04:09)  Admit Wt: Drug Dosing Weight  Height (cm): 162.56 (14 Oct 2017 20:22)  Weight (kg): 60.8 (15 Oct 2017 05:15)  BMI (kg/m2): 23 (15 Oct 2017 05:15)  BSA (m2): 1.65 (15 Oct 2017 05:15)    MEDICATIONS  bicalutamide 150 milliGRAM(s) Oral daily  calcium carbonate 1250 mG + Vitamin D (OsCal 500 + D) 1 Tablet(s) Oral daily  cholecalciferol 1000 Unit(s) Oral daily  dronabinol 2.5 milliGRAM(s) Oral two times a day  heparin  Injectable 5000 Unit(s) SubCutaneous every 8 hours  influenza   Vaccine 0.5 milliLiter(s) IntraMuscular once  lactated ringers. 1000 milliLiter(s) IV Continuous <Continuous>  sodium chloride 0.65% Nasal 1 Spray(s) Both Nostrils two times a day PRN      PHYSICAL EXAM    Subjective: NAD no SOB  Neurology: alert and oriented x 3, nonfocal, no gross deficits  CV :S1S2  Lungs: CTA b/l right pleurx intact -350/600  left pigtail to water seal -140/240cc  Abdomen: soft, NT,ND, (+ )BM  :  voiding  Extremities: -c/c/e       LABS  10-20    137  |  100  |  11  ----------------------------<  91  4.3   |  29  |  0.49<L>    Ca    9.0      20 Oct 2017 07:22                                   10.8   4.8   )-----------( 318      ( 20 Oct 2017 07:22 )             33.2                 PAST MEDICAL & SURGICAL HISTORY:  Anemia  Prostate CA: stage IV with metastasis to bone  No significant past surgical history

## 2017-10-20 NOTE — DISCHARGE NOTE ADULT - CARE PLAN
Principal Discharge DX:	Hydropneumothorax  Goal:	Resolved  Instructions for follow-up, activity and diet:	Follow up with Dr. Humphries within 2 weeks or sooner if symptoms reoccur.      Participate in physical activity as tolerated.  Seek immediate medical attention if the shortness of breath, or if you experience chest pain or palpitations.  Secondary Diagnosis:	Pleural effusion on right  Instructions for follow-up, activity and diet:	Follow up with Dr. Humphries within 2 weeks or sooner if symptoms reoccur.      Visiting Nurse to drain on Monday, Wednesday, and Friday - up to one liter each time - and change dressing as needed.    Participate in physical activity as tolerated.  Seek immediate medical attention if the shortness of breath, or if you experience chest pain or palpitations.  Secondary Diagnosis:	Prostate cancer  Instructions for follow-up, activity and diet:	Follow up in the [facility] within 1 week.  Secondary Diagnosis:	Anemia  Instructions for follow-up, activity and diet:	Notify your doctor immediately if you experience abnormal bleeding.  Avoid overuse of NSAIDs (aspirin, Ibuprofen, Advil, Motrin, and Aleve) unless instructed to do so by your doctor.  Signs of worsening anemia include dizziness, lightheadedness, difficulty concentrating, chest pain, palpitations, and shortness of breath.  If you experience these symptoms call your doctor or call an ambulance to take you to the emergency room.    Follow up in the [facility] within 1 week.

## 2017-10-20 NOTE — PROGRESS NOTE ADULT - PROBLEM SELECTOR PLAN 4
no signs of active bleeding

## 2017-10-20 NOTE — PROGRESS NOTE ADULT - PROBLEM SELECTOR PROBLEM 2
Pleural effusion on right

## 2017-10-20 NOTE — PROGRESS NOTE ADULT - SUBJECTIVE AND OBJECTIVE BOX
Interval Events:  No events overnight  No dyspnea  Right Pleurx in place  Left CT draining approx 250 cc in past 24 hours    REVIEW OF SYSTEMS:  Constitutional: [ ] negative [ ] fevers [ ] chills [ ] weight loss [ ] weight gain  HEENT: [ ] negative [ ] dry eyes [ ] eye irritation [ ] postnasal drip [ ] nasal congestion  CV: [ ] negative  [ ] chest pain [ ] orthopnea [ ] palpitations [ ] murmur  Resp: [ ] negative [ ] cough [ ] shortness of breath [ ] dyspnea [ ] wheezing [ ] sputum [ ] hemoptysis  GI: [ ] negative [ ] nausea [ ] vomiting [ ] diarrhea [ ] constipation [ ] abd pain [ ] dysphagia   : [ ] negative [ ] dysuria [ ] nocturia [ ] hematuria [ ] increased urinary frequency  Musculoskeletal: [ ] negative [ ] back pain [ ] myalgias [ ] arthralgias [ ] fracture  Skin: [ ] negative [ ] rash [ ] itch  Neurological: [ ] negative [ ] headache [ ] dizziness [ ] syncope [ ] weakness [ ] numbness  Psychiatric: [ ] negative [ ] anxiety [ ] depression  Endocrine: [ ] negative [ ] diabetes [ ] thyroid problem  Hematologic/Lymphatic: [ ] negative [ ] anemia [ ] bleeding problem  Allergic/Immunologic: [ ] negative [ ] itchy eyes [ ] nasal discharge [ ] hives [ ] angioedema  [x] All other systems negative  [ ] Unable to assess ROS because ________    OBJECTIVE:  ICU Vital Signs Last 24 Hrs  T(C): 36.6 (20 Oct 2017 04:09), Max: 36.7 (19 Oct 2017 13:10)  T(F): 97.9 (20 Oct 2017 04:09), Max: 98.1 (19 Oct 2017 13:10)  HR: 84 (20 Oct 2017 04:09) (79 - 84)  BP: 104/71 (20 Oct 2017 04:09) (94/62 - 110/73)  BP(mean): --  ABP: --  ABP(mean): --  RR: 18 (20 Oct 2017 04:09) (18 - 18)  SpO2: 96% (20 Oct 2017 04:09) (96% - 97%)        10-19 @ 07:01  -  10-20 @ 07:00  --------------------------------------------------------  IN: 1020 mL / OUT: 1440 mL / NET: -420 mL      CAPILLARY BLOOD GLUCOSE          PHYSICAL EXAM:  General: Awake, alert, oriented X 3.   Neck: No JVD.   Respiratory: Left CT in place, draining serous fluid. Right side with Pleurx, draining serous (initially serosanguinous) fluid. Breath sounds with some crackles at bases bilaterally. No wheezing. No use of accessory muscles.  Cardiovascular: S1 S2 normal. No murmurs, rubs or gallops.   Abdomen: Soft, non-tender, non-distended.  Extremities: Warm to touch. Peripheral pulse palpable. No pedal edema.   Skin: No rashes or skin lesions      HOSPITAL MEDICATIONS:  heparin  Injectable 5000 Unit(s) SubCutaneous every 8 hours            dronabinol 2.5 milliGRAM(s) Oral two times a day      bicalutamide 150 milliGRAM(s) Oral daily      calcium carbonate 1250 mG + Vitamin D (OsCal 500 + D) 1 Tablet(s) Oral daily  cholecalciferol 1000 Unit(s) Oral daily  lactated ringers. 1000 milliLiter(s) IV Continuous <Continuous>    influenza   Vaccine 0.5 milliLiter(s) IntraMuscular once    sodium chloride 0.65% Nasal 1 Spray(s) Both Nostrils two times a day PRN        LABS:                        10.8   4.8   )-----------( 318      ( 20 Oct 2017 07:22 )             33.2     Hgb Trend: 10.8<--, 10.1<--, 10.6<--, 10.6<--, 10.1<--  10-20    137  |  100  |  11  ----------------------------<  91  4.3   |  29  |  0.49<L>    Ca    9.0      20 Oct 2017 07:22      Creatinine Trend: 0.49<--, 0.40<--, 0.53<--, 0.49<--, 0.63<--, 0.54<--            MICROBIOLOGY:   Cytopathology - Non Gyn Report (10.17.17 @ 20:26)    Cytopathology - Non Gyn Report:   ACCESSION No:  [id number]    Specimen(s) Submitted  PLEURAL FLUID, RIGHT    Final Diagnosis  PLEURAL FLUID, RIGHT  NEGATIVE FOR MALIGNANT CELLS.  _________________________________________________________________      Culture - Tissue with Gram Stain (collected 17 Oct 2017 22:34)  Source: .Tissue Other, right pleura  Gram Stain (18 Oct 2017 01:18):    No polymorphonuclear cells seen per low power field    No organisms seen per oil power field  Preliminary Report (18 Oct 2017 16:01):    No growth to date    Culture - Fungal, Tissue (collected 17 Oct 2017 22:34)  Source: .Tissue right pleura  Preliminary Report (18 Oct 2017 14:21):    Testing in progress    Culture - Acid Fast - Tissue w/Smear (collected 17 Oct 2017 22:34)  Source: .Tissue Other, right pleura    Culture - Fungal, Body Fluid (collected 17 Oct 2017 22:27)  Source: .Body Fluid pleural fluid  Preliminary Report (18 Oct 2017 14:21):    Testing in progress    Culture - Body Fluid with Gram Stain (collected 17 Oct 2017 22:27)  Source: .Body Fluid Pleural Fluid  Gram Stain (18 Oct 2017 01:19):    polymorphonuclear leukocytes seen per low power field    No organisms seen per oil power field    by cytocentrifuge  Preliminary Report (18 Oct 2017 17:00):    No growth      RADIOLOGY:  [ ] Reviewed and interpreted by me    PULMONARY FUNCTION TESTS:    EKG:    ASSESSMENT AND RECOMMENDATION    67 M with history of metastatic prostate cancer with bilateral pleural effusions presented with worsening shortness of breath with left hydropneumothorax along with right pleural effusion. He is now s/p left chest tube and right Pleurx placement. Dyspnea improved. Pleural fluid of left side indicate exudative process.     -Cytology from right pleural fluid negative for malignancy  -Awaiting cytology from left  -Continue supplemental O2 to maintain SpO2 > 92%  -Will plan to clamp left tube today - repeat CXR 4 hours after and if no reaccumulation of fluid or air will plan to pull  -Likelihood of fluid reaccumulating - may need left Pleurx if this is the case    Dylan Whitaker MD  Pulmonary and Critical Care Fellow  #[phone]

## 2017-10-20 NOTE — PROGRESS NOTE ADULT - ASSESSMENT
67 M Hx prostate cancer with mets to bone presents with increased SOB and b/l pleural effusion.  Left pigtail placed 10/14/17 . Right VATS pleurx placement performed 10/17/17. 67 M Hx prostate cancer with mets to bone presents with increased SOB and b/l pleural effusion.  Left pigtail placed 10/14/17 . Right VATS pleurx placement performed 10/17/17.  10/20/17 right pleurx capped  called to bedside for left chest tube dislodged from patient's thorax, Occlusive dressing placed. F/U cxr negative for ptx.

## 2017-10-20 NOTE — DISCHARGE NOTE ADULT - CARE PROVIDERS DIRECT ADDRESSES
,DirectAddress_Unknown ,DirectAddress_Unknown,[email or web address]

## 2017-10-20 NOTE — PROVIDER CONTACT NOTE (OTHER) - ACTION/TREATMENT ORDERED:
MD aware. Will continue to monitor
NP notified. Electrolytes to be ordered with AM lab. Will continue to monitor pt.
NP notified. Ok to use jeronimo catheter as per NP. Will continue to monitor pt.
CT surgery aware and occlusive dressing applied

## 2017-10-20 NOTE — PROGRESS NOTE ADULT - ASSESSMENT
67 M with history of metastatic prostate cancer to bone, recurrent pleural effusion s/p drainage presents with worsening dyspnea over the last 2-3 days Dyspnea worse with exertion with hydropneumothorax s/p chest tube.    - Chest discomfort is nonanginal and now secondary to pigtail placement  - Pigtail and pleurex management per thoracic surgery and pulmonary  - prior echo showed thickened pericardium. EKG with low voltage. There is no need to repeat echo to evaluate for possible pericardial effusion, as there was no visible effusion on recent CT scan, and it seems unlikely to be contributing to dyspnea in a significant way  - Monitor and replete electrolytes. Keep K>4.0 and Mg>2.0.   - Further cardiac workup will depend on clinical course.   - All other workup per primary team. Will followup.

## 2017-10-20 NOTE — PROGRESS NOTE ADULT - PROBLEM SELECTOR PLAN 3
out pt onc f/u
ONC evalauted pt - poss f/u with Ninoska as out pt after discharge
out pt onc f/u

## 2017-10-20 NOTE — PROGRESS NOTE ADULT - SUBJECTIVE AND OBJECTIVE BOX
chief complaint : shortness of breath         SUBJECTIVE / OVERNIGHT EVENTS: pt denies chest pain,sob, nausea, v, abd pain pt moving constantly turning over left side even after repeatedly stating not to move frequently , left side pigtail came off - pt asymptomatic - informed Thoracic surgery       MEDICATIONS  (STANDING):  bicalutamide 150 milliGRAM(s) Oral daily  calcium carbonate 1250 mG + Vitamin D (OsCal 500 + D) 1 Tablet(s) Oral daily  cholecalciferol 1000 Unit(s) Oral daily  dronabinol 2.5 milliGRAM(s) Oral two times a day  heparin  Injectable 5000 Unit(s) SubCutaneous every 8 hours  influenza   Vaccine 0.5 milliLiter(s) IntraMuscular once  lactated ringers. 1000 milliLiter(s) (75 mL/Hr) IV Continuous <Continuous>    MEDICATIONS  (PRN):  sodium chloride 0.65% Nasal 1 Spray(s) Both Nostrils two times a day PRN Nasal Congestion      Vital Signs Last 24 Hrs  T(C): 36.6 (20 Oct 2017 04:09), Max: 36.7 (19 Oct 2017 13:10)  T(F): 97.9 (20 Oct 2017 04:09), Max: 98.1 (19 Oct 2017 13:10)  HR: 84 (20 Oct 2017 04:09) (79 - 84)  BP: 104/71 (20 Oct 2017 04:09) (94/62 - 110/73)  BP(mean): --  RR: 18 (20 Oct 2017 04:09) (18 - 18)  SpO2: 96% (20 Oct 2017 04:09) (96% - 97%)    Constitutional: No fever, fatigue  Skin: No rash.  Eyes: No recent vision problems or eye pain.  ENT: No congestion, ear pain, or sore throat.  Cardiovascular: No chest pain or palpation.  Respiratory: No cough, shortness of breath, congestion, or wheezing.  Gastrointestinal: No abdominal pain, nausea, vomiting, or diarrhea.  Genitourinary: No dysuria.  Musculoskeletal: No joint swelling.  Neurologic: No headache.    PHYSICAL EXAM:  GENERAL: NAD  EYES: EOMI, PERRLA  NECK: Supple, No JVD  CHEST/LUNG: dec breath sounds at bases   HEART:  S1 , S2 +  ABDOMEN: soft, bs+  EXTREMITIES:  no edema  NEUROLOGY: alert awake oriented   chest tube +    LABS:  10-20    137  |  100  |  11  ----------------------------<  91  4.3   |  29  |  0.49<L>    Ca    9.0      20 Oct 2017 07:22      Creatinine Trend: 0.49 <--, 0.40 <--, 0.53 <--, 0.49 <--, 0.63 <--, 0.54 <--                        10.8   4.8   )-----------( 318      ( 20 Oct 2017 07:22 )             33.2     Urine Studies:

## 2017-10-20 NOTE — PHYSICAL THERAPY INITIAL EVALUATION ADULT - PERTINENT HX OF CURRENT PROBLEM, REHAB EVAL
67 M presents with worsening dyspnea over the last 2-3 days Dyspnea worse with exertion. Associated non productive cough. CXR: left hydropneumothorax. As per pts wife, pt been having cough/SOB past few weeks after 2nd pleural tap. 67 M presents with worsening dyspnea over the last 2-3 days Dyspnea worse with exertion. Associated non productive cough. CXR: left hydropneumothorax. As per pts wife, pt been having cough/SOB past few weeks after 2nd pleural tap. Pt self-removal of L sided chest tube 10/20

## 2017-10-20 NOTE — DISCHARGE NOTE ADULT - PLAN OF CARE
Resolved Follow up with Dr. Humphries within 2 weeks or sooner if symptoms reoccur.      Participate in physical activity as tolerated.  Seek immediate medical attention if the shortness of breath, or if you experience chest pain or palpitations. Follow up with Dr. Humphries within 2 weeks or sooner if symptoms reoccur.      Visiting Nurse to drain on Monday, Wednesday, and Friday - up to one liter each time - and change dressing as needed.    Participate in physical activity as tolerated.  Seek immediate medical attention if the shortness of breath, or if you experience chest pain or palpitations. Follow up in the [facility] within 1 week. Notify your doctor immediately if you experience abnormal bleeding.  Avoid overuse of NSAIDs (aspirin, Ibuprofen, Advil, Motrin, and Aleve) unless instructed to do so by your doctor.  Signs of worsening anemia include dizziness, lightheadedness, difficulty concentrating, chest pain, palpitations, and shortness of breath.  If you experience these symptoms call your doctor or call an ambulance to take you to the emergency room.    Follow up in the [facility] within 1 week.

## 2017-10-20 NOTE — PROVIDER CONTACT NOTE (OTHER) - ASSESSMENT
Pt asymptomatic, pt denies CP/SOB or dizziness
Pt AOx4. IV nurse assessed jeronimo catheter with positive blood return.
Pt AOx4. Pt denies SOB, chest pain or any other distress. s/p r. VATS, right Pleurx placement. R. chest tube to water seal, left chest tube to low wall suction. BP 98/72 HR 83 O2 99%, RR 18, Temp 97.6 F.
Pt. stable, no c/o pain or sob

## 2017-10-20 NOTE — PROGRESS NOTE ADULT - ATTENDING COMMENTS
Pt seen at the bedside. pt's left pig tail came out this am accidentally. Rt chest tube was removed by CT surgery this am. Pt currently has Rt. pleurex catheter only. fup repeat CXR pending, pt will need left sided pleurex if reaccumulates the pleural effusion. Fup pleural studies.
pt with h/o metastatic prostate cancer, now with recurrent b/l pl effusion, exudative, pt going for VATS and Pleurx on rt side tomorrow, cont to monitor left sided chest tube drainage, no air bubbles, can convert to water seal, repeat CXR tonight. Fup remaining pl fluid studies.
Agree with above. Patient seen and examined. No changes to past history.  -Metastatic prostate cancer - on Taxotere - has been on/off varying treatment regimens based on genetic profile from Oncologist in [place] - would obtain Oncology followup - from my recollection Taxotere is an appropriate treatment but now wife is looking for a TP53 gene mutation directed treatment  -Hydropneumothorax - s/p left chest tube placement yesterday - continue to suction for now. Symptomatic improvement - left sided effusion exudative by lights criteria, normal TG, cultures negative - cytology pending  -Large right pleural effusion - being planned for VATS, pleural/lung biopsy, and PleurX placement with CT surgery tomorrow  -Maintain O2 saturations > 90  -Thank you for allowing us to participate in the care of this patient.

## 2017-10-21 VITALS
OXYGEN SATURATION: 96 % | TEMPERATURE: 98 F | SYSTOLIC BLOOD PRESSURE: 119 MMHG | DIASTOLIC BLOOD PRESSURE: 79 MMHG | RESPIRATION RATE: 16 BRPM | HEART RATE: 73 BPM

## 2017-10-21 LAB
ANION GAP SERPL CALC-SCNC: 9 MMOL/L — SIGNIFICANT CHANGE UP (ref 5–17)
BUN SERPL-MCNC: 12 MG/DL — SIGNIFICANT CHANGE UP (ref 7–23)
CALCIUM SERPL-MCNC: 8.5 MG/DL — SIGNIFICANT CHANGE UP (ref 8.4–10.5)
CHLORIDE SERPL-SCNC: 102 MMOL/L — SIGNIFICANT CHANGE UP (ref 96–108)
CO2 SERPL-SCNC: 28 MMOL/L — SIGNIFICANT CHANGE UP (ref 22–31)
CREAT SERPL-MCNC: 0.45 MG/DL — LOW (ref 0.5–1.3)
GLUCOSE SERPL-MCNC: 87 MG/DL — SIGNIFICANT CHANGE UP (ref 70–99)
HCT VFR BLD CALC: 28.5 % — LOW (ref 39–50)
HGB BLD-MCNC: 9.5 G/DL — LOW (ref 13–17)
MCHC RBC-ENTMCNC: 30.1 PG — SIGNIFICANT CHANGE UP (ref 27–34)
MCHC RBC-ENTMCNC: 33.4 GM/DL — SIGNIFICANT CHANGE UP (ref 32–36)
MCV RBC AUTO: 89.9 FL — SIGNIFICANT CHANGE UP (ref 80–100)
PLATELET # BLD AUTO: 260 K/UL — SIGNIFICANT CHANGE UP (ref 150–400)
POTASSIUM SERPL-MCNC: 4.1 MMOL/L — SIGNIFICANT CHANGE UP (ref 3.5–5.3)
POTASSIUM SERPL-SCNC: 4.1 MMOL/L — SIGNIFICANT CHANGE UP (ref 3.5–5.3)
RBC # BLD: 3.17 M/UL — LOW (ref 4.2–5.8)
RBC # FLD: 16 % — HIGH (ref 10.3–14.5)
SODIUM SERPL-SCNC: 139 MMOL/L — SIGNIFICANT CHANGE UP (ref 135–145)
WBC # BLD: 3.7 K/UL — LOW (ref 3.8–10.5)
WBC # FLD AUTO: 3.7 K/UL — LOW (ref 3.8–10.5)

## 2017-10-21 PROCEDURE — 99233 SBSQ HOSP IP/OBS HIGH 50: CPT

## 2017-10-21 PROCEDURE — 71020: CPT | Mod: 26

## 2017-10-21 RX ORDER — CHOLECALCIFEROL (VITAMIN D3) 125 MCG
1000 CAPSULE ORAL
Qty: 0 | Refills: 0 | COMMUNITY
Start: 2017-10-21

## 2017-10-21 RX ORDER — SODIUM CHLORIDE 0.65 %
1 AEROSOL, SPRAY (ML) NASAL
Qty: 0 | Refills: 0 | COMMUNITY
Start: 2017-10-21

## 2017-10-21 RX ORDER — BICALUTAMIDE 50 MG/1
3 TABLET, FILM COATED ORAL
Qty: 0 | Refills: 0 | COMMUNITY
Start: 2017-10-21

## 2017-10-21 RX ADMIN — Medication 1 TABLET(S): at 11:48

## 2017-10-21 RX ADMIN — HEPARIN SODIUM 5000 UNIT(S): 5000 INJECTION INTRAVENOUS; SUBCUTANEOUS at 06:11

## 2017-10-21 RX ADMIN — HEPARIN SODIUM 5000 UNIT(S): 5000 INJECTION INTRAVENOUS; SUBCUTANEOUS at 13:32

## 2017-10-21 RX ADMIN — Medication 1000 UNIT(S): at 11:48

## 2017-10-21 RX ADMIN — Medication 2.5 MILLIGRAM(S): at 06:11

## 2017-10-21 RX ADMIN — BICALUTAMIDE 150 MILLIGRAM(S): 50 TABLET, FILM COATED ORAL at 13:13

## 2017-10-21 NOTE — PROGRESS NOTE ADULT - PROBLEM SELECTOR PROBLEM 1
Hydropneumothorax
Pleural effusion on right
Hydropneumothorax

## 2017-10-21 NOTE — PROGRESS NOTE ADULT - SUBJECTIVE AND OBJECTIVE BOX
· Subjective and Objective:   [facility] CARDIOLOG[facility] CONSULTANTS:    Kinjal Clifford, Charley Santizo, Garry Saucedo Savella, Goodger      [phone]    CHIEF COMPLAINT: Patient is a 67y old  Male who presents with a chief complaint of sob (20 Oct 2017 17:14)    Patient resting comfortably in bed in NAD.  Laying flat with no respiratory distress.  No complaints of chest pain, dyspnea, palpitations, PND, or orthopnea.  s/p l pigtail and right pleurex catheter placement. Breathing much improved    ROS: negative unless otherwise mentioned.    TELEMETRY: NSR     REVIEW OF SYSTEMS:  CONSTITUTIONAL: No fever, weight loss, or fatigue  EYES: No eye pain, visual disturbances, or discharge  ENMT:  No difficulty hearing, tinnitus, vertigo; No sinus or throat pain  NECK: No pain or stiffness  RESPIRATORY: denies cough,No wheezing, chills or hemoptysis; No shortness of breath  CARDIOVASCULAR: No chest pain,No palpitations, dizziness, or leg swelling  GASTROINTESTINAL: No abdominal or epigastric pain. No nausea, vomiting, or hematemesis; No diarrhea , no constipation. No melena or hematochezia.  GENITOURINARY: No dysuria, frequency, hematuria, or incontinence  NEUROLOGICAL: No headaches, memory loss, loss of strength, numbness, or tremors  SKIN: No itching, burning, rashes, or lesions   LYMPH NODES: No enlarged glands  ENDOCRINE: No heat or cold intolerance; No hair loss  MUSCULOSKELETAL: No joint pain or swelling; No muscle, back, or extremity pain  PSYCHIATRIC: No depression, anxiety, mood swings, or difficulty sleeping  HEME/LYMPH: No easy bruising, or bleeding gums  ALLERGY AND IMMUNOLOGIC: No hives or eczema          PAST MEDICAL & SURGICAL HISTORY:  Anemia  Prostate CA: stage IV with metastasis to bone  No significant past surgical history      MEDICATIONS  (STANDING):  bicalutamide 150 milliGRAM(s) Oral daily  calcium carbonate 1250 mG + Vitamin D (OsCal 500 + D) 1 Tablet(s) Oral daily  cholecalciferol 1000 Unit(s) Oral daily  dronabinol 2.5 milliGRAM(s) Oral two times a day  heparin  Injectable 5000 Unit(s) SubCutaneous every 8 hours  influenza   Vaccine 0.5 milliLiter(s) IntraMuscular once  lactated ringers. 1000 milliLiter(s) (75 mL/Hr) IV Continuous <Continuous>      Allergies    No Known Allergies    Intolerances                              9.5    3.7   )-----------( 260      ( 21 Oct 2017 06:47 )             28.5       10-    139  |  102  |  12  ----------------------------<  87  4.1   |  28  |  0.45<L>    Ca    8.5      21 Oct 2017 06:47                                  Daily     Daily Weight in k.3 (21 Oct 2017 04:13)    I&O's Summary    20 Oct 2017 07:01  -  21 Oct 2017 07:00  --------------------------------------------------------  IN: 960 mL / OUT: 350 mL / NET: 610 mL        Vital Signs Last 24 Hrs  T(C): 37.1 (21 Oct 2017 04:13), Max: 37.1 (21 Oct 2017 04:13)  T(F): 98.7 (21 Oct 2017 04:13), Max: 98.7 (21 Oct 2017 04:13)  HR: 68 (21 Oct 2017 04:13) (68 - 108)  BP: 95/61 (21 Oct 2017 04:13) (95/61 - 107/72)  BP(mean): --  RR: 18 (21 Oct 2017 04:13) (18 - 19)  SpO2: 96% (21 Oct 2017 04:13) (96% - 98%)    PHYSICAL EXAM:   · Constitutional	Well-developed, well nourished  · Eyes	EOMI; PERRL; no drainage or redness  · ENMT	No oral lesions; no gross abnormalities  · Neck	No bruits; no thyromegaly or nodules  · Respiratory	Normal breath sounds b/l, No RRW  · Cardiovascular	Regular rate & rhythm, normal S1, S2; no murmurs, gallops or rubs; no S3, S4  · Gastrointestinal	Soft, non-tender, no hepatosplenomegaly, normal bowel sounds  · Extremities	No cyanosis, clubbing or edema  · Vascular	Equal and normal pulses (carotid, femoral, dorsalis pedis)  · Neurological	Alert & oriented; no sensory, motor or coordination deficits, normal reflexes

## 2017-10-21 NOTE — PROGRESS NOTE ADULT - PROBLEM SELECTOR PLAN 1
chest tube to suction  pulm f/u   f/u cytology  elevated Ddimer - pt and pts wife refused contrast even after multiple extensive discussion about the need for iv contrast - will obtain doppler lower ext
- Total of 2l drainage noted from the CT.   - No air leak.  - Resolution of hydropneumothorax noted on the CT. Continue to clws. Will change to water seal.   - Thoracic surgery planning for OR for the right PLEF with VATS, pleural biopsy and possible pleurex placement.   - Continue supplemental o2  - Pain control.
Cap right pleurx  Anticipate VNS drainage of right pleurx every Monday, Wednesday and Friday as outpatient  Aspirate up to 1L during each day of drainage. Change dressing as needed  Plan to d/c left pigtail today. Reaccumulation of left pleural effusion likely. Will follow up with Dr Humphries for left pleurx placement if indicated
chest tube to suction  pulm f/u   f/u cytology  elevated Ddimer - pt and pts wife refused contrast even after multiple extensive discussion about the need for iv contrast - will obtain doppler lower ext
s/p chest tube
s/p left chest tube placement   Maintain left CT on CLWS.   daily CXR, monitor drainage   Continue care per primary team, will follow.
s/p left chest tube placement daily CXR maintain on clws
s/p right pleurx catheter placement.  Cap today  D/c left pigtail in am and consider pleurx placement if pleural effusion reaccumulates.
s/p right pleurx catheter placement.  VNS to drain right pleurx Monday, Wednesday and Friday as out patient  up to 1L on day of drainage, change dressing as needed  Follow up with Dr Humphries in 2 weeks. Will consider Left pleurx if pleural effusion reaccumulates. [phone]
sp right pleurx catheter placement.  will cap in 1-2 days.  FU Bx    Left PTC monitor output over next couple days.  consider removing and placing L pleurx if fluid reaccumulates
chest tube to suction  pulm f/u   f/u cytology  elevated Ddimer - pt and pts wife refused contrast even after multiple extensive discussion about the need for iv contrast - will obtain doppler lower ext

## 2017-10-21 NOTE — PROGRESS NOTE ADULT - SUBJECTIVE AND OBJECTIVE BOX
VITAL SIGNS    Vital Signs Last 24 Hrs  T(C): 36.6 (10-21-17 @ 11:45), Max: 37.1 (10-21-17 @ 04:13)  T(F): 97.8 (10-21-17 @ 11:45), Max: 98.7 (10-21-17 @ 04:13)  HR: 73 (10-21-17 @ 11:45) (68 - 108)  BP: 119/79 (10-21-17 @ 11:45) (95/61 - 119/79)  RR: 16 (10-21-17 @ 11:45) (16 - 19)  SpO2: 96% (10-21-17 @ 11:45) (96% - 98%)            10-20 @ 07:01  -  10-21 @ 07:00  --------------------------------------------------------  IN: 960 mL / OUT: 350 mL / NET: 610 mL    Daily Weight in k.3 (21 Oct 2017 04:13)  Admit Wt: Drug Dosing Weight  Height (cm): 162.56 (14 Oct 2017 20:22)  Weight (kg): 60.8 (15 Oct 2017 05:15)  BMI (kg/m2): 23 (15 Oct 2017 05:15)  BSA (m2): 1.65 (15 Oct 2017 05:15)    MEDICATIONS  bicalutamide 150 milliGRAM(s) Oral daily  calcium carbonate 1250 mG + Vitamin D (OsCal 500 + D) 1 Tablet(s) Oral daily  cholecalciferol 1000 Unit(s) Oral daily  dronabinol 2.5 milliGRAM(s) Oral two times a day  heparin  Injectable 5000 Unit(s) SubCutaneous every 8 hours  influenza   Vaccine 0.5 milliLiter(s) IntraMuscular once  lactated ringers. 1000 milliLiter(s) IV Continuous <Continuous>  sodium chloride 0.65% Nasal 1 Spray(s) Both Nostrils two times a day PRN      PHYSICAL EXAM    Subjective: No complaints offered  Neurology: alert and oriented x 3, nonfocal, no gross deficits  CV :S1S2  Lungs: CTA b/l right pleurx intact  Abdomen: soft, NT,ND, (+ )BM  :  voiding  Extremities: -c/c/e      LABS  10-    139  |  102  |  12  ----------------------------<  87  4.1   |  28  |  0.45<L>    Ca    8.5      21 Oct 2017 06:47                                   9.5    3.7   )-----------( 260      ( 21 Oct 2017 06:47 )             28.5                 PAST MEDICAL & SURGICAL HISTORY:  Anemia  Prostate CA: stage IV with metastasis to bone  No significant past surgical history

## 2017-10-21 NOTE — PROGRESS NOTE ADULT - PROVIDER SPECIALTY LIST ADULT
CT Surgery
Cardiology
Internal Medicine
Pulmonology
Thoracic Surgery
Cardiology
Internal Medicine
Pulmonology
Internal Medicine

## 2017-10-21 NOTE — PROGRESS NOTE ADULT - ASSESSMENT
67 M Hx prostate cancer with mets to bone presents with increased SOB and b/l pleural effusion.  Left pigtail placed 10/14/17 . Right VATS pleurx placement performed 10/17/17.  10/20 pleurx capped. Left pigtail inadvertently pulled by patient. F/U cxr no ptx  10/21 CXR clear b/l , no ptx

## 2017-10-21 NOTE — PROGRESS NOTE ADULT - ASSESSMENT
67 M with history of metastatic prostate cancer to bone, recurrent pleural effusion s/p drainage presents with worsening dyspnea over the last 2-3 days Dyspnea worse with exertion with hydropneumothorax s/p chest tube.    - Pigtail and pleurex management per thoracic surgery and pulmonary  - prior echo showed thickened pericardium. EKG with low voltage. There is no need to repeat echo to evaluate for possible pericardial effusion, as there was no visible effusion on recent CT scan, and it seems unlikely to be contributing to dyspnea in a significant way  - Monitor and replete electrolytes. Keep K>4.0 and Mg>2.0.   - Further cardiac workup will depend on clinical course.   - All other workup per primary team. Will followup.

## 2017-10-25 LAB — NON-GYNECOLOGICAL CYTOLOGY STUDY: SIGNIFICANT CHANGE UP

## 2017-10-26 ENCOUNTER — OUTPATIENT (OUTPATIENT)
Dept: OUTPATIENT SERVICES | Facility: HOSPITAL | Age: 67
LOS: 1 days | Discharge: HOME | End: 2017-10-26
Payer: MEDICARE

## 2017-10-26 DIAGNOSIS — C61 MALIGNANT NEOPLASM OF PROSTATE: ICD-10-CM

## 2017-11-01 ENCOUNTER — RESULT REVIEW (OUTPATIENT)
Age: 67
End: 2017-11-01

## 2017-11-01 ENCOUNTER — LABORATORY RESULT (OUTPATIENT)
Age: 67
End: 2017-11-01

## 2017-11-01 ENCOUNTER — APPOINTMENT (OUTPATIENT)
Dept: HEMATOLOGY ONCOLOGY | Facility: CLINIC | Age: 67
End: 2017-11-01
Payer: MEDICARE

## 2017-11-01 VITALS
WEIGHT: 133.6 LBS | OXYGEN SATURATION: 99 % | TEMPERATURE: 96 F | HEIGHT: 62.4 IN | BODY MASS INDEX: 24.27 KG/M2 | SYSTOLIC BLOOD PRESSURE: 124 MMHG | DIASTOLIC BLOOD PRESSURE: 89 MMHG | HEART RATE: 97 BPM

## 2017-11-01 DIAGNOSIS — Z80.0 FAMILY HISTORY OF MALIGNANT NEOPLASM OF DIGESTIVE ORGANS: ICD-10-CM

## 2017-11-01 DIAGNOSIS — Z92.3 PERSONAL HISTORY OF IRRADIATION: ICD-10-CM

## 2017-11-01 LAB
HCT VFR BLD CALC: 32.6 % — LOW (ref 39–50)
HGB BLD-MCNC: 10.8 G/DL — LOW (ref 13–17)
MCHC RBC-ENTMCNC: 29 PG — SIGNIFICANT CHANGE UP (ref 27–34)
MCHC RBC-ENTMCNC: 33.1 G/DL — SIGNIFICANT CHANGE UP (ref 32–36)
MCV RBC AUTO: 87.6 FL — SIGNIFICANT CHANGE UP (ref 80–100)
PLATELET # BLD AUTO: 254 K/UL — SIGNIFICANT CHANGE UP (ref 150–400)
RBC # BLD: 3.72 M/UL — LOW (ref 4.2–5.8)
RBC # FLD: 16.3 % — HIGH (ref 10.3–14.5)
WBC # BLD: 6 K/UL — SIGNIFICANT CHANGE UP (ref 3.8–10.5)
WBC # FLD AUTO: 6 K/UL — SIGNIFICANT CHANGE UP (ref 3.8–10.5)

## 2017-11-01 PROCEDURE — 99215 OFFICE O/P EST HI 40 MIN: CPT

## 2017-11-01 PROCEDURE — 88321 CONSLTJ&REPRT SLD PREP ELSWR: CPT

## 2017-11-01 RX ORDER — BEVACIZUMAB 400 MG/16ML
400 INJECTION, SOLUTION INTRAVENOUS
Qty: 32 | Refills: 0 | Status: DISCONTINUED | COMMUNITY
Start: 2017-07-13

## 2017-11-01 RX ORDER — BICALUTAMIDE 50 MG/1
50 TABLET ORAL
Qty: 270 | Refills: 0 | Status: DISCONTINUED | COMMUNITY
Start: 2017-06-06

## 2017-11-01 RX ORDER — MESALAMINE 1000 MG/1
1000 SUPPOSITORY RECTAL
Qty: 30 | Refills: 0 | Status: DISCONTINUED | COMMUNITY
Start: 2017-05-02

## 2017-11-01 RX ORDER — GOSERELIN ACETATE 10.8 MG/1
10.8 IMPLANT SUBCUTANEOUS
Qty: 1 | Refills: 0 | Status: DISCONTINUED | COMMUNITY
Start: 2017-06-29

## 2017-11-01 RX ORDER — CAMPHOR 0.45 %
1-0.1 GEL (GRAM) TOPICAL
Qty: 28 | Refills: 0 | Status: DISCONTINUED | COMMUNITY
Start: 2017-09-05

## 2017-11-01 RX ORDER — ONDANSETRON 4 MG/1
4 TABLET ORAL
Qty: 30 | Refills: 0 | Status: DISCONTINUED | COMMUNITY
Start: 2017-09-18

## 2017-11-01 RX ORDER — DASATINIB 50 MG/1
50 TABLET ORAL
Qty: 30 | Refills: 0 | Status: DISCONTINUED | COMMUNITY
Start: 2017-05-15

## 2017-11-01 RX ORDER — TBO-FILGRASTIM 300 UG/.5ML
300 INJECTION, SOLUTION SUBCUTANEOUS
Qty: 25 | Refills: 0 | Status: DISCONTINUED | COMMUNITY
Start: 2017-07-05

## 2017-11-01 RX ORDER — MULTIVIT-MIN/FOLIC/VIT K/LYCOP 400-300MCG
25 MCG TABLET ORAL
Refills: 0 | Status: ACTIVE | COMMUNITY

## 2017-11-01 RX ORDER — GREEN TEA/HOODIA GORDONII 315-12.5MG
CAPSULE ORAL
Refills: 0 | Status: ACTIVE | COMMUNITY

## 2017-11-01 RX ORDER — CYPROHEPTADINE HYDROCHLORIDE 4 MG/1
4 TABLET ORAL
Qty: 14 | Refills: 0 | Status: DISCONTINUED | COMMUNITY
Start: 2017-10-03

## 2017-11-01 RX ORDER — BEVACIZUMAB 100 MG/4ML
100 INJECTION, SOLUTION INTRAVENOUS
Qty: 8 | Refills: 0 | Status: DISCONTINUED | COMMUNITY
Start: 2017-07-13

## 2017-11-01 RX ORDER — LEVOFLOXACIN 750 MG/1
750 TABLET, FILM COATED ORAL
Qty: 7 | Refills: 0 | Status: DISCONTINUED | COMMUNITY
Start: 2017-08-21

## 2017-11-02 DIAGNOSIS — C79.51 SECONDARY MALIGNANT NEOPLASM OF BONE: ICD-10-CM

## 2017-11-02 LAB
ALBUMIN SERPL ELPH-MCNC: 3.4 G/DL
ALP BLD-CCNC: 47 U/L
ALT SERPL-CCNC: 10 U/L
ANION GAP SERPL CALC-SCNC: 14 MMOL/L
AST SERPL-CCNC: 20 U/L
BILIRUB SERPL-MCNC: 0.2 MG/DL
BUN SERPL-MCNC: 15 MG/DL
CALCIUM SERPL-MCNC: 9 MG/DL
CGA SERPL-MCNC: 83 NG/ML
CHLORIDE SERPL-SCNC: 102 MMOL/L
CO2 SERPL-SCNC: 24 MMOL/L
CREAT SERPL-MCNC: 0.53 MG/DL
GLUCOSE SERPL-MCNC: 84 MG/DL
HBV CORE IGG+IGM SER QL: REACTIVE
HBV SURFACE AG SER QL: REACTIVE
HCV AB SER QL: NONREACTIVE
HCV S/CO RATIO: 0.06 S/CO
LDH SERPL-CCNC: 156 U/L
POTASSIUM SERPL-SCNC: 4.6 MMOL/L
PROT SERPL-MCNC: 6.1 G/DL
PSA SERPL-MCNC: 47.78 NG/ML
SODIUM SERPL-SCNC: 140 MMOL/L
SURGICAL PATHOLOGY STUDY: SIGNIFICANT CHANGE UP
TESTOST SERPL-MCNC: <2.5 NG/DL

## 2017-11-06 ENCOUNTER — FORM ENCOUNTER (OUTPATIENT)
Age: 67
End: 2017-11-06

## 2017-11-07 ENCOUNTER — OUTPATIENT (OUTPATIENT)
Dept: OUTPATIENT SERVICES | Facility: HOSPITAL | Age: 67
LOS: 1 days | End: 2017-11-07
Payer: MEDICARE

## 2017-11-07 ENCOUNTER — APPOINTMENT (OUTPATIENT)
Dept: HEMATOLOGY ONCOLOGY | Facility: CLINIC | Age: 67
End: 2017-11-07

## 2017-11-07 ENCOUNTER — APPOINTMENT (OUTPATIENT)
Dept: CT IMAGING | Facility: IMAGING CENTER | Age: 67
End: 2017-11-07
Payer: MEDICARE

## 2017-11-07 DIAGNOSIS — C61 MALIGNANT NEOPLASM OF PROSTATE: ICD-10-CM

## 2017-11-07 PROCEDURE — 71260 CT THORAX DX C+: CPT

## 2017-11-07 PROCEDURE — 74177 CT ABD & PELVIS W/CONTRAST: CPT

## 2017-11-07 PROCEDURE — 74177 CT ABD & PELVIS W/CONTRAST: CPT | Mod: 26

## 2017-11-07 PROCEDURE — 71260 CT THORAX DX C+: CPT | Mod: 26

## 2017-11-08 ENCOUNTER — APPOINTMENT (OUTPATIENT)
Age: 67
End: 2017-11-08
Payer: MEDICARE

## 2017-11-08 VITALS
DIASTOLIC BLOOD PRESSURE: 68 MMHG | HEIGHT: 62 IN | TEMPERATURE: 97.8 F | SYSTOLIC BLOOD PRESSURE: 104 MMHG | HEART RATE: 94 BPM | OXYGEN SATURATION: 97 % | BODY MASS INDEX: 25.21 KG/M2 | WEIGHT: 137 LBS

## 2017-11-08 PROCEDURE — 99204 OFFICE O/P NEW MOD 45 MIN: CPT

## 2017-11-08 RX ORDER — BICALUTAMIDE 50 MG/1
50 TABLET ORAL
Refills: 0 | Status: ACTIVE | COMMUNITY

## 2017-11-09 ENCOUNTER — APPOINTMENT (OUTPATIENT)
Dept: THORACIC SURGERY | Facility: CLINIC | Age: 67
End: 2017-11-09
Payer: MEDICARE

## 2017-11-09 VITALS
HEART RATE: 89 BPM | HEIGHT: 62 IN | RESPIRATION RATE: 14 BRPM | BODY MASS INDEX: 25.21 KG/M2 | OXYGEN SATURATION: 96 % | DIASTOLIC BLOOD PRESSURE: 78 MMHG | SYSTOLIC BLOOD PRESSURE: 116 MMHG | WEIGHT: 137 LBS

## 2017-11-09 DIAGNOSIS — J90 PLEURAL EFFUSION, NOT ELSEWHERE CLASSIFIED: ICD-10-CM

## 2017-11-09 DIAGNOSIS — Z85.46 PERSONAL HISTORY OF MALIGNANT NEOPLASM OF PROSTATE: ICD-10-CM

## 2017-11-09 LAB — HBV SURFACE AB SER QL: NONREACTIVE

## 2017-11-09 PROCEDURE — 99024 POSTOP FOLLOW-UP VISIT: CPT

## 2017-11-10 RX ORDER — DRONABINOL 2.5 MG/1
2.5 CAPSULE ORAL
Qty: 60 | Refills: 0 | Status: DISCONTINUED | COMMUNITY
Start: 2017-09-05 | End: 2017-11-10

## 2017-11-10 RX ORDER — CARBOPLATIN 10 MG/ML
50 INJECTION, SOLUTION INTRAVENOUS
Qty: 5 | Refills: 0 | Status: DISCONTINUED | COMMUNITY
Start: 2017-05-31 | End: 2017-11-10

## 2017-11-13 ENCOUNTER — MESSAGE (OUTPATIENT)
Age: 67
End: 2017-11-13

## 2017-11-13 ENCOUNTER — OUTPATIENT (OUTPATIENT)
Dept: OUTPATIENT SERVICES | Facility: HOSPITAL | Age: 67
LOS: 1 days | End: 2017-11-13
Payer: MEDICARE

## 2017-11-13 ENCOUNTER — APPOINTMENT (OUTPATIENT)
Dept: NUCLEAR MEDICINE | Facility: IMAGING CENTER | Age: 67
End: 2017-11-13
Payer: MEDICARE

## 2017-11-13 DIAGNOSIS — C61 MALIGNANT NEOPLASM OF PROSTATE: ICD-10-CM

## 2017-11-13 LAB
HBV DNA # SERPL NAA+PROBE: 537 IU/ML
HEPB DNA PCR LOG: 2.73 LOGIU/ML

## 2017-11-13 PROCEDURE — 78306 BONE IMAGING WHOLE BODY: CPT | Mod: 26

## 2017-11-13 PROCEDURE — 78999 UNLISTED MISC PX DX NUC MED: CPT

## 2017-11-13 PROCEDURE — 78320: CPT | Mod: 26

## 2017-11-13 PROCEDURE — 78306 BONE IMAGING WHOLE BODY: CPT

## 2017-11-13 PROCEDURE — A9561: CPT

## 2017-11-15 LAB
CULTURE RESULTS: SIGNIFICANT CHANGE UP
SPECIMEN SOURCE: SIGNIFICANT CHANGE UP

## 2017-11-17 ENCOUNTER — APPOINTMENT (OUTPATIENT)
Age: 67
End: 2017-11-17
Payer: MEDICARE

## 2017-11-17 VITALS
HEIGHT: 62 IN | WEIGHT: 139 LBS | DIASTOLIC BLOOD PRESSURE: 88 MMHG | BODY MASS INDEX: 25.58 KG/M2 | TEMPERATURE: 98.1 F | SYSTOLIC BLOOD PRESSURE: 132 MMHG | HEART RATE: 86 BPM | RESPIRATION RATE: 15 BRPM

## 2017-11-17 PROCEDURE — 99213 OFFICE O/P EST LOW 20 MIN: CPT

## 2017-11-21 LAB
ESTRADIOL SERPL HS-MCNC: NORMAL
ESTRIOL SERPL-MCNC: NORMAL
ESTRONE SERPL-MCNC: NORMAL
HEPATITIS B GENOTYPE & DRUG RESISTANCE: NOT DETECTED

## 2017-11-27 ENCOUNTER — OUTPATIENT (OUTPATIENT)
Dept: OUTPATIENT SERVICES | Facility: HOSPITAL | Age: 67
LOS: 1 days | Discharge: HOME | End: 2017-11-27

## 2017-11-27 DIAGNOSIS — C61 MALIGNANT NEOPLASM OF PROSTATE: ICD-10-CM

## 2017-11-27 DIAGNOSIS — C79.51 SECONDARY MALIGNANT NEOPLASM OF BONE: ICD-10-CM

## 2017-11-30 ENCOUNTER — RESULT REVIEW (OUTPATIENT)
Age: 67
End: 2017-11-30

## 2017-11-30 ENCOUNTER — APPOINTMENT (OUTPATIENT)
Dept: HEMATOLOGY ONCOLOGY | Facility: CLINIC | Age: 67
End: 2017-11-30
Payer: MEDICARE

## 2017-11-30 VITALS
OXYGEN SATURATION: 98 % | BODY MASS INDEX: 25.36 KG/M2 | TEMPERATURE: 97.4 F | RESPIRATION RATE: 16 BRPM | HEART RATE: 80 BPM | SYSTOLIC BLOOD PRESSURE: 131 MMHG | WEIGHT: 138.65 LBS | DIASTOLIC BLOOD PRESSURE: 88 MMHG

## 2017-11-30 DIAGNOSIS — C79.51 SECONDARY MALIGNANT NEOPLASM OF BONE: ICD-10-CM

## 2017-11-30 DIAGNOSIS — R63.4 ABNORMAL WEIGHT LOSS: ICD-10-CM

## 2017-11-30 LAB
HCT VFR BLD CALC: 35.9 % — LOW (ref 39–50)
HGB BLD-MCNC: 12.2 G/DL — LOW (ref 13–17)
MCHC RBC-ENTMCNC: 30.1 PG — SIGNIFICANT CHANGE UP (ref 27–34)
MCHC RBC-ENTMCNC: 34 G/DL — SIGNIFICANT CHANGE UP (ref 32–36)
MCV RBC AUTO: 88.4 FL — SIGNIFICANT CHANGE UP (ref 80–100)
PLATELET # BLD AUTO: 200 K/UL — SIGNIFICANT CHANGE UP (ref 150–400)
RBC # BLD: 4.06 M/UL — LOW (ref 4.2–5.8)
RBC # FLD: 14.8 % — HIGH (ref 10.3–14.5)
WBC # BLD: 4.9 K/UL — SIGNIFICANT CHANGE UP (ref 3.8–10.5)
WBC # FLD AUTO: 4.9 K/UL — SIGNIFICANT CHANGE UP (ref 3.8–10.5)

## 2017-11-30 PROCEDURE — 99215 OFFICE O/P EST HI 40 MIN: CPT

## 2017-11-30 RX ORDER — TBO-FILGRASTIM 480 UG/.8ML
480 INJECTION, SOLUTION SUBCUTANEOUS
Qty: 4 | Refills: 0 | Status: DISCONTINUED | COMMUNITY
Start: 2017-06-06 | End: 2017-11-30

## 2017-11-30 RX ORDER — DOCETAXEL 10 MG/ML
20 INJECTION INTRAVENOUS
Qty: 3 | Refills: 0 | Status: DISCONTINUED | COMMUNITY
Start: 2017-05-31 | End: 2017-11-30

## 2017-12-01 ENCOUNTER — APPOINTMENT (OUTPATIENT)
Dept: HEMATOLOGY ONCOLOGY | Facility: CLINIC | Age: 67
End: 2017-12-01

## 2017-12-01 LAB
LDH SERPL-CCNC: 158 U/L
PSA SERPL-MCNC: 40.64 NG/ML

## 2017-12-12 LAB
ALBUMIN SERPL ELPH-MCNC: 3.8 G/DL
ALP BLD-CCNC: 63 U/L
ALT SERPL-CCNC: 28 U/L
ANION GAP SERPL CALC-SCNC: 16 MMOL/L
AST SERPL-CCNC: 41 U/L
BILIRUB SERPL-MCNC: 0.3 MG/DL
BUN SERPL-MCNC: 18 MG/DL
CALCIUM SERPL-MCNC: 9.6 MG/DL
CHLORIDE SERPL-SCNC: 102 MMOL/L
CO2 SERPL-SCNC: 24 MMOL/L
CREAT SERPL-MCNC: 0.65 MG/DL
GLUCOSE SERPL-MCNC: 92 MG/DL
POTASSIUM SERPL-SCNC: 4.6 MMOL/L
PROT SERPL-MCNC: 6.8 G/DL
SODIUM SERPL-SCNC: 142 MMOL/L

## 2017-12-19 PROCEDURE — 87070 CULTURE OTHR SPECIMN AEROBIC: CPT

## 2017-12-19 PROCEDURE — 88342 IMHCHEM/IMCYTCHM 1ST ANTB: CPT

## 2017-12-19 PROCEDURE — 87116 MYCOBACTERIA CULTURE: CPT

## 2017-12-19 PROCEDURE — 84157 ASSAY OF PROTEIN OTHER: CPT

## 2017-12-19 PROCEDURE — 83986 ASSAY PH BODY FLUID NOS: CPT

## 2017-12-19 PROCEDURE — 83615 LACTATE (LD) (LDH) ENZYME: CPT

## 2017-12-19 PROCEDURE — 88305 TISSUE EXAM BY PATHOLOGIST: CPT

## 2017-12-19 PROCEDURE — 82553 CREATINE MB FRACTION: CPT

## 2017-12-19 PROCEDURE — 82435 ASSAY OF BLOOD CHLORIDE: CPT

## 2017-12-19 PROCEDURE — 87205 SMEAR GRAM STAIN: CPT

## 2017-12-19 PROCEDURE — 93308 TTE F-UP OR LMTD: CPT

## 2017-12-19 PROCEDURE — 80048 BASIC METABOLIC PNL TOTAL CA: CPT

## 2017-12-19 PROCEDURE — 87015 SPECIMEN INFECT AGNT CONCNTJ: CPT

## 2017-12-19 PROCEDURE — 85014 HEMATOCRIT: CPT

## 2017-12-19 PROCEDURE — 83605 ASSAY OF LACTIC ACID: CPT

## 2017-12-19 PROCEDURE — 97162 PT EVAL MOD COMPLEX 30 MIN: CPT

## 2017-12-19 PROCEDURE — 84132 ASSAY OF SERUM POTASSIUM: CPT

## 2017-12-19 PROCEDURE — 82550 ASSAY OF CK (CPK): CPT

## 2017-12-19 PROCEDURE — 83735 ASSAY OF MAGNESIUM: CPT

## 2017-12-19 PROCEDURE — 82042 OTHER SOURCE ALBUMIN QUAN EA: CPT

## 2017-12-19 PROCEDURE — 87206 SMEAR FLUORESCENT/ACID STAI: CPT

## 2017-12-19 PROCEDURE — 85379 FIBRIN DEGRADATION QUANT: CPT

## 2017-12-19 PROCEDURE — 84311 SPECTROPHOTOMETRY: CPT

## 2017-12-19 PROCEDURE — 71046 X-RAY EXAM CHEST 2 VIEWS: CPT

## 2017-12-19 PROCEDURE — 83880 ASSAY OF NATRIURETIC PEPTIDE: CPT

## 2017-12-19 PROCEDURE — 71045 X-RAY EXAM CHEST 1 VIEW: CPT

## 2017-12-19 PROCEDURE — 84484 ASSAY OF TROPONIN QUANT: CPT

## 2017-12-19 PROCEDURE — 85730 THROMBOPLASTIN TIME PARTIAL: CPT

## 2017-12-19 PROCEDURE — 87102 FUNGUS ISOLATION CULTURE: CPT

## 2017-12-19 PROCEDURE — 93005 ELECTROCARDIOGRAM TRACING: CPT

## 2017-12-19 PROCEDURE — 94010 BREATHING CAPACITY TEST: CPT

## 2017-12-19 PROCEDURE — 71250 CT THORAX DX C-: CPT

## 2017-12-19 PROCEDURE — 85610 PROTHROMBIN TIME: CPT

## 2017-12-19 PROCEDURE — 89051 BODY FLUID CELL COUNT: CPT

## 2017-12-19 PROCEDURE — 93970 EXTREMITY STUDY: CPT

## 2017-12-19 PROCEDURE — 84478 ASSAY OF TRIGLYCERIDES: CPT

## 2017-12-19 PROCEDURE — 82945 GLUCOSE OTHER FLUID: CPT

## 2017-12-19 PROCEDURE — 88341 IMHCHEM/IMCYTCHM EA ADD ANTB: CPT

## 2017-12-19 PROCEDURE — 82947 ASSAY GLUCOSE BLOOD QUANT: CPT

## 2017-12-19 PROCEDURE — 99285 EMERGENCY DEPT VISIT HI MDM: CPT | Mod: 25

## 2017-12-19 PROCEDURE — 86901 BLOOD TYPING SEROLOGIC RH(D): CPT

## 2017-12-19 PROCEDURE — 80053 COMPREHEN METABOLIC PANEL: CPT

## 2017-12-19 PROCEDURE — 87075 CULTR BACTERIA EXCEPT BLOOD: CPT

## 2017-12-19 PROCEDURE — 84100 ASSAY OF PHOSPHORUS: CPT

## 2017-12-19 PROCEDURE — 86850 RBC ANTIBODY SCREEN: CPT

## 2017-12-19 PROCEDURE — 86900 BLOOD TYPING SEROLOGIC ABO: CPT

## 2017-12-19 PROCEDURE — 85027 COMPLETE CBC AUTOMATED: CPT

## 2017-12-19 PROCEDURE — 71275 CT ANGIOGRAPHY CHEST: CPT

## 2017-12-19 PROCEDURE — C1729: CPT

## 2017-12-19 PROCEDURE — 82330 ASSAY OF CALCIUM: CPT

## 2017-12-19 PROCEDURE — 82803 BLOOD GASES ANY COMBINATION: CPT

## 2017-12-19 PROCEDURE — 84295 ASSAY OF SERUM SODIUM: CPT

## 2017-12-19 PROCEDURE — 88112 CYTOPATH CELL ENHANCE TECH: CPT

## 2017-12-28 ENCOUNTER — OUTPATIENT (OUTPATIENT)
Dept: OUTPATIENT SERVICES | Facility: HOSPITAL | Age: 67
LOS: 1 days | Discharge: HOME | End: 2017-12-28

## 2017-12-28 DIAGNOSIS — C79.51 SECONDARY MALIGNANT NEOPLASM OF BONE: ICD-10-CM

## 2017-12-28 DIAGNOSIS — C61 MALIGNANT NEOPLASM OF PROSTATE: ICD-10-CM

## 2018-01-08 ENCOUNTER — RESULT REVIEW (OUTPATIENT)
Age: 68
End: 2018-01-08

## 2018-01-08 ENCOUNTER — APPOINTMENT (OUTPATIENT)
Dept: HEMATOLOGY ONCOLOGY | Facility: CLINIC | Age: 68
End: 2018-01-08
Payer: MEDICARE

## 2018-01-08 VITALS
WEIGHT: 141.07 LBS | OXYGEN SATURATION: 97 % | BODY MASS INDEX: 25.81 KG/M2 | HEART RATE: 78 BPM | DIASTOLIC BLOOD PRESSURE: 78 MMHG | SYSTOLIC BLOOD PRESSURE: 120 MMHG | RESPIRATION RATE: 16 BRPM | TEMPERATURE: 97.4 F

## 2018-01-08 LAB
HCT VFR BLD CALC: 34.5 % — LOW (ref 39–50)
HGB BLD-MCNC: 11.9 G/DL — LOW (ref 13–17)
MCHC RBC-ENTMCNC: 30.4 PG — SIGNIFICANT CHANGE UP (ref 27–34)
MCHC RBC-ENTMCNC: 34.4 G/DL — SIGNIFICANT CHANGE UP (ref 32–36)
MCV RBC AUTO: 88.4 FL — SIGNIFICANT CHANGE UP (ref 80–100)
PLATELET # BLD AUTO: 154 K/UL — SIGNIFICANT CHANGE UP (ref 150–400)
RBC # BLD: 3.9 M/UL — LOW (ref 4.2–5.8)
RBC # FLD: 13.4 % — SIGNIFICANT CHANGE UP (ref 10.3–14.5)
WBC # BLD: 4.8 K/UL — SIGNIFICANT CHANGE UP (ref 3.8–10.5)
WBC # FLD AUTO: 4.8 K/UL — SIGNIFICANT CHANGE UP (ref 3.8–10.5)

## 2018-01-08 PROCEDURE — 99214 OFFICE O/P EST MOD 30 MIN: CPT

## 2018-01-08 RX ORDER — DRONABINOL 5 MG/1
5 CAPSULE ORAL
Qty: 60 | Refills: 0 | Status: DISCONTINUED | COMMUNITY
Start: 2017-11-30 | End: 2018-01-08

## 2018-01-09 LAB
ALBUMIN SERPL ELPH-MCNC: 3.3 G/DL
ALP BLD-CCNC: 63 U/L
ALT SERPL-CCNC: 29 U/L
ANION GAP SERPL CALC-SCNC: 10 MMOL/L
AST SERPL-CCNC: 38 U/L
BILIRUB SERPL-MCNC: 0.2 MG/DL
BUN SERPL-MCNC: 14 MG/DL
CALCIUM SERPL-MCNC: 8.5 MG/DL
CHLORIDE SERPL-SCNC: 103 MMOL/L
CO2 SERPL-SCNC: 27 MMOL/L
CREAT SERPL-MCNC: 0.63 MG/DL
GLUCOSE SERPL-MCNC: 87 MG/DL
LDH SERPL-CCNC: 147 U/L
POTASSIUM SERPL-SCNC: 4.3 MMOL/L
PROT SERPL-MCNC: 6 G/DL
PSA SERPL-MCNC: 98.36 NG/ML
SODIUM SERPL-SCNC: 140 MMOL/L

## 2018-02-09 ENCOUNTER — OUTPATIENT (OUTPATIENT)
Dept: OUTPATIENT SERVICES | Facility: HOSPITAL | Age: 68
LOS: 1 days | Discharge: HOME | End: 2018-02-09

## 2018-02-09 DIAGNOSIS — C79.81 SECONDARY MALIGNANT NEOPLASM OF BREAST: ICD-10-CM

## 2018-02-09 DIAGNOSIS — C61 MALIGNANT NEOPLASM OF PROSTATE: ICD-10-CM

## 2018-02-13 ENCOUNTER — APPOINTMENT (OUTPATIENT)
Dept: HEMATOLOGY ONCOLOGY | Facility: CLINIC | Age: 68
End: 2018-02-13

## 2018-02-13 DIAGNOSIS — C61 MALIGNANT NEOPLASM OF PROSTATE: ICD-10-CM

## 2018-03-06 ENCOUNTER — APPOINTMENT (OUTPATIENT)
Age: 68
End: 2018-03-06
Payer: MEDICARE

## 2018-03-06 VITALS
HEART RATE: 100 BPM | BODY MASS INDEX: 25.95 KG/M2 | SYSTOLIC BLOOD PRESSURE: 114 MMHG | DIASTOLIC BLOOD PRESSURE: 77 MMHG | TEMPERATURE: 98.3 F | HEIGHT: 62 IN | RESPIRATION RATE: 17 BRPM | WEIGHT: 141 LBS

## 2018-03-06 DIAGNOSIS — B18.1 CHRONIC VIRAL HEPATITIS B W/OUT DELTA-AGENT: ICD-10-CM

## 2018-03-06 PROCEDURE — 91200 LIVER ELASTOGRAPHY: CPT

## 2018-03-06 PROCEDURE — ZZZZZ: CPT

## 2018-03-06 PROCEDURE — 99214 OFFICE O/P EST MOD 30 MIN: CPT | Mod: 25

## 2018-03-07 RX ORDER — CHLORHEXIDINE GLUCONATE, 0.12% ORAL RINSE 1.2 MG/ML
0.12 SOLUTION DENTAL
Qty: 473 | Refills: 0 | Status: ACTIVE | COMMUNITY
Start: 2018-02-21

## 2018-03-07 RX ORDER — TENOFOVIR DISOPROXIL FUMARATE 300 MG/1
300 TABLET, COATED ORAL DAILY
Qty: 30 | Refills: 5 | Status: DISCONTINUED | COMMUNITY
Start: 2017-11-10 | End: 2018-03-07

## 2018-03-07 RX ORDER — MOMETASONE FUROATE 1 MG/G
0.1 CREAM TOPICAL
Qty: 45 | Refills: 0 | Status: ACTIVE | COMMUNITY
Start: 2017-11-22

## 2018-06-12 ENCOUNTER — APPOINTMENT (OUTPATIENT)
Dept: HEPATOLOGY | Facility: CLINIC | Age: 68
End: 2018-06-12

## 2018-06-30 NOTE — ED PROVIDER NOTE - NS ED ATTENDING STATEMENT MOD
CONSTITUTIONAL: Well-developed; well-nourished; in no acute distress.   SKIN: warm, dry; abrasion over R elbow.  HEAD: Normocephalic; atraumatic.  EYES: PERRL, EOMI, normal sclera and conjunctiva   ENT: No nasal discharge; airway clear.  NECK: Supple; non tender.  CARD: S1, S2 normal; no murmurs, gallops, or rubs. Regular rate and rhythm.   RESP: Wheezing over R upper lungs. No increased respiratory   ABD: soft ntnd  EXT: Normal ROM. Moving all extremities well. RLE swelling > L calf. No posterior calf ttp.   LYMPH: No acute cervical adenopathy.  NEURO: Alert, oriented, grossly unremarkable. No cranial nerve deficits, moving all extremities well.   PSYCH: Cooperative, appropriate. I have personally seen and examined this patient. I have fully participated in the care of this patient. I have reviewed all pertinent clinical information, including history physical exam, plan and the Resident's note and agree except as noted

## 2018-07-02 ENCOUNTER — RX RENEWAL (OUTPATIENT)
Age: 68
End: 2018-07-02

## 2018-07-02 RX ORDER — ENTECAVIR 0.5 MG/1
0.5 TABLET, FILM COATED ORAL
Qty: 30 | Refills: 2 | Status: ACTIVE | COMMUNITY
Start: 2018-03-07 | End: 1900-01-01

## 2018-07-09 ENCOUNTER — APPOINTMENT (OUTPATIENT)
Dept: HEPATOLOGY | Facility: CLINIC | Age: 68
End: 2018-07-09

## 2018-07-16 PROBLEM — C61 MALIGNANT NEOPLASM OF PROSTATE: Chronic | Status: ACTIVE | Noted: 2017-01-31

## 2019-03-18 NOTE — H&P ADULT - NSHPPOAPULMEMBOLUS_GEN_A_CORE
Assessment  DMT2: 63y Male with DM T2 with hyperglycemia on basal bolus insulin blood sugars trending down,, had new hypoglycemic episode in am, eating meals, insulin dose adjusted today,  non compliant with low carb diet.  Fever: On medications, stable, monitored.  HTN: Controlled, no headaches, on medications.  ESRD: On hemodialysis, Monitor labs/BMP       Problem/Plan - 1:  ·  Problem: Type 2 diabetes mellitus.  Plan:Will DCLantus at bed time for now.  Will continue Humalog to 5 units before each meal in addition to Humalog correction scale coverage.  Patient counseled for compliance with consistent low carb diet.     Problem/Plan - 2:  ·  Problem: Fever of unknown origin (FUO).  Plan: Continue treatment, primary team FU.      Problem/Plan - 3:  ·  Problem: Hypertension.  Plan: Continue medications, monitoring, primary team FU.      Problem/Plan - 4:  ·  Problem: ESRD (end stage renal disease).  Plan: On HD, continue as scheduled, renal team FU. no

## 2019-10-14 NOTE — DISCHARGE NOTE ADULT - MEDICATION SUMMARY - MEDICATIONS TO STOP TAKING
I will STOP taking the medications listed below when I get home from the hospital:    Sprycel 50 mg oral tablet  -- 1 tab(s) by mouth once a day    vitamin E oral capsule  -- 1 cap(s) by mouth once a day    Zoladex  --  subcutaneous once every 3 months
Refer to the paper Trauma Flowsheet documentation

## 2019-10-24 NOTE — ED ADULT NURSE NOTE - CAS EDN INTEG ASSESS
----- Message from Shine Mcqueen MD sent at 10/24/2019 11:14 AM CDT -----  Renal panel.    ----- Message -----  From: Madison Rey MA  Sent: 10/24/2019  10:44 AM CDT  To: Shine Mcqueen MD    Good morning,   Pt is solo arita. NP did you want labs also ?   Thanks        WDL

## 2021-11-26 NOTE — PROGRESS NOTE ADULT - SUBJECTIVE AND OBJECTIVE BOX
Interval Events:  Left chest tube in place  Placed to water seal yesterday evening  Repeat CXR late last night shows no reaccumulation of fluid or air  Drained approx 280 cc in past 24 hours  Plan for right VATS today  Only complaint is some irritation at site of chest tube insertion    REVIEW OF SYSTEMS:  Constitutional: [ ] negative [ ] fevers [ ] chills [ ] weight loss [ ] weight gain  HEENT: [ ] negative [ ] dry eyes [ ] eye irritation [ ] postnasal drip [ ] nasal congestion  CV: [ ] negative  [ ] chest pain [ ] orthopnea [ ] palpitations [ ] murmur  Resp: [ ] negative [ ] cough [ ] shortness of breath [ ] dyspnea [ ] wheezing [ ] sputum [ ] hemoptysis  GI: [ ] negative [ ] nausea [ ] vomiting [ ] diarrhea [ ] constipation [ ] abd pain [ ] dysphagia   : [ ] negative [ ] dysuria [ ] nocturia [ ] hematuria [ ] increased urinary frequency  Musculoskeletal: [ ] negative [ ] back pain [ ] myalgias [ ] arthralgias [ ] fracture  Skin: [ ] negative [ ] rash [ ] itch  Neurological: [ ] negative [ ] headache [ ] dizziness [ ] syncope [ ] weakness [ ] numbness  Psychiatric: [ ] negative [ ] anxiety [ ] depression  Endocrine: [ ] negative [ ] diabetes [ ] thyroid problem  Hematologic/Lymphatic: [ ] negative [ ] anemia [ ] bleeding problem  Allergic/Immunologic: [ ] negative [ ] itchy eyes [ ] nasal discharge [ ] hives [ ] angioedema  [x] All other systems negative  [ ] Unable to assess ROS because ________    OBJECTIVE:  ICU Vital Signs Last 24 Hrs  T(C): 36.7 (17 Oct 2017 05:04), Max: 36.9 (16 Oct 2017 21:39)  T(F): 98 (17 Oct 2017 05:04), Max: 98.4 (16 Oct 2017 21:39)  HR: 79 (17 Oct 2017 05:04) (79 - 107)  BP: 111/78 (17 Oct 2017 05:04) (107/76 - 136/83)  BP(mean): --  ABP: --  ABP(mean): --  RR: 18 (17 Oct 2017 05:04) (18 - 19)  SpO2: 92% (16 Oct 2017 21:39) (91% - 92%)        10-16 @ 07:01  -  10-17 @ 07:00  --------------------------------------------------------  IN: 700 mL / OUT: 310 mL / NET: 390 mL    10-17 @ 07:01  -  10-17 @ 08:01  --------------------------------------------------------  IN: 0 mL / OUT: 70 mL / NET: -70 mL      CAPILLARY BLOOD GLUCOSE          PHYSICAL EXAM:  General: Awake, alert, oriented X 3.   Neck: No JVD.   Respiratory: Saturating 87% RA. Good aeration of the left. CT in place with dressing on top, on water seal. Right side with decreased breath sounds   Cardiovascular: S1 S2 normal. No murmurs, rubs or gallops.   Abdomen: Soft, non-tender, non-distended. No organomegaly.  Extremities: Warm to touch. Peripheral pulse palpable. No pedal edema.   Skin: No rashes or skin lesions  Neurological: Motor and sensory examination equal and normal in all four extremities.    HOSPITAL MEDICATIONS:  heparin  Injectable 5000 Unit(s) SubCutaneous every 8 hours    cefuroxime  IVPB 1500 milliGRAM(s) IV Intermittent once          dronabinol 2.5 milliGRAM(s) Oral two times a day      bicalutamide 150 milliGRAM(s) Oral daily      calcium carbonate 1250 mG + Vitamin D (OsCal 500 + D) 1 Tablet(s) Oral daily  cholecalciferol 1000 Unit(s) Oral daily    influenza   Vaccine 0.5 milliLiter(s) IntraMuscular once    chlorhexidine 0.12% Liquid 30 milliLiter(s) Swish and Spit once  sodium chloride 0.65% Nasal 1 Spray(s) Both Nostrils two times a day PRN        LABS:                        10.6   3.6   )-----------( 295      ( 17 Oct 2017 06:57 )             30.6     Hgb Trend: 10.6<--, 10.1<--, 11.0<--, 12.1<--, 11.8<--  10-17    139  |  102  |  19  ----------------------------<  83  3.7   |  26  |  0.49<L>    Ca    8.2<L>      17 Oct 2017 06:57      Creatinine Trend: 0.49<--, 0.63<--, 0.54<--  PT/INR - ( 16 Oct 2017 06:13 )   PT: 12.0 sec;   INR: 1.11 ratio           MICROBIOLOGY:     RADIOLOGY:  [ ] Reviewed and interpreted by me    PULMONARY FUNCTION TESTS:    EKG:    ASSESSMENT AND RECOMMENDATION    67 M with history of metastatic prostate cancer with bilateral pleural effusions presented with worsening shortness of breath with left hydropneumothorax. He is s/p left chest tube. Dyspnea improved. Patient is planned for VATS, pleural biopsy and Pleurx placement today of right side. Pleural fluid studies suggest exudative process. Cytology from last thoracentesis in August was negative for malignancy; however, given history and recurrent effusions, still likely related to malignancy.    -Thoracic plan for right VATS, pleural biopsy and Pleurx placement tomorrow  -Please use supplemental O2 to maintain SpO2 > 92%  -Maintain left chest tube to water seal  -Pain control  -Will follow up pleural studies  -Please check serum LDH (to compare to fluid LDH) Interval Events:  Left chest tube in place  Placed to water seal yesterday evening  Repeat CXR late last night shows no reaccumulation of fluid or air  Drained approx 280 cc in past 24 hours  Plan for right VATS today  Only complaint is some irritation at site of chest tube insertion    REVIEW OF SYSTEMS:  Constitutional: [ ] negative [ ] fevers [ ] chills [ ] weight loss [ ] weight gain  HEENT: [ ] negative [ ] dry eyes [ ] eye irritation [ ] postnasal drip [ ] nasal congestion  CV: [ ] negative  [ ] chest pain [ ] orthopnea [ ] palpitations [ ] murmur  Resp: [ ] negative [ ] cough [ ] shortness of breath [ ] dyspnea [ ] wheezing [ ] sputum [ ] hemoptysis  GI: [ ] negative [ ] nausea [ ] vomiting [ ] diarrhea [ ] constipation [ ] abd pain [ ] dysphagia   : [ ] negative [ ] dysuria [ ] nocturia [ ] hematuria [ ] increased urinary frequency  Musculoskeletal: [ ] negative [ ] back pain [ ] myalgias [ ] arthralgias [ ] fracture  Skin: [ ] negative [ ] rash [ ] itch  Neurological: [ ] negative [ ] headache [ ] dizziness [ ] syncope [ ] weakness [ ] numbness  Psychiatric: [ ] negative [ ] anxiety [ ] depression  Endocrine: [ ] negative [ ] diabetes [ ] thyroid problem  Hematologic/Lymphatic: [ ] negative [ ] anemia [ ] bleeding problem  Allergic/Immunologic: [ ] negative [ ] itchy eyes [ ] nasal discharge [ ] hives [ ] angioedema  [x] All other systems negative  [ ] Unable to assess ROS because ________    OBJECTIVE:  ICU Vital Signs Last 24 Hrs  T(C): 36.7 (17 Oct 2017 05:04), Max: 36.9 (16 Oct 2017 21:39)  T(F): 98 (17 Oct 2017 05:04), Max: 98.4 (16 Oct 2017 21:39)  HR: 79 (17 Oct 2017 05:04) (79 - 107)  BP: 111/78 (17 Oct 2017 05:04) (107/76 - 136/83)  BP(mean): --  ABP: --  ABP(mean): --  RR: 18 (17 Oct 2017 05:04) (18 - 19)  SpO2: 92% (16 Oct 2017 21:39) (91% - 92%)        10-16 @ 07:01  -  10-17 @ 07:00  --------------------------------------------------------  IN: 700 mL / OUT: 310 mL / NET: 390 mL    10-17 @ 07:01  -  10-17 @ 08:01  --------------------------------------------------------  IN: 0 mL / OUT: 70 mL / NET: -70 mL      CAPILLARY BLOOD GLUCOSE          PHYSICAL EXAM:  General: Awake, alert, oriented X 3.   Neck: No JVD.   Respiratory: Saturating 87% RA. Good aeration of the left. CT in place with dressing on top, on water seal. Right side with decreased breath sounds   Cardiovascular: S1 S2 normal. No murmurs, rubs or gallops.   Abdomen: Soft, non-tender, non-distended. No organomegaly.  Extremities: Warm to touch. Peripheral pulse palpable. No pedal edema.   Skin: No rashes or skin lesions  Neurological: Motor and sensory examination equal and normal in all four extremities.    HOSPITAL MEDICATIONS:  heparin  Injectable 5000 Unit(s) SubCutaneous every 8 hours    cefuroxime  IVPB 1500 milliGRAM(s) IV Intermittent once          dronabinol 2.5 milliGRAM(s) Oral two times a day      bicalutamide 150 milliGRAM(s) Oral daily      calcium carbonate 1250 mG + Vitamin D (OsCal 500 + D) 1 Tablet(s) Oral daily  cholecalciferol 1000 Unit(s) Oral daily    influenza   Vaccine 0.5 milliLiter(s) IntraMuscular once    chlorhexidine 0.12% Liquid 30 milliLiter(s) Swish and Spit once  sodium chloride 0.65% Nasal 1 Spray(s) Both Nostrils two times a day PRN        LABS:                        10.6   3.6   )-----------( 295      ( 17 Oct 2017 06:57 )             30.6     Hgb Trend: 10.6<--, 10.1<--, 11.0<--, 12.1<--, 11.8<--  10-17    139  |  102  |  19  ----------------------------<  83  3.7   |  26  |  0.49<L>    Ca    8.2<L>      17 Oct 2017 06:57      Creatinine Trend: 0.49<--, 0.63<--, 0.54<--  PT/INR - ( 16 Oct 2017 06:13 )   PT: 12.0 sec;   INR: 1.11 ratio           MICROBIOLOGY:     RADIOLOGY:  [ ] Reviewed and interpreted by me    PULMONARY FUNCTION TESTS:    EKG:    ASSESSMENT AND RECOMMENDATION    67 M with history of metastatic prostate cancer with bilateral pleural effusions presented with worsening shortness of breath with left hydropneumothorax. He is s/p left chest tube. Dyspnea improved. Patient is planned for VATS, pleural biopsy and Pleurx placement today of right side. Pleural fluid studies suggest exudative process. Cytology from last thoracentesis in August was negative for malignancy; however, given history and recurrent effusions, still likely related to malignancy.    -Thoracic plan for right VATS, pleural biopsy and Pleurx placement today  -Please use supplemental O2 to maintain SpO2 > 92%  -Maintain left chest tube to water seal, monitor pleural fluid drainage  -can clamp the tube once drainage below 100cc/24 hrs and no air leak and repeat imaging in 4-6 hrs to r/o PTX  -Pain control  -Will follow up pleural studies NEGATIVE

## 2022-03-17 NOTE — PATIENT PROFILE ADULT. - AS [facility] BRADEN MOISTURE
Refill Authorization Note   Melvina Spencer  is requesting a refill authorization.  Brief Assessment and Rationale for Refill:  Approve     Medication Therapy Plan:       Medication Reconciliation Completed: No   Comments:   --->Care Gap information included below if applicable.   Orders Placed This Encounter    metFORMIN (GLUCOPHAGE) 1000 MG tablet      Requested Prescriptions   Signed Prescriptions Disp Refills    metFORMIN (GLUCOPHAGE) 1000 MG tablet 180 tablet 1     Sig: TAKE 1 TABLET BY MOUTH TWICE A DAY WITH MEALS       Endocrinology:  Diabetes - Biguanides Passed - 3/17/2022 12:14 PM        Passed - Patient is at least 18 years old        Passed - Valid encounter within last 15 months     Recent Visits  Date Type Provider Dept   02/10/22 Office Visit Ken Webster MD [facility]   08/12/21 Office Visit Ken Webster MD [facility]   02/11/21 Office Visit Ken Webster MD [facility]   08/12/20 Office Visit Ken Webster MD [facility]   05/05/20 Office Visit Ken Webster MD [facility]   Showing recent visits within past 720 days and meeting all other requirements  Future Appointments  No visits were found meeting these conditions.  Showing future appointments within next 150 days and meeting all other requirements      Future Appointments              In 4 months [facility] [place] [facility] [place]    In 4 months [facility] [place] [facility] [place]    In 4 months Ken Webster MD [facility]                Passed - Cr is 1.39 or below and within 360 days     Lab Results   Component Value Date    CREATININE 0.7 02/05/2022    CREATININE 0.7 08/09/2021    CREATININE 0.7 07/26/2021              Passed - HBA1C within 180 days     Lab Results   Component Value Date    HGBA1C 5.9 (H) 02/05/2022    HGBA1C 5.8 (H) 08/09/2021    HGBA1C 6.1 (H) 02/08/2021              Passed  - eGFR is 45 or above and within 360 days     Lab Results   Component Value Date    EGFRNONAA >60.0 02/05/2022    EGFRNONAA >60.0 08/09/2021    EGFRNONAA >60.0 07/26/2021                    Appointments  past 12m or future 3m with PCP    Date Provider   Last Visit   2/10/2022 Ken Webster MD   Next Visit   8/11/2022 Ken Webster MD   ED visits in past 90 days: 0     Note composed:12:15 PM 03/17/2022          (4) rarely moist

## 2022-09-14 NOTE — ED ADULT NURSE NOTE - CADM POA CENTRAL LINE
Last visit--03/26/2018  Last refill--10/15/2018   No SSKI Counseling:  I discussed with the patient the risks of SSKI including but not limited to thyroid abnormalities, metallic taste, GI upset, fever, headache, acne, arthralgias, paraesthesias, lymphadenopathy, easy bleeding, arrhythmias, and allergic reaction.

## 2023-10-01 PROBLEM — Z92.3 HISTORY OF RADIATION THERAPY: Status: RESOLVED | Noted: 2017-11-01 | Resolved: 2023-10-01
